# Patient Record
Sex: FEMALE | Race: WHITE | NOT HISPANIC OR LATINO | Employment: OTHER | URBAN - METROPOLITAN AREA
[De-identification: names, ages, dates, MRNs, and addresses within clinical notes are randomized per-mention and may not be internally consistent; named-entity substitution may affect disease eponyms.]

---

## 2023-08-28 ENCOUNTER — APPOINTMENT (OUTPATIENT)
Dept: RADIOLOGY | Facility: MEDICAL CENTER | Age: 78
DRG: 481 | End: 2023-08-28
Attending: EMERGENCY MEDICINE
Payer: MEDICARE

## 2023-08-28 ENCOUNTER — HOSPITAL ENCOUNTER (INPATIENT)
Facility: MEDICAL CENTER | Age: 78
LOS: 1 days | DRG: 481 | End: 2023-08-30
Attending: EMERGENCY MEDICINE | Admitting: STUDENT IN AN ORGANIZED HEALTH CARE EDUCATION/TRAINING PROGRAM
Payer: MEDICARE

## 2023-08-28 DIAGNOSIS — S72.91XA CLOSED FRACTURE OF RIGHT FEMUR, UNSPECIFIED FRACTURE MORPHOLOGY, UNSPECIFIED PORTION OF FEMUR, INITIAL ENCOUNTER (HCC): ICD-10-CM

## 2023-08-28 DIAGNOSIS — S72.001A CLOSED RIGHT HIP FRACTURE, INITIAL ENCOUNTER (HCC): ICD-10-CM

## 2023-08-28 DIAGNOSIS — G47.9 TROUBLE IN SLEEPING: ICD-10-CM
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
## 2023-08-28 PROCEDURE — 70450 CT HEAD/BRAIN W/O DYE: CPT

## 2023-08-28 PROCEDURE — 99285 EMERGENCY DEPT VISIT HI MDM: CPT

## 2023-08-28 PROCEDURE — 72125 CT NECK SPINE W/O DYE: CPT

## 2023-08-28 PROCEDURE — 93005 ELECTROCARDIOGRAM TRACING: CPT | Performed by: EMERGENCY MEDICINE

## 2023-08-29 ENCOUNTER — APPOINTMENT (OUTPATIENT)
Dept: RADIOLOGY | Facility: MEDICAL CENTER | Age: 78
DRG: 481 | End: 2023-08-29
Attending: EMERGENCY MEDICINE
Payer: MEDICARE

## 2023-08-29 ENCOUNTER — APPOINTMENT (OUTPATIENT)
Dept: RADIOLOGY | Facility: MEDICAL CENTER | Age: 78
DRG: 481 | End: 2023-08-29
Attending: STUDENT IN AN ORGANIZED HEALTH CARE EDUCATION/TRAINING PROGRAM
Payer: MEDICARE

## 2023-08-29 ENCOUNTER — ANESTHESIA EVENT (OUTPATIENT)
Dept: SURGERY | Facility: MEDICAL CENTER | Age: 78
DRG: 481 | End: 2023-08-29
Payer: MEDICARE

## 2023-08-29 ENCOUNTER — ANESTHESIA (OUTPATIENT)
Dept: SURGERY | Facility: MEDICAL CENTER | Age: 78
DRG: 481 | End: 2023-08-29
Payer: MEDICARE

## 2023-08-29 PROBLEM — F32.A DEPRESSION: Status: ACTIVE | Noted: 2023-08-29

## 2023-08-29 PROBLEM — S72.001A CLOSED RIGHT HIP FRACTURE, INITIAL ENCOUNTER (HCC): Status: ACTIVE | Noted: 2023-08-29

## 2023-08-29 PROBLEM — D72.829 LEUKOCYTOSIS: Status: ACTIVE | Noted: 2023-08-29

## 2023-08-29 PROBLEM — E87.6 HYPOKALEMIA: Status: ACTIVE | Noted: 2023-08-29

## 2023-08-29 LAB
ALBUMIN SERPL BCP-MCNC: 3.4 G/DL (ref 3.2–4.9)
ALBUMIN/GLOB SERPL: 1.4 G/DL
ALP SERPL-CCNC: 98 U/L (ref 30–99)
ALT SERPL-CCNC: 12 U/L (ref 2–50)
ANION GAP SERPL CALC-SCNC: 12 MMOL/L (ref 7–16)
AST SERPL-CCNC: 16 U/L (ref 12–45)
BASOPHILS # BLD AUTO: 0.4 % (ref 0–1.8)
BASOPHILS # BLD: 0.05 K/UL (ref 0–0.12)
BILIRUB SERPL-MCNC: 0.3 MG/DL (ref 0.1–1.5)
BUN SERPL-MCNC: 17 MG/DL (ref 8–22)
CALCIUM ALBUM COR SERPL-MCNC: 8.2 MG/DL (ref 8.5–10.5)
CALCIUM SERPL-MCNC: 7.7 MG/DL (ref 8.5–10.5)
CHLORIDE SERPL-SCNC: 108 MMOL/L (ref 96–112)
CO2 SERPL-SCNC: 20 MMOL/L (ref 20–33)
CREAT SERPL-MCNC: 0.61 MG/DL (ref 0.5–1.4)
EKG IMPRESSION: NORMAL
EOSINOPHIL # BLD AUTO: 0.02 K/UL (ref 0–0.51)
EOSINOPHIL NFR BLD: 0.2 % (ref 0–6.9)
ERYTHROCYTE [DISTWIDTH] IN BLOOD BY AUTOMATED COUNT: 42.7 FL (ref 35.9–50)
GFR SERPLBLD CREATININE-BSD FMLA CKD-EPI: 90 ML/MIN/1.73 M 2
GLOBULIN SER CALC-MCNC: 2.5 G/DL (ref 1.9–3.5)
GLUCOSE SERPL-MCNC: 172 MG/DL (ref 65–99)
HCT VFR BLD AUTO: 36.7 % (ref 37–47)
HGB BLD-MCNC: 12.1 G/DL (ref 12–16)
IMM GRANULOCYTES # BLD AUTO: 0.11 K/UL (ref 0–0.11)
IMM GRANULOCYTES NFR BLD AUTO: 1 % (ref 0–0.9)
LYMPHOCYTES # BLD AUTO: 0.81 K/UL (ref 1–4.8)
LYMPHOCYTES NFR BLD: 7.1 % (ref 22–41)
MAGNESIUM SERPL-MCNC: 1.7 MG/DL (ref 1.5–2.5)
MCH RBC QN AUTO: 29.5 PG (ref 27–33)
MCHC RBC AUTO-ENTMCNC: 33 G/DL (ref 32.2–35.5)
MCV RBC AUTO: 89.5 FL (ref 81.4–97.8)
MONOCYTES # BLD AUTO: 0.53 K/UL (ref 0–0.85)
MONOCYTES NFR BLD AUTO: 4.6 % (ref 0–13.4)
NEUTROPHILS # BLD AUTO: 9.9 K/UL (ref 1.82–7.42)
NEUTROPHILS NFR BLD: 86.7 % (ref 44–72)
NRBC # BLD AUTO: 0 K/UL
NRBC BLD-RTO: 0 /100 WBC (ref 0–0.2)
PLATELET # BLD AUTO: 239 K/UL (ref 164–446)
PMV BLD AUTO: 10 FL (ref 9–12.9)
POTASSIUM SERPL-SCNC: 3.2 MMOL/L (ref 3.6–5.5)
PROT SERPL-MCNC: 5.9 G/DL (ref 6–8.2)
RBC # BLD AUTO: 4.1 M/UL (ref 4.2–5.4)
SODIUM SERPL-SCNC: 140 MMOL/L (ref 135–145)
WBC # BLD AUTO: 11.4 K/UL (ref 4.8–10.8)

## 2023-08-29 PROCEDURE — 700101 HCHG RX REV CODE 250: Performed by: ANESTHESIOLOGY

## 2023-08-29 PROCEDURE — A9270 NON-COVERED ITEM OR SERVICE: HCPCS | Performed by: STUDENT IN AN ORGANIZED HEALTH CARE EDUCATION/TRAINING PROGRAM

## 2023-08-29 PROCEDURE — 700105 HCHG RX REV CODE 258: Performed by: ANESTHESIOLOGY

## 2023-08-29 PROCEDURE — 27236 TREAT THIGH FRACTURE: CPT | Mod: 80ROC,RT | Performed by: STUDENT IN AN ORGANIZED HEALTH CARE EDUCATION/TRAINING PROGRAM

## 2023-08-29 PROCEDURE — C1713 ANCHOR/SCREW BN/BN,TIS/BN: HCPCS | Performed by: STUDENT IN AN ORGANIZED HEALTH CARE EDUCATION/TRAINING PROGRAM

## 2023-08-29 PROCEDURE — A9270 NON-COVERED ITEM OR SERVICE: HCPCS | Performed by: ANESTHESIOLOGY

## 2023-08-29 PROCEDURE — 160039 HCHG SURGERY MINUTES - EA ADDL 1 MIN LEVEL 3: Performed by: STUDENT IN AN ORGANIZED HEALTH CARE EDUCATION/TRAINING PROGRAM

## 2023-08-29 PROCEDURE — 770001 HCHG ROOM/CARE - MED/SURG/GYN PRIV*

## 2023-08-29 PROCEDURE — 700105 HCHG RX REV CODE 258: Performed by: STUDENT IN AN ORGANIZED HEALTH CARE EDUCATION/TRAINING PROGRAM

## 2023-08-29 PROCEDURE — 700102 HCHG RX REV CODE 250 W/ 637 OVERRIDE(OP): Performed by: NURSE PRACTITIONER

## 2023-08-29 PROCEDURE — 80053 COMPREHEN METABOLIC PANEL: CPT

## 2023-08-29 PROCEDURE — 73552 X-RAY EXAM OF FEMUR 2/>: CPT | Mod: RT

## 2023-08-29 PROCEDURE — A9270 NON-COVERED ITEM OR SERVICE: HCPCS | Performed by: NURSE PRACTITIONER

## 2023-08-29 PROCEDURE — 72170 X-RAY EXAM OF PELVIS: CPT

## 2023-08-29 PROCEDURE — 700102 HCHG RX REV CODE 250 W/ 637 OVERRIDE(OP): Performed by: ANESTHESIOLOGY

## 2023-08-29 PROCEDURE — 110371 HCHG SHELL REV 272: Performed by: STUDENT IN AN ORGANIZED HEALTH CARE EDUCATION/TRAINING PROGRAM

## 2023-08-29 PROCEDURE — 160028 HCHG SURGERY MINUTES - 1ST 30 MINS LEVEL 3: Performed by: STUDENT IN AN ORGANIZED HEALTH CARE EDUCATION/TRAINING PROGRAM

## 2023-08-29 PROCEDURE — 160036 HCHG PACU - EA ADDL 30 MINS PHASE I: Performed by: STUDENT IN AN ORGANIZED HEALTH CARE EDUCATION/TRAINING PROGRAM

## 2023-08-29 PROCEDURE — 700111 HCHG RX REV CODE 636 W/ 250 OVERRIDE (IP): Performed by: ANESTHESIOLOGY

## 2023-08-29 PROCEDURE — 36415 COLL VENOUS BLD VENIPUNCTURE: CPT

## 2023-08-29 PROCEDURE — 700102 HCHG RX REV CODE 250 W/ 637 OVERRIDE(OP): Performed by: STUDENT IN AN ORGANIZED HEALTH CARE EDUCATION/TRAINING PROGRAM

## 2023-08-29 PROCEDURE — 700111 HCHG RX REV CODE 636 W/ 250 OVERRIDE (IP): Mod: JZ | Performed by: ANESTHESIOLOGY

## 2023-08-29 PROCEDURE — 502000 HCHG MISC OR IMPLANTS RC 0278: Performed by: STUDENT IN AN ORGANIZED HEALTH CARE EDUCATION/TRAINING PROGRAM

## 2023-08-29 PROCEDURE — 160035 HCHG PACU - 1ST 60 MINS PHASE I: Performed by: STUDENT IN AN ORGANIZED HEALTH CARE EDUCATION/TRAINING PROGRAM

## 2023-08-29 PROCEDURE — 160009 HCHG ANES TIME/MIN: Performed by: STUDENT IN AN ORGANIZED HEALTH CARE EDUCATION/TRAINING PROGRAM

## 2023-08-29 PROCEDURE — 83735 ASSAY OF MAGNESIUM: CPT

## 2023-08-29 PROCEDURE — 71045 X-RAY EXAM CHEST 1 VIEW: CPT

## 2023-08-29 PROCEDURE — 700111 HCHG RX REV CODE 636 W/ 250 OVERRIDE (IP): Performed by: STUDENT IN AN ORGANIZED HEALTH CARE EDUCATION/TRAINING PROGRAM

## 2023-08-29 PROCEDURE — 700111 HCHG RX REV CODE 636 W/ 250 OVERRIDE (IP): Mod: JZ

## 2023-08-29 PROCEDURE — 160002 HCHG RECOVERY MINUTES (STAT): Performed by: STUDENT IN AN ORGANIZED HEALTH CARE EDUCATION/TRAINING PROGRAM

## 2023-08-29 PROCEDURE — 85025 COMPLETE CBC W/AUTO DIFF WBC: CPT

## 2023-08-29 PROCEDURE — 160048 HCHG OR STATISTICAL LEVEL 1-5: Performed by: STUDENT IN AN ORGANIZED HEALTH CARE EDUCATION/TRAINING PROGRAM

## 2023-08-29 PROCEDURE — 99223 1ST HOSP IP/OBS HIGH 75: CPT | Mod: AI | Performed by: STUDENT IN AN ORGANIZED HEALTH CARE EDUCATION/TRAINING PROGRAM

## 2023-08-29 PROCEDURE — 0QS804Z REPOSITION RIGHT FEMORAL SHAFT WITH INTERNAL FIXATION DEVICE, OPEN APPROACH: ICD-10-PCS | Performed by: STUDENT IN AN ORGANIZED HEALTH CARE EDUCATION/TRAINING PROGRAM

## 2023-08-29 PROCEDURE — 27236 TREAT THIGH FRACTURE: CPT | Mod: RT | Performed by: STUDENT IN AN ORGANIZED HEALTH CARE EDUCATION/TRAINING PROGRAM

## 2023-08-29 PROCEDURE — 99222 1ST HOSP IP/OBS MODERATE 55: CPT | Mod: 57 | Performed by: STUDENT IN AN ORGANIZED HEALTH CARE EDUCATION/TRAINING PROGRAM

## 2023-08-29 DEVICE — WIRE FIXATION KIRSCHNER TROCAR POINT: Type: IMPLANTABLE DEVICE | Site: FEMUR | Status: FUNCTIONAL

## 2023-08-29 DEVICE — IMPLANTABLE DEVICE: Type: IMPLANTABLE DEVICE | Site: FEMUR | Status: FUNCTIONAL

## 2023-08-29 DEVICE — HIP DALL MILES SET 2.0 V BEAD: Type: IMPLANTABLE DEVICE | Site: FEMUR | Status: FUNCTIONAL

## 2023-08-29 RX ORDER — HYDROMORPHONE HYDROCHLORIDE 2 MG/ML
INJECTION, SOLUTION INTRAMUSCULAR; INTRAVENOUS; SUBCUTANEOUS PRN
Status: DISCONTINUED | OUTPATIENT
Start: 2023-08-29 | End: 2023-08-29 | Stop reason: SURG

## 2023-08-29 RX ORDER — GINSENG 100 MG
50 CAPSULE ORAL DAILY
COMMUNITY

## 2023-08-29 RX ORDER — SODIUM CHLORIDE, SODIUM LACTATE, POTASSIUM CHLORIDE, CALCIUM CHLORIDE 600; 310; 30; 20 MG/100ML; MG/100ML; MG/100ML; MG/100ML
INJECTION, SOLUTION INTRAVENOUS
Status: DISCONTINUED | OUTPATIENT
Start: 2023-08-29 | End: 2023-08-29 | Stop reason: SURG

## 2023-08-29 RX ORDER — ONDANSETRON 2 MG/ML
4 INJECTION INTRAMUSCULAR; INTRAVENOUS
Status: COMPLETED | OUTPATIENT
Start: 2023-08-29 | End: 2023-08-29

## 2023-08-29 RX ORDER — OXYCODONE HYDROCHLORIDE 5 MG/1
5 TABLET ORAL
Status: DISCONTINUED | OUTPATIENT
Start: 2023-08-29 | End: 2023-08-30 | Stop reason: HOSPADM

## 2023-08-29 RX ORDER — HYDROMORPHONE HYDROCHLORIDE 1 MG/ML
0.4 INJECTION, SOLUTION INTRAMUSCULAR; INTRAVENOUS; SUBCUTANEOUS
Status: DISCONTINUED | OUTPATIENT
Start: 2023-08-29 | End: 2023-08-29 | Stop reason: HOSPADM

## 2023-08-29 RX ORDER — ACETAMINOPHEN 500 MG
1000 TABLET ORAL EVERY 6 HOURS
Status: DISCONTINUED | OUTPATIENT
Start: 2023-08-29 | End: 2023-08-29

## 2023-08-29 RX ORDER — POTASSIUM CHLORIDE 1500 MG/1
40 TABLET, EXTENDED RELEASE ORAL EVERY 4 HOURS
Status: COMPLETED | OUTPATIENT
Start: 2023-08-29 | End: 2023-08-29

## 2023-08-29 RX ORDER — OXYCODONE HCL 5 MG/5 ML
10 SOLUTION, ORAL ORAL
Status: COMPLETED | OUTPATIENT
Start: 2023-08-29 | End: 2023-08-29

## 2023-08-29 RX ORDER — TOBRAMYCIN 1.2 G/30ML
INJECTION, POWDER, LYOPHILIZED, FOR SOLUTION INTRAVENOUS
Status: DISCONTINUED | OUTPATIENT
Start: 2023-08-29 | End: 2023-08-29 | Stop reason: HOSPADM

## 2023-08-29 RX ORDER — DEXTROAMPHETAMINE SACCHARATE, AMPHETAMINE ASPARTATE MONOHYDRATE, DEXTROAMPHETAMINE SULFATE AND AMPHETAMINE SULFATE 7.5; 7.5; 7.5; 7.5 MG/1; MG/1; MG/1; MG/1
30 CAPSULE, EXTENDED RELEASE ORAL EVERY MORNING
Status: ON HOLD | COMMUNITY
End: 2023-09-11 | Stop reason: SDUPTHER

## 2023-08-29 RX ORDER — LABETALOL HYDROCHLORIDE 5 MG/ML
5 INJECTION, SOLUTION INTRAVENOUS
Status: DISCONTINUED | OUTPATIENT
Start: 2023-08-29 | End: 2023-08-29 | Stop reason: HOSPADM

## 2023-08-29 RX ORDER — ACETAMINOPHEN 500 MG
1000 TABLET ORAL EVERY 6 HOURS PRN
Status: DISCONTINUED | OUTPATIENT
Start: 2023-09-03 | End: 2023-08-29

## 2023-08-29 RX ORDER — MORPHINE SULFATE 4 MG/ML
2 INJECTION INTRAVENOUS
Status: DISCONTINUED | OUTPATIENT
Start: 2023-08-29 | End: 2023-08-30 | Stop reason: HOSPADM

## 2023-08-29 RX ORDER — POLYETHYLENE GLYCOL 3350 17 G/17G
1 POWDER, FOR SOLUTION ORAL
Status: DISCONTINUED | OUTPATIENT
Start: 2023-08-29 | End: 2023-08-30

## 2023-08-29 RX ORDER — DIPHENHYDRAMINE HCL 25 MG
25 TABLET ORAL
Status: ON HOLD | COMMUNITY
End: 2023-08-30

## 2023-08-29 RX ORDER — OXYCODONE HYDROCHLORIDE 5 MG/1
2.5 TABLET ORAL
Status: DISCONTINUED | OUTPATIENT
Start: 2023-08-29 | End: 2023-08-30 | Stop reason: HOSPADM

## 2023-08-29 RX ORDER — VANCOMYCIN HYDROCHLORIDE 1 G/20ML
INJECTION, POWDER, LYOPHILIZED, FOR SOLUTION INTRAVENOUS
Status: COMPLETED | OUTPATIENT
Start: 2023-08-29 | End: 2023-08-29

## 2023-08-29 RX ORDER — ONDANSETRON 2 MG/ML
INJECTION INTRAMUSCULAR; INTRAVENOUS PRN
Status: DISCONTINUED | OUTPATIENT
Start: 2023-08-29 | End: 2023-08-29 | Stop reason: SURG

## 2023-08-29 RX ORDER — HALOPERIDOL 5 MG/ML
1 INJECTION INTRAMUSCULAR
Status: DISCONTINUED | OUTPATIENT
Start: 2023-08-29 | End: 2023-08-29 | Stop reason: HOSPADM

## 2023-08-29 RX ORDER — DEXAMETHASONE SODIUM PHOSPHATE 4 MG/ML
INJECTION, SOLUTION INTRA-ARTICULAR; INTRALESIONAL; INTRAMUSCULAR; INTRAVENOUS; SOFT TISSUE PRN
Status: DISCONTINUED | OUTPATIENT
Start: 2023-08-29 | End: 2023-08-29 | Stop reason: SURG

## 2023-08-29 RX ORDER — HYDROMORPHONE HYDROCHLORIDE 1 MG/ML
0.1 INJECTION, SOLUTION INTRAMUSCULAR; INTRAVENOUS; SUBCUTANEOUS
Status: DISCONTINUED | OUTPATIENT
Start: 2023-08-29 | End: 2023-08-29 | Stop reason: HOSPADM

## 2023-08-29 RX ORDER — MEPERIDINE HYDROCHLORIDE 25 MG/ML
12.5 INJECTION INTRAMUSCULAR; INTRAVENOUS; SUBCUTANEOUS
Status: DISCONTINUED | OUTPATIENT
Start: 2023-08-29 | End: 2023-08-29 | Stop reason: HOSPADM

## 2023-08-29 RX ORDER — ENOXAPARIN SODIUM 100 MG/ML
30 INJECTION SUBCUTANEOUS EVERY 12 HOURS
Status: DISCONTINUED | OUTPATIENT
Start: 2023-08-30 | End: 2023-08-30 | Stop reason: HOSPADM

## 2023-08-29 RX ORDER — BISACODYL 10 MG
10 SUPPOSITORY, RECTAL RECTAL
Status: DISCONTINUED | OUTPATIENT
Start: 2023-08-29 | End: 2023-08-30

## 2023-08-29 RX ORDER — HYDRALAZINE HYDROCHLORIDE 20 MG/ML
5 INJECTION INTRAMUSCULAR; INTRAVENOUS
Status: DISCONTINUED | OUTPATIENT
Start: 2023-08-29 | End: 2023-08-29 | Stop reason: HOSPADM

## 2023-08-29 RX ORDER — CEFAZOLIN SODIUM 1 G/3ML
INJECTION, POWDER, FOR SOLUTION INTRAMUSCULAR; INTRAVENOUS PRN
Status: DISCONTINUED | OUTPATIENT
Start: 2023-08-29 | End: 2023-08-29 | Stop reason: SURG

## 2023-08-29 RX ORDER — ONDANSETRON 2 MG/ML
INJECTION INTRAMUSCULAR; INTRAVENOUS
Status: COMPLETED
Start: 2023-08-29 | End: 2023-08-29

## 2023-08-29 RX ORDER — HYDROMORPHONE HYDROCHLORIDE 1 MG/ML
0.2 INJECTION, SOLUTION INTRAMUSCULAR; INTRAVENOUS; SUBCUTANEOUS
Status: DISCONTINUED | OUTPATIENT
Start: 2023-08-29 | End: 2023-08-29 | Stop reason: HOSPADM

## 2023-08-29 RX ORDER — TRANEXAMIC ACID 100 MG/ML
INJECTION, SOLUTION INTRAVENOUS PRN
Status: DISCONTINUED | OUTPATIENT
Start: 2023-08-29 | End: 2023-08-29 | Stop reason: SURG

## 2023-08-29 RX ORDER — ENOXAPARIN SODIUM 100 MG/ML
40 INJECTION SUBCUTANEOUS DAILY
Status: DISCONTINUED | OUTPATIENT
Start: 2023-08-29 | End: 2023-08-29

## 2023-08-29 RX ORDER — ACETAMINOPHEN 500 MG
1000 TABLET ORAL EVERY 6 HOURS PRN
Status: DISCONTINUED | OUTPATIENT
Start: 2023-09-04 | End: 2023-08-30 | Stop reason: HOSPADM

## 2023-08-29 RX ORDER — SODIUM CHLORIDE, SODIUM LACTATE, POTASSIUM CHLORIDE, CALCIUM CHLORIDE 600; 310; 30; 20 MG/100ML; MG/100ML; MG/100ML; MG/100ML
INJECTION, SOLUTION INTRAVENOUS CONTINUOUS
Status: DISCONTINUED | OUTPATIENT
Start: 2023-08-29 | End: 2023-08-30

## 2023-08-29 RX ORDER — SODIUM CHLORIDE, SODIUM LACTATE, POTASSIUM CHLORIDE, CALCIUM CHLORIDE 600; 310; 30; 20 MG/100ML; MG/100ML; MG/100ML; MG/100ML
INJECTION, SOLUTION INTRAVENOUS CONTINUOUS
Status: DISCONTINUED | OUTPATIENT
Start: 2023-08-29 | End: 2023-08-29 | Stop reason: HOSPADM

## 2023-08-29 RX ORDER — ACETAMINOPHEN 500 MG
1000 TABLET ORAL EVERY 6 HOURS
Status: DISCONTINUED | OUTPATIENT
Start: 2023-08-30 | End: 2023-08-30 | Stop reason: HOSPADM

## 2023-08-29 RX ORDER — OXYCODONE HCL 5 MG/5 ML
5 SOLUTION, ORAL ORAL
Status: COMPLETED | OUTPATIENT
Start: 2023-08-29 | End: 2023-08-29

## 2023-08-29 RX ORDER — IBUPROFEN 800 MG/1
800 TABLET, FILM COATED ORAL 3 TIMES DAILY
Status: DISCONTINUED | OUTPATIENT
Start: 2023-08-29 | End: 2023-08-30 | Stop reason: HOSPADM

## 2023-08-29 RX ORDER — AMOXICILLIN 250 MG
2 CAPSULE ORAL 2 TIMES DAILY
Status: DISCONTINUED | OUTPATIENT
Start: 2023-08-29 | End: 2023-08-30

## 2023-08-29 RX ORDER — IBUPROFEN 800 MG/1
800 TABLET, FILM COATED ORAL 3 TIMES DAILY PRN
Status: DISCONTINUED | OUTPATIENT
Start: 2023-09-03 | End: 2023-08-30 | Stop reason: HOSPADM

## 2023-08-29 RX ORDER — IBUPROFEN 200 MG
400 TABLET ORAL EVERY 6 HOURS PRN
Status: ON HOLD | COMMUNITY
End: 2023-08-30

## 2023-08-29 RX ADMIN — HYDROMORPHONE HYDROCHLORIDE 1 MG: 2 INJECTION INTRAMUSCULAR; INTRAVENOUS; SUBCUTANEOUS at 18:56

## 2023-08-29 RX ADMIN — PROPOFOL 150 MG: 10 INJECTION, EMULSION INTRAVENOUS at 17:08

## 2023-08-29 RX ADMIN — ONDANSETRON 4 MG: 2 INJECTION INTRAMUSCULAR; INTRAVENOUS at 19:50

## 2023-08-29 RX ADMIN — POTASSIUM CHLORIDE 40 MEQ: 1500 TABLET, EXTENDED RELEASE ORAL at 09:58

## 2023-08-29 RX ADMIN — HYDROMORPHONE HYDROCHLORIDE 0.5 MG: 2 INJECTION INTRAMUSCULAR; INTRAVENOUS; SUBCUTANEOUS at 18:37

## 2023-08-29 RX ADMIN — Medication 5 MG: at 22:11

## 2023-08-29 RX ADMIN — DEXAMETHASONE SODIUM PHOSPHATE 4 MG: 4 INJECTION INTRA-ARTICULAR; INTRALESIONAL; INTRAMUSCULAR; INTRAVENOUS; SOFT TISSUE at 17:08

## 2023-08-29 RX ADMIN — TRANEXAMIC ACID 1000 MG: 100 INJECTION, SOLUTION INTRAVENOUS at 17:08

## 2023-08-29 RX ADMIN — HALOPERIDOL LACTATE 1 MG: 5 INJECTION, SOLUTION INTRAMUSCULAR at 20:00

## 2023-08-29 RX ADMIN — IBUPROFEN 800 MG: 800 TABLET, FILM COATED ORAL at 22:10

## 2023-08-29 RX ADMIN — SUGAMMADEX 200 MG: 100 INJECTION, SOLUTION INTRAVENOUS at 18:44

## 2023-08-29 RX ADMIN — CEFAZOLIN 2 G: 1 INJECTION, POWDER, FOR SOLUTION INTRAMUSCULAR; INTRAVENOUS at 17:08

## 2023-08-29 RX ADMIN — POTASSIUM CHLORIDE 40 MEQ: 1500 TABLET, EXTENDED RELEASE ORAL at 05:29

## 2023-08-29 RX ADMIN — FENTANYL CITRATE 25 MCG: 50 INJECTION, SOLUTION INTRAMUSCULAR; INTRAVENOUS at 20:10

## 2023-08-29 RX ADMIN — HYDROMORPHONE HYDROCHLORIDE 0.5 MG: 2 INJECTION INTRAMUSCULAR; INTRAVENOUS; SUBCUTANEOUS at 18:21

## 2023-08-29 RX ADMIN — Medication 5 MG: at 02:58

## 2023-08-29 RX ADMIN — FENTANYL CITRATE 25 MCG: 50 INJECTION, SOLUTION INTRAMUSCULAR; INTRAVENOUS at 20:06

## 2023-08-29 RX ADMIN — OXYCODONE HYDROCHLORIDE 5 MG: 5 SOLUTION ORAL at 20:06

## 2023-08-29 RX ADMIN — ROCURONIUM BROMIDE 30 MG: 10 INJECTION, SOLUTION INTRAVENOUS at 17:08

## 2023-08-29 RX ADMIN — TRANEXAMIC ACID 1000 MG: 100 INJECTION, SOLUTION INTRAVENOUS at 18:55

## 2023-08-29 RX ADMIN — SODIUM CHLORIDE, POTASSIUM CHLORIDE, SODIUM LACTATE AND CALCIUM CHLORIDE: 600; 310; 30; 20 INJECTION, SOLUTION INTRAVENOUS at 22:10

## 2023-08-29 RX ADMIN — ONDANSETRON 4 MG: 2 INJECTION INTRAMUSCULAR; INTRAVENOUS at 18:44

## 2023-08-29 RX ADMIN — SODIUM CHLORIDE, POTASSIUM CHLORIDE, SODIUM LACTATE AND CALCIUM CHLORIDE: 600; 310; 30; 20 INJECTION, SOLUTION INTRAVENOUS at 03:00

## 2023-08-29 RX ADMIN — SODIUM CHLORIDE, POTASSIUM CHLORIDE, SODIUM LACTATE AND CALCIUM CHLORIDE: 600; 310; 30; 20 INJECTION, SOLUTION INTRAVENOUS at 16:50

## 2023-08-29 ASSESSMENT — PAIN DESCRIPTION - PAIN TYPE
TYPE: ACUTE PAIN
TYPE: SURGICAL PAIN
TYPE: ACUTE PAIN
TYPE: SURGICAL PAIN

## 2023-08-29 ASSESSMENT — ENCOUNTER SYMPTOMS
PALPITATIONS: 0
MYALGIAS: 1
RESPIRATORY NEGATIVE: 1
DOUBLE VISION: 0
EYES NEGATIVE: 1
COUGH: 0
CHILLS: 0
FALLS: 1
BLURRED VISION: 0
HEARTBURN: 0
DIZZINESS: 0
WEAKNESS: 1
HEADACHES: 0
CARDIOVASCULAR NEGATIVE: 1
SORE THROAT: 0
FEVER: 0
SHORTNESS OF BREATH: 0
PSYCHIATRIC NEGATIVE: 1
ABDOMINAL PAIN: 0
NAUSEA: 0
GASTROINTESTINAL NEGATIVE: 1

## 2023-08-29 NOTE — PROGRESS NOTES
Hospital Medicine Daily Progress Note    Date of Service  8/29/2023    Chief Complaint  Barbra العراقي is a 78 y.o. female admitted 8/28/2023 with LEFT leg pain     Hospital Course  Ms. Barbra العراقي is a pleasant 78-year-old woman with little known medical history other than depression for which she is prescribed Adderall (?)who presented 8/28/2023 with ground-level fall.      She presents after a ground-level fall at home.  She was walking out of her bathroom and tripped and fell onto her right hip and felt immediate pain.  She has a history of bilateral hip surgeries with hardware in place.  X-ray showed proximal femoral diaphyseal periprosthetic fracture.  Ortho was consulted, Dr. Galvan, plan for OR morning.    Patient noted to have normal vital signs on admission.  Notable lab findings include leukocytosis at 11.4, RBC 4.10, hematocrit 36.7, potassium 3.2, glucose 172, calcium 7.7, corrected calcium 8.2.  Hospital medicine consulted for management of proximal femoral diaphyseal periprosthetic fracture.    Patient anticipated to go to OR with orthopedics today Dr. Galvan.    Interval Problem Update  -Patient seen and examined.  Patient is reporting bilateral left hip pain.  Discussed with patient imaging results which noted a proximal diaphyseal periprosthetic fracture.  Also discussed with patient evaluation with physical therapy postprocedure for postacute needs.  -Plan of care: Patient currently n.p.o. status for anticipated orthopedics surgical intervention; can resume diet once surgery is done; will need PT/OT evaluation post intervention  -Disposition: Patient currently inpatient status as she is anticipated to stay 2-3 midnights for management of femoral diaphyseal fracture  -Lab work: Reviewed; expected  -VSS at this time    I have discussed this patient's plan of care and discharge plan at IDT rounds today with Case Management, Nursing, Nursing leadership, and other members of the IDT  team.    Consultants/Specialty  orthopedics -- Dr. Galvan    Code Status  Full Code    Disposition  The patient is not medically cleared for discharge to home or a post-acute facility.  Anticipate discharge to: home with close outpatient follow-up    I have placed the appropriate orders for post-discharge needs.    Review of Systems  Review of Systems   Constitutional:  Positive for malaise/fatigue. Negative for chills and fever.   HENT: Negative.     Eyes: Negative.    Respiratory: Negative.     Cardiovascular: Negative.    Gastrointestinal: Negative.    Genitourinary: Negative.    Musculoskeletal:  Positive for falls, joint pain and myalgias.   Skin: Negative.    Neurological:  Positive for weakness.   Endo/Heme/Allergies: Negative.    Psychiatric/Behavioral: Negative.          Physical Exam  Temp:  [36.9 °C (98.4 °F)] 36.9 °C (98.4 °F)  Pulse:  [63-89] 64  Resp:  [16-21] 16  BP: (109-168)/(60-69) 152/68  SpO2:  [95 %-98 %] 97 %    Physical Exam  Vitals reviewed.   HENT:      Head: Normocephalic.      Nose: Nose normal.      Mouth/Throat:      Mouth: Mucous membranes are moist.      Pharynx: Oropharynx is clear.   Eyes:      Pupils: Pupils are equal, round, and reactive to light.   Cardiovascular:      Rate and Rhythm: Normal rate and regular rhythm.      Pulses: Normal pulses.      Heart sounds: Normal heart sounds.   Pulmonary:      Effort: Pulmonary effort is normal.      Breath sounds: Normal breath sounds.   Abdominal:      General: Bowel sounds are normal.      Palpations: Abdomen is soft.   Musculoskeletal:         General: Tenderness present.      Cervical back: Normal range of motion and neck supple.   Skin:     General: Skin is dry.      Capillary Refill: Capillary refill takes 2 to 3 seconds.   Neurological:      Mental Status: She is alert. Mental status is at baseline.      Motor: Weakness present.         Fluids  No intake or output data in the 24 hours ending 08/29/23 0954    Laboratory  Recent  Labs     08/29/23  0001   WBC 11.4*   RBC 4.10*   HEMOGLOBIN 12.1   HEMATOCRIT 36.7*   MCV 89.5   MCH 29.5   MCHC 33.0   RDW 42.7   PLATELETCT 239   MPV 10.0     Recent Labs     08/29/23  0001   SODIUM 140   POTASSIUM 3.2*   CHLORIDE 108   CO2 20   GLUCOSE 172*   BUN 17   CREATININE 0.61   CALCIUM 7.7*                   Imaging  DX-FEMUR-2+ RIGHT   Final Result         1.  Proximal femoral diaphyseal periprosthetic fracture.      DX-PELVIS-1 OR 2 VIEWS   Final Result         1.  No acute traumatic bony injury.      DX-CHEST-PORTABLE (1 VIEW)   Final Result         1.  No acute cardiopulmonary disease.   2.  Atherosclerosis   3.  Perihilar interstitial prominence and bronchial wall cuffing, appearance suggests changes of underlying bronchial inflammation, consider bronchitis.      CT-CSPINE WITHOUT PLUS RECONS   Final Result         1.  Multilevel degenerative changes of the cervical spine limit diagnostic sensitivity of this examination, otherwise no acute traumatic bony injury of the cervical spine is apparent.   2.  Atherosclerosis      CT-HEAD W/O   Final Result         1.  No acute intracranial abnormality is identified, there are nonspecific white matter changes, commonly associated with small vessel ischemic disease.  Associated mild cerebral atrophy is noted.   2.  Atherosclerosis.         DX-PORTABLE FLUORO > 1 HOUR    (Results Pending)   DX-FEMUR-2+ RIGHT    (Results Pending)        Assessment/Plan  * Closed right hip fracture, initial encounter ([facility])- (present on admission)  Assessment & Plan  Ortho consulted, Dr. Galvan, plan for OR in morning  N.p.o.  Pain control  PT when appropriate      Leukocytosis  Assessment & Plan  WBC 11.4  Likely reactive  No localizing signs of infection    Depression  Assessment & Plan  Apparently on Adderall for this problem, unusual choice  Holding off on giving any more Adderall here in house    Hypokalemia  Assessment & Plan  3.2 on presentation  Replacement ordered  We  will check mag         VTE prophylaxis:    enoxaparin ppx      I have performed a physical exam and reviewed and updated ROS and Plan today (8/29/2023). In review of yesterday's note (8/28/2023), there are no changes except as documented above.    ===============================================================  Please note that this dictation was created using voice recognition software. I have made every reasonable attempt to correct obvious errors, but there may be errors of grammar and possibly content that I did not discover before finalizing the note.    Electronically signed by:  Dr. TAMEKA Dubose, DNP, APRN, FNP-C  Hospitalist Services  [facility]  [phone]  [email or web address]  08/29/23                  [phone]

## 2023-08-29 NOTE — ED NOTES
Pt resting comfortably in bed. Bed locked and in lowest position. Call light within reach. No further needs at this time.

## 2023-08-29 NOTE — ED PROVIDER NOTES
ED Provider Note    CHIEF COMPLAINT  Chief Complaint   Patient presents with    T-5000 GLF       EXTERNAL RECORDS REVIEWED  Other none available    HPI/ROS  LIMITATION TO HISTORY   Select: Prehospital sedation for pain management  OUTSIDE HISTORIAN(S):  EMS report directly to myself at triage desk for code TBI    Barbra العراقي is a 78 y.o. female who presents to the ER after mechanical fall in her bathroom.  Now with primary complaint of right leg and hip pain.  Does however note that she hit her head in the bathroom when she fell.  Denies loss of conscious.  Does take aspirin.  No neck or back pain.  No chest or abdominal pain.  Has been feeling well of recent.  Does have history of prior orthopedic hip repair.    PAST MEDICAL HISTORY       SURGICAL HISTORY  patient denies any surgical history    FAMILY HISTORY  No family history on file.    SOCIAL HISTORY  Social History     Tobacco Use    Smoking status: Not on file    Smokeless tobacco: Not on file   Substance and Sexual Activity    Alcohol use: Not on file    Drug use: Not on file    Sexual activity: Not on file       CURRENT MEDICATIONS  Home Medications       Reviewed by Muriel Anand (Pharmacy Tech) on 08/29/23 at 0200  Med List Status: Complete     Medication Last Dose Status   amphetamine-dextroamphetamine ER (ADDERALL XR, 30MG,) 30 MG XR capsule 8/28/2023 Active   Calcium Carbonate (CALCIUM 600 PO) 8/28/2023 Active   Cholecalciferol (D3 PO) 8/28/2023 Active   Cyanocobalamin (B-12 PO) 8/28/2023 Active   diphenhydrAMINE (BENADRYL) 25 MG Tab 8/28/2023 Active   ibuprofen (MOTRIN) 200 MG Tab 8/28/2023 Active   Multiple Vitamins-Minerals (ZINC PO) 8/28/2023 Active   Zinc 50 MG Tab 8/28/2023 Active                    ALLERGIES  Allergies   Allergen Reactions    Ampicillin Unspecified     Hazy, bloated    Keflex [Cephalexin] Unspecified     Drowsy, altered       PHYSICAL EXAM  VITAL SIGNS: BP (!) 154/67   Pulse 87   Temp 36.9 °C (98.4 °F) (Temporal)   Resp  "16   Ht 1.651 m (5' 5\")   Wt 59 kg (130 lb)   SpO2 98%   BMI 21.63 kg/m²      Pulse ox interpretation: I interpret this pulse ox as normal.  Constitutional: Alert in no apparent distress.  HENT: No signs of trauma, Bilateral external ears normal, Nose normal.   Eyes: Pupils are equal and reactive  Neck: Normal range of motion, No tenderness, Supple  Cardiovascular: Regular rate and rhythm, no murmurs.   Thorax & Lungs: Normal breath sounds, No respiratory distress, No wheezing, No chest tenderness.   Abdomen: Bowel sounds normal, Soft, No tenderness  Skin: Warm, Dry, No erythema, No rash.   Extremities: Intact distal pulses  Right lower extremity: On inspection the right lower extremity is slightly shortened with external rotation.  Tender hip and mid thigh.  Distal neurovascular motor intact  Musculoskeletal: Good range of motion in all major joints. No tenderness to palpation or major deformities noted.   Neurologic: Alert , Normal motor function, Normal sensory function, No focal deficits noted.   Psychiatric: Affect normal, Judgment normal, Mood normal.         DIAGNOSTIC STUDIES / PROCEDURES    LABS  Results for orders placed or performed during the hospital encounter of 08/28/23   CBC WITH DIFFERENTIAL   Result Value Ref Range    WBC 11.4 (H) 4.8 - 10.8 K/uL    RBC 4.10 (L) 4.20 - 5.40 M/uL    Hemoglobin 12.1 12.0 - 16.0 g/dL    Hematocrit 36.7 (L) 37.0 - 47.0 %    MCV 89.5 81.4 - 97.8 fL    MCH 29.5 27.0 - 33.0 pg    MCHC 33.0 32.2 - 35.5 g/dL    RDW 42.7 35.9 - 50.0 fL    Platelet Count 239 164 - 446 K/uL    MPV 10.0 9.0 - 12.9 fL    Neutrophils-Polys 86.70 (H) 44.00 - 72.00 %    Lymphocytes 7.10 (L) 22.00 - 41.00 %    Monocytes 4.60 0.00 - 13.40 %    Eosinophils 0.20 0.00 - 6.90 %    Basophils 0.40 0.00 - 1.80 %    Immature Granulocytes 1.00 (H) 0.00 - 0.90 %    Nucleated RBC 0.00 0.00 - 0.20 /100 WBC    Neutrophils (Absolute) 9.90 (H) 1.82 - 7.42 K/uL    Lymphs (Absolute) 0.81 (L) 1.00 - 4.80 K/uL    " Monos (Absolute) 0.53 0.00 - 0.85 K/uL    Eos (Absolute) 0.02 0.00 - 0.51 K/uL    Baso (Absolute) 0.05 0.00 - 0.12 K/uL    Immature Granulocytes (abs) 0.11 0.00 - 0.11 K/uL    NRBC (Absolute) 0.00 K/uL   COMP METABOLIC PANEL   Result Value Ref Range    Sodium 140 135 - 145 mmol/L    Potassium 3.2 (L) 3.6 - 5.5 mmol/L    Chloride 108 96 - 112 mmol/L    Co2 20 20 - 33 mmol/L    Anion Gap 12.0 7.0 - 16.0    Glucose 172 (H) 65 - 99 mg/dL    Bun 17 8 - 22 mg/dL    Creatinine 0.61 0.50 - 1.40 mg/dL    Calcium 7.7 (L) 8.5 - 10.5 mg/dL    Correct Calcium 8.2 (L) 8.5 - 10.5 mg/dL    AST(SGOT) 16 12 - 45 U/L    ALT(SGPT) 12 2 - 50 U/L    Alkaline Phosphatase 98 30 - 99 U/L    Total Bilirubin 0.3 0.1 - 1.5 mg/dL    Albumin 3.4 3.2 - 4.9 g/dL    Total Protein 5.9 (L) 6.0 - 8.2 g/dL    Globulin 2.5 1.9 - 3.5 g/dL    A-G Ratio 1.4 g/dL   ESTIMATED GFR   Result Value Ref Range    GFR (CKD-EPI) 90 >60 mL/min/1.73 m 2   MAGNESIUM   Result Value Ref Range    Magnesium 1.7 1.5 - 2.5 mg/dL   EKG (NOW)   Result Value Ref Range    Report       [facility] Emergency Dept.    Test Date:  2023  Pt Name:    LUCRETIA MCCABE                  Department: ER  MRN:        [id number]                      Room:       RD 07  Gender:     Female                       Technician: [id number]  :        [id number]                   Requested By:BILL JAEGER  Order #:    [id number]                    Reading MD: Bill Jaeger    Measurements  Intervals                                Axis  Rate:       68                           P:          45  KY:         194                          QRS:        32  QRSD:       111                          T:          49  QT:         505  QTc:        538    Interpretive Statements  Sinus rhythm  Prolonged QT interval  No previous ECG available for comparison  Electronically Signed On 2023 04:13:58 PDT by Bill Jaeger           RADIOLOGY  I have independently interpreted the diagnostic imaging  associated with this visit and am waiting the final reading from the radiologist.   My preliminary interpretation is as follows: Periprosthetic fracture proximal femur on right  Radiologist interpretation:   DX-FEMUR-2+ RIGHT   Final Result         1.  Proximal femoral diaphyseal periprosthetic fracture.      DX-PELVIS-1 OR 2 VIEWS   Final Result         1.  No acute traumatic bony injury.      DX-CHEST-PORTABLE (1 VIEW)   Final Result         1.  No acute cardiopulmonary disease.   2.  Atherosclerosis   3.  Perihilar interstitial prominence and bronchial wall cuffing, appearance suggests changes of underlying bronchial inflammation, consider bronchitis.      CT-CSPINE WITHOUT PLUS RECONS   Final Result         1.  Multilevel degenerative changes of the cervical spine limit diagnostic sensitivity of this examination, otherwise no acute traumatic bony injury of the cervical spine is apparent.   2.  Atherosclerosis      CT-HEAD W/O   Final Result         1.  No acute intracranial abnormality is identified, there are nonspecific white matter changes, commonly associated with small vessel ischemic disease.  Associated mild cerebral atrophy is noted.   2.  Atherosclerosis.               COURSE & MEDICAL DECISION MAKING    ED Observation Status? No; Patient does not meet criteria for ED Observation.     INITIAL ASSESSMENT, COURSE AND PLAN  Care Narrative: 78-year-old female presenting the emerge primary as code TBI after mechanical fall.  History as above.  Will complete trauma work-up as well as preoperative screening given high clinical suspicion for fracture    Fracture identified on right femur as identified above    DISPOSITION AND DISCUSSIONS  I have discussed management of the patient with the following physicians and EDDA's: Orthopedic surgeon Dr. Galvan and hospitalist    Discussion of management with other \[facility]\"" or appropriate source(s): Pharmacy for medication verification      Patient presenting after mechanical  fall.  History above.  Trauma work-up as above identifying a right femur fracture.  Again orthopedics has been consulted.  Medicine to primary admit.  Likely OR tomorrow.  Patient has been kept NPO.  Has been more comfortable here in the ER with pain medication.  Additional preoperative screening has been completed as above.    FINAL DIAGNOSIS  1. Fall, initial encounter    2. Closed fracture of right femur, unspecified fracture morphology, unspecified portion of femur, initial encounter ([facility])           Electronically signed by: Bill Lee M.D., 8/28/2023 11:41 PM

## 2023-08-29 NOTE — H&P
Hospital Medicine History & Physical Note    Date of Service  8/29/2023    Primary Care Physician  No primary care provider on file.    Consultants  orthopedics    Specialist Names: Dr. Galvan    Code Status  Full Code    Chief Complaint  Chief Complaint   Patient presents with    T-5000 GLF       History of Presenting Illness  Barbra العراقي is a 78 y.o. female who presented 8/28/2023 with ground-level fall.  Is a pleasant 78-year-old woman with little known medical history other than depression for which she is prescribed Adderall (?)  Who presents after a ground-level fall at home.  She was walking out of her bathroom and tripped and fell onto her right hip and felt immediate pain.  She has a history of bilateral hip surgeries with hardware in place.  X-ray showed proximal femoral diaphyseal periprosthetic fracture.  Ortho was consulted, Dr. Galvan, plan for OR morning.    I discussed the plan of care with patient.    Review of Systems  Review of Systems   Constitutional:  Negative for chills and fever.   HENT:  Negative for congestion and sore throat.    Eyes:  Negative for blurred vision and double vision.   Respiratory:  Negative for cough and shortness of breath.    Cardiovascular:  Negative for chest pain, palpitations and leg swelling.   Gastrointestinal:  Negative for abdominal pain, heartburn and nausea.   Genitourinary:  Negative for dysuria and urgency.   Musculoskeletal:  Positive for falls and joint pain.   Skin:  Negative for itching and rash.   Neurological:  Negative for dizziness and headaches.       Past Medical History   has no past medical history on file.    Surgical History   has no past surgical history on file.     Family History  Patient unaware of family history  Family history reviewed with patient. There is no family history that is pertinent to the chief complaint.     Social History       Allergies  Allergies   Allergen Reactions    Ampicillin Unspecified     Hazy, bloated    Keflex  [Cephalexin] Unspecified     Drowsy, altered       Medications  None       Physical Exam  Temp:  [36.9 °C (98.4 °F)] 36.9 °C (98.4 °F)  Pulse:  [65-89] 87  Resp:  [16-21] 16  BP: (109-157)/(63-67) 154/67  SpO2:  [96 %-98 %] 98 %  Blood Pressure : 109/63   Temperature: 36.9 °C (98.4 °F)   Pulse: 89   Respiration: 16   Pulse Oximetry: 96 %       Physical Exam  Constitutional:       General: She is not in acute distress.     Appearance: She is not ill-appearing or toxic-appearing.   HENT:      Head: Normocephalic and atraumatic.      Nose: Nose normal.      Mouth/Throat:      Mouth: Mucous membranes are dry.      Pharynx: Oropharynx is clear.   Eyes:      Extraocular Movements: Extraocular movements intact.      Conjunctiva/sclera: Conjunctivae normal.   Cardiovascular:      Rate and Rhythm: Normal rate and regular rhythm.      Pulses: Normal pulses.      Heart sounds: Normal heart sounds.   Pulmonary:      Effort: Pulmonary effort is normal.      Breath sounds: Normal breath sounds.   Abdominal:      General: Abdomen is flat.      Palpations: Abdomen is soft.   Musculoskeletal:         General: Deformity present. No swelling.      Cervical back: Normal range of motion and neck supple.      Comments: Right hip abducted and externally rotated   Skin:     General: Skin is warm and dry.      Capillary Refill: Capillary refill takes less than 2 seconds.   Neurological:      General: No focal deficit present.      Mental Status: She is oriented to person, place, and time.         Laboratory:  Recent Labs     08/29/23  0001   WBC 11.4*   RBC 4.10*   HEMOGLOBIN 12.1   HEMATOCRIT 36.7*   MCV 89.5   MCH 29.5   MCHC 33.0   RDW 42.7   PLATELETCT 239   MPV 10.0     Recent Labs     08/29/23  0001   SODIUM 140   POTASSIUM 3.2*   CHLORIDE 108   CO2 20   GLUCOSE 172*   BUN 17   CREATININE 0.61   CALCIUM 7.7*     Recent Labs     08/29/23  0001   ALTSGPT 12   ASTSGOT 16   ALKPHOSPHAT 98   TBILIRUBIN 0.3   GLUCOSE 172*         No results  "for input(s): \"NTPROBNP\" in the last 72 hours.      No results for input(s): \"TROPONINT\" in the last 72 hours.    Imaging:  DX-FEMUR-2+ RIGHT   Final Result         1.  Proximal femoral diaphyseal periprosthetic fracture.      DX-PELVIS-1 OR 2 VIEWS   Final Result         1.  No acute traumatic bony injury.      DX-CHEST-PORTABLE (1 VIEW)   Final Result         1.  No acute cardiopulmonary disease.   2.  Atherosclerosis   3.  Perihilar interstitial prominence and bronchial wall cuffing, appearance suggests changes of underlying bronchial inflammation, consider bronchitis.      CT-CSPINE WITHOUT PLUS RECONS   Final Result         1.  Multilevel degenerative changes of the cervical spine limit diagnostic sensitivity of this examination, otherwise no acute traumatic bony injury of the cervical spine is apparent.   2.  Atherosclerosis      CT-HEAD W/O   Final Result         1.  No acute intracranial abnormality is identified, there are nonspecific white matter changes, commonly associated with small vessel ischemic disease.  Associated mild cerebral atrophy is noted.   2.  Atherosclerosis.             EKG:  I have personally reviewed the images and compared with prior images. and My impression is: Sinus rhythm with a rate of 68, QTc 538, no ST changes    Assessment/Plan:  Justification for Admission Status  I anticipate this patient will require at least two midnights for appropriate medical management, necessitating inpatient admission because of fracture requiring surgical repair    Patient will need a Med/Surg bed on EMERGENCY service .  The need is secondary to above.    * Closed right hip fracture, initial encounter (H[facility])- (present on admission)  Assessment & Plan  Ortho consulted, Dr. Galvan, plan for OR in morning  N.p.o.  Pain control  PT when appropriate      Leukocytosis  Assessment & Plan  WBC 11.4  Likely reactive  No localizing signs of infection    Depression  Assessment & Plan  Apparently on Adderall for " this problem, unusual choice  Holding off on giving any more Adderall here in house    Hypokalemia  Assessment & Plan  3.2 on presentation  Replacement ordered  We will check mag        VTE prophylaxis: SCDs/TEDs and enoxaparin ppx

## 2023-08-29 NOTE — ASSESSMENT & PLAN NOTE
Apparently on Adderall for this problem, unusual choice  Holding off on giving any more Adderall here in house

## 2023-08-29 NOTE — ED NOTES
Bedside report received from JOB Bailey.  Bed locked and in lowest position with call light in reach. Fall risk interventions in place.

## 2023-08-29 NOTE — ED NOTES
Med Rec complete per patient  No oral antibiotics in the last 30 days per patient  Allergies reviewed  [facility]   Patient also received Adderall 15 mg BID on 7/27/23, patient did not report taking this.

## 2023-08-29 NOTE — ED TRIAGE NOTES
Chief Complaint   Patient presents with    T-5000 GLF     Pt BIB EMS from home for above. Pt tripped and fell in the bathroom, reports hitting her head. Denies LOC, doesn't take blood thinners. Pt has swelling and shortening to her R leg. History of hip replacement 15 years ago on R hip. Pt received 100 fentanyl and 8 zofran prior to arrival.

## 2023-08-29 NOTE — OR NURSING
Received patient from the unit on bed awake , alert and oriented x 4 .   V/s taken and recorded.  Dr. Galvan at bedside and marked the right hip. Pending consent and note  .

## 2023-08-29 NOTE — ED NOTES
Pt cleaned and linens changed. Pt updated on POC and hospital bed ordered. No further needs at this time.

## 2023-08-29 NOTE — CONSULTS
8/29/2023    Time Called: 0500  Time Arrived: 1300      HPI: Barbra العراقي is a 78 y.o. female who presents with right hip pain after ground-level fall.  She denies any other injury sustained in the fall.  She was seen in the ED where x-rays revealed a right proximal femur periprosthetic fracture.  She reports her hip replacement was done in [place] back in 2006.  She has not had any issues with that until the fall.  She has been unable to ambulate since her injury.  Of note she has self-reported urinary tract infection and dysuria.  She has what appears to be history of left periprosthetic femur fracture as well although she is unaware exactly what they did.  She ambulates with a cane at baseline.    History reviewed. No pertinent past medical history.    History reviewed. No pertinent surgical history.    Medications  No current facility-administered medications on file prior to encounter.     Current Outpatient Medications on File Prior to Encounter   Medication Sig Dispense Refill    amphetamine-dextroamphetamine ER (ADDERALL XR, 30MG,) 30 MG XR capsule Take 30 mg by mouth every morning.      diphenhydrAMINE (BENADRYL) 25 MG Tab Take 25 mg by mouth 1 time a day as needed for Sleep.      Cyanocobalamin (B-12 PO) Take 1 Tablet by mouth every day.      Calcium Carbonate (CALCIUM 600 PO) Take 1 Tablet by mouth every day.      Multiple Vitamins-Minerals (ZINC PO) Take 1 Tablet by mouth every day.      Cholecalciferol (D3 PO) Take 1 Tablet by mouth every day.      ibuprofen (MOTRIN) 200 MG Tab Take 400 mg by mouth every 6 hours as needed.      Zinc 50 MG Tab Take 50 mg by mouth every day.         Allergies  Ampicillin and Keflex [cephalexin]    ROS  . All other systems were reviewed and found to be negative    History reviewed. No pertinent family history.    Social History     Socioeconomic History    Marital status:        Physical Exam  Vitals  /63   Pulse 74   Temp 36.3 °C (97.4 °F) (Temporal)    "Resp 20   Ht 1.651 m (5' 5\")   Wt 59 kg (130 lb)   SpO2 93%   General: Well Developed, Well Nourished, Age appropriate appearance  HEENT: Normocephalic, atraumatic  Psych: Normal mood and affect  Neck: Supple, nontender, no masses  Lungs: Breathing unlabored, No audible wheezing  Heart: Regular heart rate and rhythm  Abdomen: Soft, NT, ND  Neuro: Sensation grossly intact to BUE and BLE, moving all four extremities  Skin: Intact, no open wounds  Vascular: , Capillary refill <2 seconds  MSK: Right lower extremity: No obvious shortening or rotational deformity noted.  Sensation intact distally to deep peroneal superficial peroneal tibial nerves.  Ankle dorsiflexion plantarflexion intact.      Radiographs:  DX-FEMUR-2+ RIGHT   Final Result         1.  Proximal femoral diaphyseal periprosthetic fracture.      DX-PELVIS-1 OR 2 VIEWS   Final Result         1.  No acute traumatic bony injury.      DX-CHEST-PORTABLE (1 VIEW)   Final Result         1.  No acute cardiopulmonary disease.   2.  Atherosclerosis   3.  Perihilar interstitial prominence and bronchial wall cuffing, appearance suggests changes of underlying bronchial inflammation, consider bronchitis.      CT-CSPINE WITHOUT PLUS RECONS   Final Result         1.  Multilevel degenerative changes of the cervical spine limit diagnostic sensitivity of this examination, otherwise no acute traumatic bony injury of the cervical spine is apparent.   2.  Atherosclerosis      CT-HEAD W/O   Final Result         1.  No acute intracranial abnormality is identified, there are nonspecific white matter changes, commonly associated with small vessel ischemic disease.  Associated mild cerebral atrophy is noted.   2.  Atherosclerosis.         DX-PORTABLE FLUORO > 1 HOUR    (Results Pending)   DX-FEMUR-2+ RIGHT    (Results Pending)       Laboratory Values  Recent Labs     08/29/23  0001   WBC 11.4*   RBC 4.10*   HEMOGLOBIN 12.1   HEMATOCRIT 36.7*   MCV 89.5   MCH 29.5   MCHC 33.0   RDW " 42.7   PLATELETCT 239   MPV 10.0     Recent Labs     08/29/23  0001   SODIUM 140   POTASSIUM 3.2*   CHLORIDE 108   CO2 20   GLUCOSE 172*   BUN 17             Impression: 78-year-old female ground-level fall with right periprosthetic proximal femur fracture around what appears to be a well-seated stable press-fit femoral stem.  We discussed options for management including surgical fixation versus revision arthroplasty.  We discussed based on the preoperative x-rays likely plan on open reduction internal fixation although will be ready for revision arthroplasty if needed if the stem is in fact loose intraoperatively.      Plan:We discussed the diagnosis and findings with the patient at length.  We reviewed possible non operative and operative interventions and the risks and benefits of each of these.  she had a chance to ask questions and all of these were answered to her satisfaction. The patient chose to proceed with   surgical intervention. Risks and benefits of surgery were discussed which include but are not limited to bleeding, infection, neurovascular damage, malunion, nonunion, instability, limb length discrepancy, DVT, PE, MI, Stroke and death. They understand these risks and wish to proceed.      Giorgio Galvan MD  Orthopedic Trauma Surgery

## 2023-08-29 NOTE — ED NOTES
Pt resting comfortably in bed. Bed locked and in lowest position. No further needs at this time.

## 2023-08-29 NOTE — ED NOTES
Bedside report given to RN Shirlene    Pt on RA, connected to cardiac monitor, fall precautions in place (sign on door, call light within reach, bed locked/lowest position), no isolation

## 2023-08-29 NOTE — ED NOTES
Pt resting on gurney, connected to continuous monitoring, VS stable, NAD, purewick in place, call light within reach

## 2023-08-29 NOTE — HOSPITAL COURSE
Ms. Barbra العراقي is a pleasant 78-year-old woman with little known medical history other than depression for which she is prescribed Adderall (?)who presented 8/28/2023 with ground-level fall.      She presents after a ground-level fall at home.  She was walking out of her bathroom and tripped and fell onto her right hip and felt immediate pain.  She has a history of bilateral hip surgeries with hardware in place.  X-ray showed proximal femoral diaphyseal periprosthetic fracture.  Ortho was consulted, Dr. Galvan, plan for OR morning.    Patient noted to have normal vital signs on admission.  Notable lab findings include leukocytosis at 11.4, RBC 4.10, hematocrit 36.7, potassium 3.2, glucose 172, calcium 7.7, corrected calcium 8.2.  Hospital medicine consulted for management of proximal femoral diaphyseal periprosthetic fracture.    Patient anticipated to go to OR with orthopedics today Dr. Galvan.

## 2023-08-29 NOTE — PROGRESS NOTES
4 Eyes Skin Assessment Completed by JOB Ramirez & JOB Lopez     Head WDL  Ears WDL  Nose WDL  Mouth WDL  Neck WDL  Breast/Chest WDL  Shoulder Blades WDL  Spine WDL  (R) Arm/Elbow/Hand: Abrasion & bruising  (L) Arm/Elbow/Hand : Abrasion   Abdomen WDL  Groin: WDL  Scrotum/Coccyx/Buttocks: Redness, blanching   (R) Leg WDL  (L) Leg WDL  (R) Heel/Foot/Toe: calloused, flaky  (L) Heel/Foot/Toe: calloused, purple, flaky              Devices In Places: SCDs, purewick, pulse ox     Interventions In Place: Extra pillows in place, float heels on pillows      Possible Skin Injury: Yes, left big toe     Pictures Uploaded Into Epic: Yes   Wound Consult Placed: Yes

## 2023-08-29 NOTE — ED NOTES
Pt resting on gurney, connected to continuous monitoring, call light within reach, purewick in place, NAD

## 2023-08-29 NOTE — ANESTHESIA PREPROCEDURE EVALUATION
Case: [id number] Date/Time: 08/29/23 1829    Procedure: ORIF, FRACTURE, FEMUR (Right: Leg Upper)    Anesthesia type: General    Pre-op diagnosis: right femur fracture    Location: [facility] OR 16 / SURGERY [facility]    Surgeons: Giorgio Galvan M.D.          Relevant Problems   No relevant active problems       Physical Exam    Airway   Mallampati: II  TM distance: >3 FB  Neck ROM: full       Cardiovascular - normal exam  Rhythm: regular  Rate: normal  (-) murmur     Dental - normal exam           Pulmonary - normal exam  Breath sounds clear to auscultation     Abdominal    Neurological - normal exam                 Anesthesia Plan    ASA 2       Plan - general       Airway plan will be ETT          Induction: intravenous    Postoperative Plan: Postoperative administration of opioids is intended.    Pertinent diagnostic labs and testing reviewed    Informed Consent:    Anesthetic plan and risks discussed with patient.    Use of blood products discussed with: patient whom consented to blood products.

## 2023-08-29 NOTE — ED NOTES
Pt repositioned in bed, given medications per MAR, connected to continuous monitoring, VS stable, call light within reach

## 2023-08-29 NOTE — ED NOTES
Pt resting comfortably in bed. Bed locked and in lowest position. Call light within reach. Respirations even and unlabored. No further needs at this time.

## 2023-08-30 ENCOUNTER — PATIENT OUTREACH (OUTPATIENT)
Dept: SCHEDULING | Facility: IMAGING CENTER | Age: 78
End: 2023-08-30
Payer: MEDICARE

## 2023-08-30 ENCOUNTER — HOSPITAL ENCOUNTER (INPATIENT)
Facility: REHABILITATION | Age: 78
LOS: 12 days | DRG: 560 | End: 2023-09-11
Attending: PHYSICAL MEDICINE & REHABILITATION | Admitting: PHYSICAL MEDICINE & REHABILITATION
Payer: MEDICARE

## 2023-08-30 ENCOUNTER — APPOINTMENT (OUTPATIENT)
Dept: RADIOLOGY | Facility: MEDICAL CENTER | Age: 78
DRG: 481 | End: 2023-08-30
Attending: STUDENT IN AN ORGANIZED HEALTH CARE EDUCATION/TRAINING PROGRAM
Payer: MEDICARE

## 2023-08-30 VITALS
BODY MASS INDEX: 21.66 KG/M2 | RESPIRATION RATE: 16 BRPM | TEMPERATURE: 98.5 F | HEIGHT: 65 IN | WEIGHT: 130 LBS | OXYGEN SATURATION: 96 % | SYSTOLIC BLOOD PRESSURE: 103 MMHG | HEART RATE: 91 BPM | DIASTOLIC BLOOD PRESSURE: 51 MMHG

## 2023-08-30 DIAGNOSIS — F32.A DEPRESSION, UNSPECIFIED DEPRESSION TYPE: ICD-10-CM

## 2023-08-30 DIAGNOSIS — S72.001A CLOSED RIGHT HIP FRACTURE, INITIAL ENCOUNTER (HCC): ICD-10-CM

## 2023-08-30 LAB
AMORPH CRY #/AREA URNS HPF: PRESENT /HPF
ANION GAP SERPL CALC-SCNC: 9 MMOL/L (ref 7–16)
APPEARANCE UR: ABNORMAL
BACTERIA #/AREA URNS HPF: ABNORMAL /HPF
BASOPHILS # BLD AUTO: 0.1 % (ref 0–1.8)
BASOPHILS # BLD: 0.01 K/UL (ref 0–0.12)
BILIRUB UR QL STRIP.AUTO: NEGATIVE
BUN SERPL-MCNC: 18 MG/DL (ref 8–22)
CALCIUM SERPL-MCNC: 7.8 MG/DL (ref 8.5–10.5)
CHLORIDE SERPL-SCNC: 106 MMOL/L (ref 96–112)
CO2 SERPL-SCNC: 22 MMOL/L (ref 20–33)
COLOR UR: YELLOW
CREAT SERPL-MCNC: 0.95 MG/DL (ref 0.5–1.4)
EOSINOPHIL # BLD AUTO: 0 K/UL (ref 0–0.51)
EOSINOPHIL NFR BLD: 0 % (ref 0–6.9)
EPI CELLS #/AREA URNS HPF: ABNORMAL /HPF
ERYTHROCYTE [DISTWIDTH] IN BLOOD BY AUTOMATED COUNT: 45.5 FL (ref 35.9–50)
GFR SERPLBLD CREATININE-BSD FMLA CKD-EPI: 60 ML/MIN/1.73 M 2
GLUCOSE SERPL-MCNC: 189 MG/DL (ref 65–99)
GLUCOSE UR STRIP.AUTO-MCNC: NEGATIVE MG/DL
HCT VFR BLD AUTO: 29 % (ref 37–47)
HGB BLD-MCNC: 9.3 G/DL (ref 12–16)
HYALINE CASTS #/AREA URNS LPF: ABNORMAL /LPF
IMM GRANULOCYTES # BLD AUTO: 0.06 K/UL (ref 0–0.11)
IMM GRANULOCYTES NFR BLD AUTO: 0.6 % (ref 0–0.9)
KETONES UR STRIP.AUTO-MCNC: ABNORMAL MG/DL
LEUKOCYTE ESTERASE UR QL STRIP.AUTO: ABNORMAL
LYMPHOCYTES # BLD AUTO: 1.21 K/UL (ref 1–4.8)
LYMPHOCYTES NFR BLD: 12.9 % (ref 22–41)
MCH RBC QN AUTO: 29.9 PG (ref 27–33)
MCHC RBC AUTO-ENTMCNC: 32.1 G/DL (ref 32.2–35.5)
MCV RBC AUTO: 93.2 FL (ref 81.4–97.8)
MICRO URNS: ABNORMAL
MONOCYTES # BLD AUTO: 0.88 K/UL (ref 0–0.85)
MONOCYTES NFR BLD AUTO: 9.4 % (ref 0–13.4)
NEUTROPHILS # BLD AUTO: 7.21 K/UL (ref 1.82–7.42)
NEUTROPHILS NFR BLD: 77 % (ref 44–72)
NITRITE UR QL STRIP.AUTO: NEGATIVE
NRBC # BLD AUTO: 0 K/UL
NRBC BLD-RTO: 0 /100 WBC (ref 0–0.2)
PH UR STRIP.AUTO: 7 [PH] (ref 5–8)
PLATELET # BLD AUTO: 228 K/UL (ref 164–446)
PMV BLD AUTO: 10.3 FL (ref 9–12.9)
POTASSIUM SERPL-SCNC: 5.4 MMOL/L (ref 3.6–5.5)
PROT UR QL STRIP: 300 MG/DL
RBC # BLD AUTO: 3.11 M/UL (ref 4.2–5.4)
RBC # URNS HPF: ABNORMAL /HPF
RBC UR QL AUTO: ABNORMAL
SODIUM SERPL-SCNC: 137 MMOL/L (ref 135–145)
SP GR UR STRIP.AUTO: 1.02
UROBILINOGEN UR STRIP.AUTO-MCNC: 0.2 MG/DL
WBC # BLD AUTO: 9.4 K/UL (ref 4.8–10.8)
WBC #/AREA URNS HPF: ABNORMAL /HPF

## 2023-08-30 PROCEDURE — A9270 NON-COVERED ITEM OR SERVICE: HCPCS | Performed by: PHYSICAL MEDICINE & REHABILITATION

## 2023-08-30 PROCEDURE — 770010 HCHG ROOM/CARE - REHAB SEMI PRIVAT*

## 2023-08-30 PROCEDURE — 51798 US URINE CAPACITY MEASURE: CPT

## 2023-08-30 PROCEDURE — 700102 HCHG RX REV CODE 250 W/ 637 OVERRIDE(OP): Performed by: STUDENT IN AN ORGANIZED HEALTH CARE EDUCATION/TRAINING PROGRAM

## 2023-08-30 PROCEDURE — 700102 HCHG RX REV CODE 250 W/ 637 OVERRIDE(OP): Performed by: NURSE PRACTITIONER

## 2023-08-30 PROCEDURE — 80048 BASIC METABOLIC PNL TOTAL CA: CPT

## 2023-08-30 PROCEDURE — 87186 SC STD MICRODIL/AGAR DIL: CPT

## 2023-08-30 PROCEDURE — 97602 WOUND(S) CARE NON-SELECTIVE: CPT

## 2023-08-30 PROCEDURE — A9270 NON-COVERED ITEM OR SERVICE: HCPCS | Performed by: STUDENT IN AN ORGANIZED HEALTH CARE EDUCATION/TRAINING PROGRAM

## 2023-08-30 PROCEDURE — 700102 HCHG RX REV CODE 250 W/ 637 OVERRIDE(OP): Performed by: PHYSICAL MEDICINE & REHABILITATION

## 2023-08-30 PROCEDURE — 85025 COMPLETE CBC W/AUTO DIFF WBC: CPT

## 2023-08-30 PROCEDURE — 87077 CULTURE AEROBIC IDENTIFY: CPT

## 2023-08-30 PROCEDURE — 99222 1ST HOSP IP/OBS MODERATE 55: CPT | Performed by: PHYSICAL MEDICINE & REHABILITATION

## 2023-08-30 PROCEDURE — 700111 HCHG RX REV CODE 636 W/ 250 OVERRIDE (IP): Performed by: STUDENT IN AN ORGANIZED HEALTH CARE EDUCATION/TRAINING PROGRAM

## 2023-08-30 PROCEDURE — 94760 N-INVAS EAR/PLS OXIMETRY 1: CPT

## 2023-08-30 PROCEDURE — 36415 COLL VENOUS BLD VENIPUNCTURE: CPT

## 2023-08-30 PROCEDURE — 87086 URINE CULTURE/COLONY COUNT: CPT

## 2023-08-30 PROCEDURE — 99239 HOSP IP/OBS DSCHRG MGMT >30: CPT | Performed by: STUDENT IN AN ORGANIZED HEALTH CARE EDUCATION/TRAINING PROGRAM

## 2023-08-30 PROCEDURE — 81001 URINALYSIS AUTO W/SCOPE: CPT

## 2023-08-30 PROCEDURE — 97166 OT EVAL MOD COMPLEX 45 MIN: CPT

## 2023-08-30 PROCEDURE — 97162 PT EVAL MOD COMPLEX 30 MIN: CPT

## 2023-08-30 PROCEDURE — 700111 HCHG RX REV CODE 636 W/ 250 OVERRIDE (IP): Performed by: PHYSICAL MEDICINE & REHABILITATION

## 2023-08-30 PROCEDURE — A9270 NON-COVERED ITEM OR SERVICE: HCPCS | Performed by: NURSE PRACTITIONER

## 2023-08-30 PROCEDURE — 73552 X-RAY EXAM OF FEMUR 2/>: CPT | Mod: RT

## 2023-08-30 RX ORDER — OXYCODONE HYDROCHLORIDE 5 MG/1
5 TABLET ORAL EVERY 4 HOURS PRN
Status: DISCONTINUED | OUTPATIENT
Start: 2023-08-30 | End: 2023-08-31

## 2023-08-30 RX ORDER — IBUPROFEN 800 MG/1
800 TABLET, FILM COATED ORAL 3 TIMES DAILY
Qty: 30 TABLET | Status: ON HOLD
Start: 2023-08-30 | End: 2023-09-10

## 2023-08-30 RX ORDER — ECHINACEA PURPUREA EXTRACT 125 MG
2 TABLET ORAL PRN
Status: DISCONTINUED | OUTPATIENT
Start: 2023-08-30 | End: 2023-09-11 | Stop reason: HOSPADM

## 2023-08-30 RX ORDER — AMMONIUM LACTATE 12 G/100G
LOTION TOPICAL 2 TIMES DAILY
Status: DISCONTINUED | OUTPATIENT
Start: 2023-08-30 | End: 2023-08-30 | Stop reason: HOSPADM

## 2023-08-30 RX ORDER — AMOXICILLIN 250 MG
2 CAPSULE ORAL 2 TIMES DAILY
Status: CANCELLED | OUTPATIENT
Start: 2023-08-30

## 2023-08-30 RX ORDER — POLYETHYLENE GLYCOL 3350 17 G/17G
1 POWDER, FOR SOLUTION ORAL DAILY
Status: DISCONTINUED | OUTPATIENT
Start: 2023-08-30 | End: 2023-08-30 | Stop reason: HOSPADM

## 2023-08-30 RX ORDER — ONDANSETRON 2 MG/ML
4 INJECTION INTRAMUSCULAR; INTRAVENOUS 4 TIMES DAILY PRN
Status: DISCONTINUED | OUTPATIENT
Start: 2023-08-30 | End: 2023-09-11 | Stop reason: HOSPADM

## 2023-08-30 RX ORDER — OXYCODONE HYDROCHLORIDE 10 MG/1
10 TABLET ORAL EVERY 4 HOURS PRN
Status: CANCELLED | OUTPATIENT
Start: 2023-08-30

## 2023-08-30 RX ORDER — OXYCODONE HYDROCHLORIDE 10 MG/1
10 TABLET ORAL EVERY 4 HOURS PRN
Status: DISCONTINUED | OUTPATIENT
Start: 2023-08-30 | End: 2023-08-31

## 2023-08-30 RX ORDER — AMOXICILLIN 250 MG
2 CAPSULE ORAL 2 TIMES DAILY
Status: DISCONTINUED | OUTPATIENT
Start: 2023-08-30 | End: 2023-09-11 | Stop reason: HOSPADM

## 2023-08-30 RX ORDER — OMEPRAZOLE 20 MG/1
20 CAPSULE, DELAYED RELEASE ORAL DAILY
Status: DISCONTINUED | OUTPATIENT
Start: 2023-08-30 | End: 2023-09-11 | Stop reason: HOSPADM

## 2023-08-30 RX ORDER — AMOXICILLIN 250 MG
2 CAPSULE ORAL 2 TIMES DAILY
Status: DISCONTINUED | OUTPATIENT
Start: 2023-08-30 | End: 2023-08-30 | Stop reason: HOSPADM

## 2023-08-30 RX ORDER — ALUMINA, MAGNESIA, AND SIMETHICONE 2400; 2400; 240 MG/30ML; MG/30ML; MG/30ML
20 SUSPENSION ORAL
Status: DISCONTINUED | OUTPATIENT
Start: 2023-08-30 | End: 2023-09-11 | Stop reason: HOSPADM

## 2023-08-30 RX ORDER — ACETAMINOPHEN 500 MG
500 TABLET ORAL EVERY 6 HOURS PRN
Status: DISCONTINUED | OUTPATIENT
Start: 2023-08-30 | End: 2023-09-11 | Stop reason: HOSPADM

## 2023-08-30 RX ORDER — TRAZODONE HYDROCHLORIDE 50 MG/1
50 TABLET ORAL
Status: DISCONTINUED | OUTPATIENT
Start: 2023-08-30 | End: 2023-09-11 | Stop reason: HOSPADM

## 2023-08-30 RX ORDER — CARBOXYMETHYLCELLULOSE SODIUM 5 MG/ML
1 SOLUTION/ DROPS OPHTHALMIC PRN
Status: DISCONTINUED | OUTPATIENT
Start: 2023-08-30 | End: 2023-09-11 | Stop reason: HOSPADM

## 2023-08-30 RX ORDER — ONDANSETRON 4 MG/1
4 TABLET, ORALLY DISINTEGRATING ORAL 4 TIMES DAILY PRN
Status: DISCONTINUED | OUTPATIENT
Start: 2023-08-30 | End: 2023-09-11 | Stop reason: HOSPADM

## 2023-08-30 RX ORDER — UREA 10 %
5 LOTION (ML) TOPICAL NIGHTLY
Status: DISCONTINUED | OUTPATIENT
Start: 2023-08-30 | End: 2023-08-30

## 2023-08-30 RX ORDER — ENOXAPARIN SODIUM 100 MG/ML
30 INJECTION SUBCUTANEOUS EVERY 12 HOURS
Status: CANCELLED | OUTPATIENT
Start: 2023-08-30

## 2023-08-30 RX ORDER — POLYETHYLENE GLYCOL 3350 17 G/17G
17 POWDER, FOR SOLUTION ORAL DAILY
Refills: 3 | Status: ON HOLD
Start: 2023-08-30 | End: 2023-09-10

## 2023-08-30 RX ORDER — HYDRALAZINE HYDROCHLORIDE 25 MG/1
25 TABLET, FILM COATED ORAL EVERY 8 HOURS PRN
Status: DISCONTINUED | OUTPATIENT
Start: 2023-08-30 | End: 2023-09-11 | Stop reason: HOSPADM

## 2023-08-30 RX ORDER — OXYCODONE HYDROCHLORIDE 5 MG/1
5 TABLET ORAL EVERY 4 HOURS PRN
Status: CANCELLED | OUTPATIENT
Start: 2023-08-30

## 2023-08-30 RX ORDER — AMMONIUM LACTATE 12 G/100G
LOTION TOPICAL 2 TIMES DAILY
Status: CANCELLED | OUTPATIENT
Start: 2023-08-30

## 2023-08-30 RX ORDER — ENOXAPARIN SODIUM 100 MG/ML
30 INJECTION SUBCUTANEOUS EVERY 12 HOURS
Status: DISCONTINUED | OUTPATIENT
Start: 2023-08-30 | End: 2023-08-31

## 2023-08-30 RX ORDER — POLYETHYLENE GLYCOL 3350 17 G/17G
1 POWDER, FOR SOLUTION ORAL DAILY
Status: CANCELLED | OUTPATIENT
Start: 2023-08-31

## 2023-08-30 RX ORDER — AMOXICILLIN 250 MG
2 CAPSULE ORAL 2 TIMES DAILY
Qty: 30 TABLET | Refills: 0 | Status: ON HOLD | OUTPATIENT
Start: 2023-08-30 | End: 2023-09-10

## 2023-08-30 RX ORDER — OXYCODONE HYDROCHLORIDE 5 MG/1
2.5-5 TABLET ORAL
Qty: 30 TABLET | Refills: 0 | Status: ON HOLD
Start: 2023-08-30 | End: 2023-09-10

## 2023-08-30 RX ORDER — BISACODYL 10 MG
10 SUPPOSITORY, RECTAL RECTAL
Status: DISCONTINUED | OUTPATIENT
Start: 2023-08-30 | End: 2023-08-30 | Stop reason: HOSPADM

## 2023-08-30 RX ORDER — LANOLIN ALCOHOL/MO/W.PET/CERES
4.5 CREAM (GRAM) TOPICAL
Status: DISCONTINUED | OUTPATIENT
Start: 2023-08-30 | End: 2023-09-11 | Stop reason: HOSPADM

## 2023-08-30 RX ORDER — AMMONIUM LACTATE 12 G/100G
LOTION TOPICAL 2 TIMES DAILY
Status: DISCONTINUED | OUTPATIENT
Start: 2023-08-30 | End: 2023-09-11 | Stop reason: HOSPADM

## 2023-08-30 RX ORDER — ACETAMINOPHEN 500 MG
1000 TABLET ORAL EVERY 6 HOURS
Qty: 30 TABLET | Refills: 0 | Status: SHIPPED | OUTPATIENT
Start: 2023-08-30

## 2023-08-30 RX ORDER — POLYETHYLENE GLYCOL 3350 17 G/17G
1 POWDER, FOR SOLUTION ORAL DAILY
Status: DISCONTINUED | OUTPATIENT
Start: 2023-08-31 | End: 2023-09-11 | Stop reason: HOSPADM

## 2023-08-30 RX ORDER — ENOXAPARIN SODIUM 100 MG/ML
40 INJECTION SUBCUTANEOUS DAILY
Status: ON HOLD | DISCHARGE
Start: 2023-08-30 | End: 2023-09-10

## 2023-08-30 RX ORDER — BISACODYL 10 MG
10 SUPPOSITORY, RECTAL RECTAL
Status: DISCONTINUED | OUTPATIENT
Start: 2023-08-30 | End: 2023-09-11 | Stop reason: HOSPADM

## 2023-08-30 RX ORDER — BISACODYL 10 MG
10 SUPPOSITORY, RECTAL RECTAL
Status: CANCELLED | OUTPATIENT
Start: 2023-08-30

## 2023-08-30 RX ADMIN — OXYCODONE HYDROCHLORIDE 5 MG: 5 TABLET ORAL at 09:06

## 2023-08-30 RX ADMIN — SENNOSIDES AND DOCUSATE SODIUM 2 TABLET: 50; 8.6 TABLET ORAL at 06:10

## 2023-08-30 RX ADMIN — ACETAMINOPHEN 1000 MG: 500 TABLET, FILM COATED ORAL at 13:44

## 2023-08-30 RX ADMIN — ENOXAPARIN SODIUM 30 MG: 100 INJECTION SUBCUTANEOUS at 09:06

## 2023-08-30 RX ADMIN — IBUPROFEN 800 MG: 800 TABLET, FILM COATED ORAL at 06:11

## 2023-08-30 RX ADMIN — Medication 4.5 MG: at 20:48

## 2023-08-30 RX ADMIN — ACETAMINOPHEN 1000 MG: 500 TABLET, FILM COATED ORAL at 06:10

## 2023-08-30 RX ADMIN — OMEPRAZOLE 20 MG: 20 CAPSULE, DELAYED RELEASE ORAL at 18:38

## 2023-08-30 RX ADMIN — Medication: at 13:45

## 2023-08-30 RX ADMIN — Medication: at 20:54

## 2023-08-30 RX ADMIN — IBUPROFEN 800 MG: 800 TABLET, FILM COATED ORAL at 13:45

## 2023-08-30 RX ADMIN — ENOXAPARIN SODIUM 30 MG: 100 INJECTION SUBCUTANEOUS at 20:47

## 2023-08-30 ASSESSMENT — LIFESTYLE VARIABLES
HOW MANY TIMES IN THE PAST YEAR HAVE YOU HAD 5 OR MORE DRINKS IN A DAY: 0
EVER FELT BAD OR GUILTY ABOUT YOUR DRINKING: NO
ALCOHOL_USE: NO
CONSUMPTION TOTAL: NEGATIVE
TOTAL SCORE: 0
HAVE PEOPLE ANNOYED YOU BY CRITICIZING YOUR DRINKING: NO
AVERAGE NUMBER OF DAYS PER WEEK YOU HAVE A DRINK CONTAINING ALCOHOL: 0
EVER HAD A DRINK FIRST THING IN THE MORNING TO STEADY YOUR NERVES TO GET RID OF A HANGOVER: NO
TOTAL SCORE: 0
HAVE YOU EVER FELT YOU SHOULD CUT DOWN ON YOUR DRINKING: NO
ON A TYPICAL DAY WHEN YOU DRINK ALCOHOL HOW MANY DRINKS DO YOU HAVE: 0
TOTAL SCORE: 0

## 2023-08-30 ASSESSMENT — COGNITIVE AND FUNCTIONAL STATUS - GENERAL
TOILETING: A LOT
EATING MEALS: A LITTLE
HELP NEEDED FOR BATHING: A LOT
STANDING UP FROM CHAIR USING ARMS: A LITTLE
PERSONAL GROOMING: A LITTLE
DRESSING REGULAR LOWER BODY CLOTHING: A LOT
SUGGESTED CMS G CODE MODIFIER MOBILITY: CL
SUGGESTED CMS G CODE MODIFIER DAILY ACTIVITY: CK
DRESSING REGULAR UPPER BODY CLOTHING: A LITTLE
MOBILITY SCORE: 11
DAILY ACTIVITIY SCORE: 15
TURNING FROM BACK TO SIDE WHILE IN FLAT BAD: UNABLE
MOVING FROM LYING ON BACK TO SITTING ON SIDE OF FLAT BED: UNABLE
MOVING TO AND FROM BED TO CHAIR: UNABLE
CLIMB 3 TO 5 STEPS WITH RAILING: A LOT
WALKING IN HOSPITAL ROOM: A LITTLE

## 2023-08-30 ASSESSMENT — GAIT ASSESSMENTS
DISTANCE (FEET): 16
GAIT LEVEL OF ASSIST: CONTACT GUARD ASSIST
ASSISTIVE DEVICE: FRONT WHEEL WALKER

## 2023-08-30 ASSESSMENT — PAIN DESCRIPTION - PAIN TYPE: TYPE: ACUTE PAIN;SURGICAL PAIN

## 2023-08-30 ASSESSMENT — PATIENT HEALTH QUESTIONNAIRE - PHQ9
1. LITTLE INTEREST OR PLEASURE IN DOING THINGS: NOT AT ALL
SUM OF ALL RESPONSES TO PHQ9 QUESTIONS 1 AND 2: 0
2. FEELING DOWN, DEPRESSED, IRRITABLE, OR HOPELESS: NOT AT ALL

## 2023-08-30 ASSESSMENT — ACTIVITIES OF DAILY LIVING (ADL): TOILETING: INDEPENDENT

## 2023-08-30 ASSESSMENT — FIBROSIS 4 INDEX: FIB4 SCORE: 1.62

## 2023-08-30 NOTE — OP REPORT
DATE OF OPERATION: 8/29/2023     PREOPERATIVE DIAGNOSIS: [place] B1 periprosthetic proximal femur fracture around the well-seated press-fit femoral stem    POSTOPERATIVE DIAGNOSIS: Same    PROCEDURE PERFORMED: Right proximal femur periprosthetic fracture open reduction internal fixation    SURGEON: Giorgio Galvan M.D.     ASSISTANT: Sammy Newman MD, fellow    ANESTHESIA: General    SPECIMEN: None    ESTIMATED BLOOD LOSS: 50  mL    IMPLANTS: [place] 4.5 mm broad plate, cerclage cables x3      INDICATIONS: The patient is a 78 y.o. female who presented with right proximal femur periprosthetic fracture.  X-rays revealed what appeared to be a well-seated stem with metaphyseal fit.  There is no obvious subsidence noted..  I discussed the risks and benefits of the procedure which include but are not limited to risks of infection, wound healing complication, neurovascular injury, pain, malunion, non-union, malrotation, and the medical risks of anesthesia including MI, stroke, and death.  Alternatives to surgery were also discussed, including non-operative management, which I did not recommend.  The patient was in agreement with the plan to proceed, and the informed consent was signed and documented.  I met with the patient pre-operatively and marked the operative extremity with their agreement.  We proceeded to the operating room.     DESCRIPTION OF PROCEDURE:  Patient was seen in the preoperative holding area on the day of surgery. The operative site was marked with my initials.  she was taken to the operating room and placed lateral on the operative table.  Anesthesia was induced.  The operative extremity was prepped and draped in the normal sterile fashion.  Operative pause was conducted and the correct patient, site, side, procedure, and surgeon's initials on extremity were identified.  The posterior scar was reincised and extended distally past the level of the fracture.  Fascia split in line with skin  incision.  A subvastus approach was then performed using Lopez elevator to release the vastus.  Fracture site was identified cleaned of hematoma.  The stem was at this time checked and noted to be stable without any obvious rotation instability.  Decision was made at this point time to proceed with open reduction internal fixation rather than revision arthroplasty.  Using some axial traction and rotation the fracture was reduced and held provisionally with spent down lobster clamps.  This was checked on AP and lateral views noted be anatomic.  We then placed 2 cerclage cables provisionally holding the fracture.  Clamps were then removed.  We then placed a long 4.5 mm broad lateral plate that was contoured to fit the vastus flare.  Once appropriate contour was obtained placed the plate in position this was checked on AP and lateral views noted to be in good position.  We then drilled and placed several nonlocking screws allow for compression of the plate down to bone.  Next we placed a single cerclage wire around the distal aspect of the stem to allow for additional fixation of the plate to the bone.  We then drilled several periprosthetic locking screws.  We were able to place a single proximal bicortical screw around the implant to again allow for good fixation.  Finally #we felt we had appropriate screw spread and fixation the final images were obtained indicating appropriate reduction implant position.  The wound was then thoroughly irrigated sterile saline.  Vancomycin tobramycin powder was placed in the wound.  Wound was then closed in layered fashion with #1 strata fix for fascia.  This followed by 0 Vicryl 2-0 Vicryl staples for skin.  Incisional wound VAC was then placed.  The patient was awoken taken to PACU stable condition.    POSTOPERATIVE PLAN: Weightbearing as tolerated right lower extremity weight bearing.  Mobilize with physical and occupational therapies.  DVT prophylaxis with SCDs and Lovenox until  mobilizing independently and then can be switched to aspirin for 4 weeks.  The patient will follow up in clinic in 2 weeks to check wounds and remove sutures/staples.      ____________________________________   Giorgio Galvan M.D.   DD: 8/29/2023  7:13 PM

## 2023-08-30 NOTE — THERAPY
Physical Therapy   Initial Evaluation     Patient Name: Barbra العراقي  Age:  78 y.o., Sex:  female  Medical Record #: [id number]  Today's Date: 8/30/2023     Precautions  Precautions: Fall Risk;Weight Bearing As Tolerated Right Lower Extremity    Assessment  Patient is 78 y.o. female who presented on 08/28 with ground level fall. She was found to have R proximal femoral diaphyseal periprosthetic fracture. She underwent ORIF on 8/29.  Past medical history includes depression.       During PT eval, patient appeared to be apprehensive at the beginning of the mobility, however with increased time & encouragement, patient felt more comfortable with mobility. Patient required Min A for bed mobility, CGA for sit-stand/transfers & ambulation. She was educated about appropriate LE placement, sequencing of LE & FWW for safe mobility and alleviate pain.     Patient will continue to benefit from acute PT services to address farther distance ambulation and recommend post-acute placement to facilitate return to prior level of function (independent with mobility) & safe discharge home.     Plan    Physical Therapy Initial Treatment Plan   Treatment Plan : Bed Mobility, Gait Training, Therapeutic Activities, Therapeutic Exercise  Treatment Frequency: 4 Times per Week  Duration: Until Therapy Goals Met    DC Equipment Recommendations: Unable to determine at this time  Discharge Recommendations: Recommend post-acute placement for additional physical therapy services prior to discharge home      Objective       08/30/23 1021   Total Time Spent   PT Total Time Yes   PT Evaluation Time Spent (Mins) 18   PT Gait Training Time Spent (Mins) 10   PT Total Time Spent (Calculated) 28   Precautions   Precautions Fall Risk;Weight Bearing As Tolerated Right Lower Extremity   Vitals   Pulse 97   Patient BP Position Sitting  (Post ambulation)   Pulse Oximetry 97 %   O2 Delivery Device None - Room Air   Pain 0 - 10 Group   Therapist Pain Assessment  0  (Patient denied pain during the session)   Prior Living Situation   Prior Services None   Housing / Facility 1 Story Apartment / Condo  (2nd floor apartment)   Steps Into Home 0   Steps In Home 0   Elevator Yes   Equipment Owned Single Point Cane;Front-Wheel Walker   Lives with - Patient's Self Care Capacity Alone and Able to Care For Self   Comments Patient does not have any support at home, has a daughter in [place], son in [place].   Prior Level of Functional Mobility   Bed Mobility Independent   Transfer Status Independent   Ambulation Independent   Ambulation Distance Community   Assistive Devices Used Single Point Cane   History of Falls   History of Falls Yes   Date of Last Fall   (1 fall prior to arrival, 2 additional falls in the last few months)   Cognition    Level of Consciousness Alert   Ability To Follow Commands 3 Step   Passive ROM Lower Body   Passive ROM Lower Body X   Comments R hip-NT due to recent Sx   Active ROM Lower Body    Active ROM Lower Body  X   Comments R hip, Knee-slightly limited due to pain   Strength Lower Body   Lower Body Strength  X   Comments R hip-3-/5, Knee 3/5, Ankle 4/5; LLE-WFL   Sensation Lower Body   Comments Reports slight numbness in R foot   Balance Assessment   Sitting Balance (Static) Good   Sitting Balance (Dynamic) Fair +   Standing Balance (Static) Fair -   Standing Balance (Dynamic) Fair -   Bed Mobility    Supine to Sit Minimal Assist  (HOB raised, use of bed rail)   Comments Required cues for appropriate technique & sequencing of LE   Gait Analysis   Gait Level Of Assist Contact Guard Assist   Assistive Device Front Wheel Walker   Distance (Feet) 16   Deviation Bradykinetic;Decreased Base Of Support;Step To;Antalgic;Other (Comment)  (Reduced step & stride length)   Weight Bearing Status Patient able to maintain WBAT on RLE   Comments Required cues for sequencing of LE & FWW, weight shifts, appropriate use of FWW, directions   Functional Mobility   Sit to Stand Contact  Guard Assist   Bed, Chair, Wheelchair Transfer Contact Guard Assist   Transfer Method Stand Step   Comments With FWW; Cues for sequencing & directions   How much difficulty does the patient currently have...   Turning over in bed (including adjusting bedclothes, sheets and blankets)? 1   Sitting down on and standing up from a chair with arms (e.g., wheelchair, bedside commode, etc.) 1   Moving from lying on back to sitting on the side of the bed? 1   How much help from another person does the patient currently need...   Moving to and from a bed to a chair (including a wheelchair)? 3   Need to walk in a hospital room? 3   Climbing 3-5 steps with a railing? 2   6 clicks Mobility Score 11   Patient / Family Goals    Patient / Family Goal #1 To be able to walk independently   Short Term Goals    Short Term Goal # 1 Patient will perform supine-sit with HOB flat, with supervision in 6 visits   Short Term Goal # 2 Patient will perform sit-stand with FWW with supervision in 6 visits   Short Term Goal # 3 Patient will ambulate in the room and/or hallway >100 feet with FWW in 6 visits   Education Group   Education Provided Role of Physical Therapist;Gait Training   Role of Physical Therapist Patient Response Patient;Acceptance;Demonstration;Verbal Demonstration;Action Demonstration   Gait Training Patient Response Patient;Acceptance;Demonstration;Verbal Demonstration;Action Demonstration;Reinforcement Needed   Physical Therapy Initial Treatment Plan    Treatment Plan  Bed Mobility;Gait Training;Therapeutic Activities;Therapeutic Exercise   Treatment Frequency 4 Times per Week   Duration Until Therapy Goals Met   Problem List    Problems Impaired Bed Mobility;Impaired Transfers;Impaired Ambulation;Pain;Decreased Activity Tolerance   Anticipated Discharge Equipment and Recommendations   DC Equipment Recommendations Unable to determine at this time   Discharge Recommendations Recommend post-acute placement for additional  physical therapy services prior to discharge home   Interdisciplinary Plan of Care Collaboration   IDT Collaboration with  Nursing;Occupational Therapist   Patient Position at End of Therapy Seated;Chair Alarm On;Call Light within Reach;Tray Table within Reach   Session Information   Date / Session Number  8/30 1(1/4, 9/5)

## 2023-08-30 NOTE — PREADMISSION SCREENING NOTE
Pre-Admission Screening Form    Patient Information:   Name: Barbra العراقي     MRN: [id number]       : 1945      Age: 78 y.o.   Gender: female      Race: White [7]       Marital Status:  [4]  Family Contact: Malcom,April        Relationship: Daughter [2]  Home Phone:            Cell Phone: [phone]  Advanced Directives: None  Code Status:  FULL  Current Attending Provider: Perlita Fang M.D.  Referring Physician: Dr. Galvan      Physiatrist Consult: Dr. Carrillo       Referral Date: 23  Primary Payor Source:  MEDICARE  Secondary Payor Source:  MEDICAID FFS    Medical Information:   Date of Admission to [facility] Care [facility]  Room Number: T303/02  Rehabilitation Diagnosis: 0008.11 - Orthopaedic Disorders: Status Post Unilateral Hip Fracture    There is no immunization history on file for this patient.  Allergies   Allergen Reactions    Ampicillin Unspecified     Hazy, bloated    Keflex [Cephalexin] Unspecified     Drowsy, altered     History reviewed. No pertinent past medical history.  Past Surgical History:   Procedure Laterality Date    ORIF, FRACTURE, FEMUR Right 2023    Procedure: ORIF, FRACTURE, FEMUR;  Surgeon: Giorgio Galvan M.D.;  Location: SURGERY [facility];  Service: Orthopedics       History Leading to Admission, Conditions that Caused the Need for Rehab (CMS):     Hospital Medicine History & Physical Note     Date of Service  2023     Primary Care Physician  No primary care provider on file.     Consultants  orthopedics     Specialist Names: Dr. Galvan     Code Status  Full Code     Chief Complaint      Chief Complaint   Patient presents with    T-5000 GLF         History of Presenting Illness  Barbra العراقي is a 78 y.o. female who presented 2023 with ground-level fall.  Is a pleasant 78-year-old woman with little known medical history other than depression for which she is prescribed Adderall (?)  Who presents after a ground-level fall at home.  She was  walking out of her bathroom and tripped and fell onto her right hip and felt immediate pain.  She has a history of bilateral hip surgeries with hardware in place.  X-ray showed proximal femoral diaphyseal periprosthetic fracture.  Ortho was consulted, Dr. Galvan, plan for OR morning.     I discussed the plan of care with patient.     Review of Systems  Review of Systems   Constitutional:  Negative for chills and fever.   HENT:  Negative for congestion and sore throat.    Eyes:  Negative for blurred vision and double vision.   Respiratory:  Negative for cough and shortness of breath.    Cardiovascular:  Negative for chest pain, palpitations and leg swelling.   Gastrointestinal:  Negative for abdominal pain, heartburn and nausea.   Genitourinary:  Negative for dysuria and urgency.   Musculoskeletal:  Positive for falls and joint pain.   Skin:  Negative for itching and rash.   Neurological:  Negative for dizziness and headaches.         Past Medical History   has no past medical history on file.     Surgical History   has no past surgical history on file.      Family History  Patient unaware of family history  Family history reviewed with patient. There is no family history that is pertinent to the chief complaint.      Social History     Allergies        Allergies   Allergen Reactions    Ampicillin Unspecified       Hazy, bloated    Keflex [Cephalexin] Unspecified       Drowsy, altered         Medications  None         Physical Exam  Temp:  [36.9 °C (98.4 °F)] 36.9 °C (98.4 °F)  Pulse:  [65-89] 87  Resp:  [16-21] 16  BP: (109-157)/(63-67) 154/67  SpO2:  [96 %-98 %] 98 %  Blood Pressure : 109/63   Temperature: 36.9 °C (98.4 °F)   Pulse: 89   Respiration: 16   Pulse Oximetry: 96 %         Physical Exam  Constitutional:       General: She is not in acute distress.     Appearance: She is not ill-appearing or toxic-appearing.   HENT:      Head: Normocephalic and atraumatic.      Nose: Nose normal.      Mouth/Throat:       "Mouth: Mucous membranes are dry.      Pharynx: Oropharynx is clear.   Eyes:      Extraocular Movements: Extraocular movements intact.      Conjunctiva/sclera: Conjunctivae normal.   Cardiovascular:      Rate and Rhythm: Normal rate and regular rhythm.      Pulses: Normal pulses.      Heart sounds: Normal heart sounds.   Pulmonary:      Effort: Pulmonary effort is normal.      Breath sounds: Normal breath sounds.   Abdominal:      General: Abdomen is flat.      Palpations: Abdomen is soft.   Musculoskeletal:         General: Deformity present. No swelling.      Cervical back: Normal range of motion and neck supple.      Comments: Right hip abducted and externally rotated   Skin:     General: Skin is warm and dry.      Capillary Refill: Capillary refill takes less than 2 seconds.   Neurological:      General: No focal deficit present.      Mental Status: She is oriented to person, place, and time.            Laboratory:      Recent Labs     08/29/23  0001   WBC 11.4*   RBC 4.10*   HEMOGLOBIN 12.1   HEMATOCRIT 36.7*   MCV 89.5   MCH 29.5   MCHC 33.0   RDW 42.7   PLATELETCT 239   MPV 10.0          Recent Labs     08/29/23  0001   SODIUM 140   POTASSIUM 3.2*   CHLORIDE 108   CO2 20   GLUCOSE 172*   BUN 17   CREATININE 0.61   CALCIUM 7.7*          Recent Labs     08/29/23  0001   ALTSGPT 12   ASTSGOT 16   ALKPHOSPHAT 98   TBILIRUBIN 0.3   GLUCOSE 172*          No results for input(s): \"NTPROBNP\" in the last 72 hours.      No results for input(s): \"TROPONINT\" in the last 72 hours.     Imaging:  DX-FEMUR-2+ RIGHT   Final Result           1.  Proximal femoral diaphyseal periprosthetic fracture.       DX-PELVIS-1 OR 2 VIEWS   Final Result           1.  No acute traumatic bony injury.       DX-CHEST-PORTABLE (1 VIEW)   Final Result           1.  No acute cardiopulmonary disease.   2.  Atherosclerosis   3.  Perihilar interstitial prominence and bronchial wall cuffing, appearance suggests changes of underlying bronchial " inflammation, consider bronchitis.       CT-CSPINE WITHOUT PLUS RECONS   Final Result           1.  Multilevel degenerative changes of the cervical spine limit diagnostic sensitivity of this examination, otherwise no acute traumatic bony injury of the cervical spine is apparent.   2.  Atherosclerosis       CT-HEAD W/O   Final Result           1.  No acute intracranial abnormality is identified, there are nonspecific white matter changes, commonly associated with small vessel ischemic disease.  Associated mild cerebral atrophy is noted.   2.  Atherosclerosis.                 EKG:  I have personally reviewed the images and compared with prior images. and My impression is: Sinus rhythm with a rate of 68, QTc 538, no ST changes     Assessment/Plan:  Justification for Admission Status  I anticipate this patient will require at least two midnights for appropriate medical management, necessitating inpatient admission because of fracture requiring surgical repair     Patient will need a Med/Surg bed on EMERGENCY service .  The need is secondary to above.     * Closed right hip fracture, initial encounter (HCC)- (present on admission)  Assessment & Plan  Ortho consulted, Dr. Galvan, plan for OR in morning  N.p.o.  Pain control  PT when appropriate        Leukocytosis  Assessment & Plan  WBC 11.4  Likely reactive  No localizing signs of infection     Depression  Assessment & Plan  Apparently on Adderall for this problem, unusual choice  Holding off on giving any more Adderall here in house     Hypokalemia  Assessment & Plan  3.2 on presentation  Replacement ordered  We will check mag           VTE prophylaxis: SCDs/TEDs and enoxaparin ppx          Physical Medicine and Rehabilitation Consultation                                                                                  Date of initial consultation: 8/30/2023  Requesting provider: ordered by Giorgio Galvan M.D. at 08/30/23 8752   Consulting provider: Iesha  GENA Carrillo  Reason for consultation: assess for acute inpatient rehab appropriateness  LOS: 1 Day(s)     Chief complaint: R hip pain after a GLF      HPI: The patient is a 78 y.o.  female with a past medical history of depression;  who presented on 8/28/2023 11:28 PM with R hip pain after GLF. Per documentation, patient ad been walking out of her bathroom and tripped and fell on her right hip. Upon eval in the ED, images showed a proximal femoral diaphyseal periprosthetic fracture. Ortho was consulted, and patient was taken to the OR on 8/29 for ORIF of the R proximal femur periprosthetic fracture performed y Dr. Galvan. Post op, patient is WBAT RLE. Her hospital course has weston notable for post op ABLA and hyperglycemia. Patient has been able to participate with therapies, she is MIN A  for mobility.      Patient seen and examined at bedside, reports she feels ok. Reports she finally has an appetite. Has not been able to eat until now, reports the pain previously made her lose her appetite. Pain now controled.  Last BM prior to admit. Does not report HA, lightheadedness, SOB, CP, abdominal pain, or changes in numbness/tingling/weakness.      Social Hx:  Patient lives alone in an apt, 2nd story. Had a daughter in [place]  and a son in [place], daughter can help intermittently, has elevator access   0 ALBERT  At prior level of function patient was Independent with mobility and ADLs.      Tobacco: Denies   Alcohol: Denies   Drugs: Denies      THERAPY:  Restrictions: Fall Risk, WBAT RLE   PT: Functional mobility   8/30 CGA with FWW x 16 ft, CGA sit to stand, CGA transfers      OT: ADLs  8/30 Mod A bed mobility, Max A lower body dressing      SLP:   None      IMAGING:  DX-FEMUR-2+ RIGHT   Final Result           1.  Proximal femoral diaphyseal periprosthetic fracture.       DX-PELVIS-1 OR 2 VIEWS   Final Result           1.  No acute traumatic bony injury.       DX-CHEST-PORTABLE (1 VIEW)   Final Result           1.  No acute  "cardiopulmonary disease.   2.  Atherosclerosis   3.  Perihilar interstitial prominence and bronchial wall cuffing, appearance suggests changes of underlying bronchial inflammation, consider bronchitis.       CT-CSPINE WITHOUT PLUS RECONS   Final Result           1.  Multilevel degenerative changes of the cervical spine limit diagnostic sensitivity of this examination, otherwise no acute traumatic bony injury of the cervical spine is apparent.   2.  Atherosclerosis       CT-HEAD W/O   Final Result           1.  No acute intracranial abnormality is identified, there are nonspecific white matter changes, commonly associated with small vessel ischemic disease.  Associated mild cerebral atrophy is noted.   2.  Atherosclerosis.             PROCEDURES:  8/29 ORIF of the right proximal femur periprosthetic fracture by Dr. Galvan      Allergies:        Allergies   Allergen Reactions    Ampicillin Unspecified       Hazy, bloated    Keflex [Cephalexin] Unspecified       Drowsy, altered         Physical Exam:  Vitals: /48   Pulse 93   Temp 36.4 °C (97.6 °F) (Temporal)   Resp 15   Ht 1.651 m (5' 5\")   Wt 59 kg (130 lb)   SpO2 93%   Gen: NAD, laying comfortably in bed   Head:  NC/AT  Eyes/ Nose/ Mouth: PERRLA, moist mucous membranes  Cardio: RRR, good distal perfusion, warm extremities  Pulm: normal respiratory effort, no cyanosis, on RA   Abd: Soft NTND, negative borborygmi   Ext: No peripheral edema. No calf tenderness. No clubbing.     Mental status:  A&Ox4 (person, place, date, situation) answers questions appropriately follows commands  Speech: fluent, no aphasia or dysarthria     CRANIAL NERVES:  2,3: visual acuity grossly intact, PERRL  3,4,6: EOMI bilaterally, no nystagmus or diplopia  5: sensation intact to light touch bilaterally and symmetric  7: no facial asymmetry  8: hearing grossly intact        Motor:                            Upper Extremity  Myotome R L   Shoulder flexion C5 5/5 5/5   Elbow flexion " C5 5/5 5/5   Wrist extension C6 5/5 5/5   Elbow extension C7 5/5 5/5   Finger flexion C8 5/5 5/5   Finger abduction T1 5/5 5/5      Lower Extremity Myotome R L   Hip flexion L2 2, limited by pain /5 5/5   Knee extension L3 3/5 5/5   Ankle dorsiflexion L4 5/5 5/5   Toe extension L5 5/5 5/5   Ankle plantarflexion S1 5/5 5/5         Sensory:   intact to light touch through out     DTRs: 2+ in bilateral  biceps  No clonus at bilateral ankles  Negative Pritchard b/l      Tone: no spasticity noted, no cogwheeling noted     Coordination:   intact finger to nose bilaterally  intact fine motor with fingers bilaterally        Labs: Reviewed and significant for        Recent Labs     08/29/23  0001 08/30/23  0838   RBC 4.10* 3.11*   HEMOGLOBIN 12.1 9.3*   HEMATOCRIT 36.7* 29.0*   PLATELETCT 239 228           Recent Labs     08/29/23  0001 08/30/23  0838   SODIUM 140 137   POTASSIUM 3.2* 5.4   CHLORIDE 108 106   CO2 20 22   GLUCOSE 172* 189*   BUN 17 18   CREATININE 0.61 0.95   CALCIUM 7.7* 7.8*      Recent Results         Recent Results (from the past 24 hour(s))   Basic Metabolic Panel     Collection Time: 08/30/23  8:38 AM   Result Value Ref Range     Sodium 137 135 - 145 mmol/L     Potassium 5.4 3.6 - 5.5 mmol/L     Chloride 106 96 - 112 mmol/L     Co2 22 20 - 33 mmol/L     Glucose 189 (H) 65 - 99 mg/dL     Bun 18 8 - 22 mg/dL     Creatinine 0.95 0.50 - 1.40 mg/dL     Calcium 7.8 (L) 8.5 - 10.5 mg/dL     Anion Gap 9.0 7.0 - 16.0   CBC WITH DIFFERENTIAL     Collection Time: 08/30/23  8:38 AM   Result Value Ref Range     WBC 9.4 4.8 - 10.8 K/uL     RBC 3.11 (L) 4.20 - 5.40 M/uL     Hemoglobin 9.3 (L) 12.0 - 16.0 g/dL     Hematocrit 29.0 (L) 37.0 - 47.0 %     MCV 93.2 81.4 - 97.8 fL     MCH 29.9 27.0 - 33.0 pg     MCHC 32.1 (L) 32.2 - 35.5 g/dL     RDW 45.5 35.9 - 50.0 fL     Platelet Count 228 164 - 446 K/uL     MPV 10.3 9.0 - 12.9 fL     Neutrophils-Polys 77.00 (H) 44.00 - 72.00 %     Lymphocytes 12.90 (L) 22.00 - 41.00 %      Monocytes 9.40 0.00 - 13.40 %     Eosinophils 0.00 0.00 - 6.90 %     Basophils 0.10 0.00 - 1.80 %     Immature Granulocytes 0.60 0.00 - 0.90 %     Nucleated RBC 0.00 0.00 - 0.20 /100 WBC     Neutrophils (Absolute) 7.21 1.82 - 7.42 K/uL     Lymphs (Absolute) 1.21 1.00 - 4.80 K/uL     Monos (Absolute) 0.88 (H) 0.00 - 0.85 K/uL     Eos (Absolute) 0.00 0.00 - 0.51 K/uL     Baso (Absolute) 0.01 0.00 - 0.12 K/uL     Immature Granulocytes (abs) 0.06 0.00 - 0.11 K/uL     NRBC (Absolute) 0.00 K/uL   ESTIMATED GFR     Collection Time: 08/30/23  8:38 AM   Result Value Ref Range     GFR (CKD-EPI) 60 >60 mL/min/1.73 m 2               ASSESSMENT:  Patient is a 78 y.o. female admitted with R hip fracture      [facility] Code / Diagnosis to Support: 0008.11 - Orthopaedic Disorders: Status Post Unilateral Hip Fracture     Rehabilitation: Impaired ADLs and mobility  Patient is a good candidate for inpatient rehab based on needs for PT, OT,see dispo details below.      Barriers to transfer include: Insurance authorization, TCCs to verify disposition, medical clearance and bed availability      Additional Recommendations:  R hip fracture   - sustained during a GLF at home   - images showed a proximal femoral periprosthetic fracture   - s/p  8/29  ORIF of the the proximal femoral periprosthetic fracture by Dr. Galvan   - WBAT RLE   - continue with PT/OT      ABLA   - post op drop in Hgb   - 8/30 Hgb 9.3      Depression   - history of depression   - not currently on home meds  - was previously on adderral?      Constipation  - no BM since prior to admit   - continue with scheduled bowel meds  - changed miralax to daily   - recommend suppository tonight if no BM today      Hyperglycemia   - BG up to 189   - on SSI      Dispo:  - patient is currently functioning below their level of baseline, recommend post acute rehab  - recommend IRF level therapy with 3hr of therapy 5 days per week  - piror to acceptance to IRF, will need confirmation of DC  support   - TCC to assist with insurance auth and DC support            Medical Complexity:  R hip fracture   ABLA   Depression   Hyperglycemia   Impaired mobility and ADLs         DVT PPX: Lovenox         Thank you for allowing us to participate in the care of this patient.      Patient was seen for 65 minutes on unit/floor of which > 50% of time was spent on counseling and coordination of care regarding the above, including prognosis, risk reduction, benefits of treatment, and options for next stage of care.     Iesha Carrillo D.O.   Physical Medicine and Rehabilitation      Please note that this dictation was created using voice recognition software. I have made every reasonable attempt to correct obvious errors, but there may be errors of grammar and possibly content that I did not discover before finalizing the note.         8/29/2023     Time Called: 0500  Time Arrived: 1300        HPI: Barbra العراقي is a 78 y.o. female who presents with right hip pain after ground-level fall.  She denies any other injury sustained in the fall.  She was seen in the ED where x-rays revealed a right proximal femur periprosthetic fracture.  She reports her hip replacement was done in [place] back in 2006.  She has not had any issues with that until the fall.  She has been unable to ambulate since her injury.  Of note she has self-reported urinary tract infection and dysuria.  She has what appears to be history of left periprosthetic femur fracture as well although she is unaware exactly what they did.  She ambulates with a cane at baseline.     Past Medical History   History reviewed. No pertinent past medical history.        Past Surgical History   History reviewed. No pertinent surgical history.        Medications  No current facility-administered medications on file prior to encounter.             Current Outpatient Medications on File Prior to Encounter   Medication Sig Dispense Refill    amphetamine-dextroamphetamine ER  "(ADDERALL XR, 30MG,) 30 MG XR capsule Take 30 mg by mouth every morning.        diphenhydrAMINE (BENADRYL) 25 MG Tab Take 25 mg by mouth 1 time a day as needed for Sleep.        Cyanocobalamin (B-12 PO) Take 1 Tablet by mouth every day.        Calcium Carbonate (CALCIUM 600 PO) Take 1 Tablet by mouth every day.        Multiple Vitamins-Minerals (ZINC PO) Take 1 Tablet by mouth every day.        Cholecalciferol (D3 PO) Take 1 Tablet by mouth every day.        ibuprofen (MOTRIN) 200 MG Tab Take 400 mg by mouth every 6 hours as needed.        Zinc 50 MG Tab Take 50 mg by mouth every day.             Allergies  Ampicillin and Keflex [cephalexin]     ROS  . All other systems were reviewed and found to be negative     Family History   History reviewed. No pertinent family history.        Social History              Socioeconomic History    Marital status:             Physical Exam  Vitals  /63   Pulse 74   Temp 36.3 °C (97.4 °F) (Temporal)   Resp 20   Ht 1.651 m (5' 5\")   Wt 59 kg (130 lb)   SpO2 93%   General: Well Developed, Well Nourished, Age appropriate appearance  HEENT: Normocephalic, atraumatic  Psych: Normal mood and affect  Neck: Supple, nontender, no masses  Lungs: Breathing unlabored, No audible wheezing  Heart: Regular heart rate and rhythm  Abdomen: Soft, NT, ND  Neuro: Sensation grossly intact to BUE and BLE, moving all four extremities  Skin: Intact, no open wounds  Vascular: , Capillary refill <2 seconds  MSK: Right lower extremity: No obvious shortening or rotational deformity noted.  Sensation intact distally to deep peroneal superficial peroneal tibial nerves.  Ankle dorsiflexion plantarflexion intact.        Radiographs:  DX-FEMUR-2+ RIGHT   Final Result           1.  Proximal femoral diaphyseal periprosthetic fracture.       DX-PELVIS-1 OR 2 VIEWS   Final Result           1.  No acute traumatic bony injury.       DX-CHEST-PORTABLE (1 VIEW)   Final Result           1.  No acute " cardiopulmonary disease.   2.  Atherosclerosis   3.  Perihilar interstitial prominence and bronchial wall cuffing, appearance suggests changes of underlying bronchial inflammation, consider bronchitis.       CT-CSPINE WITHOUT PLUS RECONS   Final Result           1.  Multilevel degenerative changes of the cervical spine limit diagnostic sensitivity of this examination, otherwise no acute traumatic bony injury of the cervical spine is apparent.   2.  Atherosclerosis       CT-HEAD W/O   Final Result           1.  No acute intracranial abnormality is identified, there are nonspecific white matter changes, commonly associated with small vessel ischemic disease.  Associated mild cerebral atrophy is noted.   2.  Atherosclerosis.           DX-PORTABLE FLUORO > 1 HOUR    (Results Pending)   DX-FEMUR-2+ RIGHT    (Results Pending)         Laboratory Values      Recent Labs     08/29/23  0001   WBC 11.4*   RBC 4.10*   HEMOGLOBIN 12.1   HEMATOCRIT 36.7*   MCV 89.5   MCH 29.5   MCHC 33.0   RDW 42.7   PLATELETCT 239   MPV 10.0          Recent Labs     08/29/23  0001   SODIUM 140   POTASSIUM 3.2*   CHLORIDE 108   CO2 20   GLUCOSE 172*   BUN 17                Impression: 78-year-old female ground-level fall with right periprosthetic proximal femur fracture around what appears to be a well-seated stable press-fit femoral stem.  We discussed options for management including surgical fixation versus revision arthroplasty.  We discussed based on the preoperative x-rays likely plan on open reduction internal fixation although will be ready for revision arthroplasty if needed if the stem is in fact loose intraoperatively.       Plan:We discussed the diagnosis and findings with the patient at length.  We reviewed possible non operative and operative interventions and the risks and benefits of each of these.  she had a chance to ask questions and all of these were answered to her satisfaction. The patient chose to proceed with   surgical  intervention. Risks and benefits of surgery were discussed which include but are not limited to bleeding, infection, neurovascular damage, malunion, nonunion, instability, limb length discrepancy, DVT, PE, MI, Stroke and death. They understand these risks and wish to proceed.        Giorgio Galvan MD  Orthopedic Trauma Surgery         DATE OF OPERATION: 8/29/2023     PREOPERATIVE DIAGNOSIS: Waves B1 periprosthetic proximal femur fracture around the well-seated press-fit femoral stem     POSTOPERATIVE DIAGNOSIS: Same     PROCEDURE PERFORMED: Right proximal femur periprosthetic fracture open reduction internal fixation     SURGEON: Giorgio Galvan M.D.      ASSISTANT: Sammy Newman MD, fellow     ANESTHESIA: General     SPECIMEN: None     ESTIMATED BLOOD LOSS: 50  mL     IMPLANTS: [place] 4.5 mm broad plate, cerclage cables x3        INDICATIONS: The patient is a 78 y.o. female who presented with right proximal femur periprosthetic fracture.  X-rays revealed what appeared to be a well-seated stem with metaphyseal fit.  There is no obvious subsidence noted..  I discussed the risks and benefits of the procedure which include but are not limited to risks of infection, wound healing complication, neurovascular injury, pain, malunion, non-union, malrotation, and the medical risks of anesthesia including MI, stroke, and death.  Alternatives to surgery were also discussed, including non-operative management, which I did not recommend.  The patient was in agreement with the plan to proceed, and the informed consent was signed and documented.  I met with the patient pre-operatively and marked the operative extremity with their agreement.  We proceeded to the operating room.      DESCRIPTION OF PROCEDURE:  Patient was seen in the preoperative holding area on the day of surgery. The operative site was marked with my initials.  she was taken to the operating room and placed lateral on the operative table.  Anesthesia was  induced.  The operative extremity was prepped and draped in the normal sterile fashion.  Operative pause was conducted and the correct patient, site, side, procedure, and surgeon's initials on extremity were identified.  The posterior scar was reincised and extended distally past the level of the fracture.  Fascia split in line with skin incision.  A subvastus approach was then performed using Lopez elevator to release the vastus.  Fracture site was identified cleaned of hematoma.  The stem was at this time checked and noted to be stable without any obvious rotation instability.  Decision was made at this point time to proceed with open reduction internal fixation rather than revision arthroplasty.  Using some axial traction and rotation the fracture was reduced and held provisionally with spent down lobster clamps.  This was checked on AP and lateral views noted be anatomic.  We then placed 2 cerclage cables provisionally holding the fracture.  Clamps were then removed.  We then placed a long 4.5 mm broad lateral plate that was contoured to fit the vastus flare.  Once appropriate contour was obtained placed the plate in position this was checked on AP and lateral views noted to be in good position.  We then drilled and placed several nonlocking screws allow for compression of the plate down to bone.  Next we placed a single cerclage wire around the distal aspect of the stem to allow for additional fixation of the plate to the bone.  We then drilled several periprosthetic locking screws.  We were able to place a single proximal bicortical screw around the implant to again allow for good fixation.  Finally #we felt we had appropriate screw spread and fixation the final images were obtained indicating appropriate reduction implant position.  The wound was then thoroughly irrigated sterile saline.  Vancomycin tobramycin powder was placed in the wound.  Wound was then closed in layered fashion with #1 strata fix for  "fascia.  This followed by 0 Vicryl 2-0 Vicryl staples for skin.  Incisional wound VAC was then placed.  The patient was awoken taken to PACU stable condition.     POSTOPERATIVE PLAN: Weightbearing as tolerated right lower extremity weight bearing.  Mobilize with physical and occupational therapies.  DVT prophylaxis with SCDs and Lovenox until mobilizing independently and then can be switched to aspirin for 4 weeks.  The patient will follow up in clinic in 2 weeks to check wounds and remove sutures/staples.        ____________________________________   Giorgio Galvan M.D.   DD: 8/29/2023  7:13 PM     Co-morbidities:  See PMH  Potential Risk - Complications: Contractures, Pain, Pneumonia, and Pressure Ulcer  Level of Risk: High    Ongoing Medical Management Needed (Medical/Nursing Needs):   Patient Active Problem List    Diagnosis Date Noted    Closed right hip fracture, initial encounter ([facility]) 08/29/2023    Hypokalemia 08/29/2023    Depression 08/29/2023    Leukocytosis 08/29/2023     A/o  Current Vital Signs:   Temperature: 36.4 °C (97.6 °F) Pulse: 93 Respiration: 15 Blood Pressure : 106/48  Weight: 59 kg (130 lb) Height: 165.1 cm (5' 5\")  Pulse Oximetry: 93 % O2 (LPM): 0      Completed Laboratory Reports:  Recent Labs     08/29/23  0001 08/30/23  0838   WBC 11.4* 9.4   HEMOGLOBIN 12.1 9.3*   HEMATOCRIT 36.7* 29.0*   PLATELETCT 239 228   SODIUM 140 137   POTASSIUM 3.2* 5.4   BUN 17 18   CREATININE 0.61 0.95   ALBUMIN 3.4  --    GLUCOSE 172* 189*     Additional Labs: Not Applicable    Prior Living Situation:   Housing / Facility: 1 Story Apartment / Condo (2nd floor apartment)  Steps Into Home: 0  Steps In Home: 0  Lives with - Patient's Self Care Capacity: Alone and Able to Care For Self  Equipment Owned: Single Point Cane, Front-Wheel Walker    Prior Level of Function / Living Situation:   Physical Therapy: Prior Services: None  Housing / Facility: 1 Story Apartment / Condo (2nd floor apartment)  Steps Into " Home: 0  Steps In Home: 0  Elevator: Yes  Bathroom Set up: Bathtub / Shower Combination  Equipment Owned: Single Point Cane, Front-Wheel Walker  Lives with - Patient's Self Care Capacity: Alone and Able to Care For Self  Bed Mobility: Independent  Transfer Status: Independent  Ambulation: Independent  Assistive Devices Used: Single Point Cane  Current Level of Function:   Gait Level Of Assist: Contact Guard Assist  Assistive Device: Front Wheel Walker  Distance (Feet): 16  Deviation: Bradykinetic, Decreased Base Of Support, Step To, Antalgic, Other (Comment) (Reduced step & stride length)  Weight Bearing Status: Patient able to maintain WBAT on RLE  Supine to Sit: Minimal Assist (HOB raised, use of bed rail)  Scooting: Moderate Assist  Comments: Required cues for appropriate technique & sequencing of LE  Sit to Stand: Contact Guard Assist  Bed, Chair, Wheelchair Transfer: Contact Guard Assist  Transfer Method: Stand Step     Occupational Therapy:   Self Feeding: Independent  Grooming / Hygiene: Independent  Bathing: Independent  Dressing: Independent  Toileting: Independent  Medication Management: Independent  Laundry: Independent  Kitchen Mobility: Independent  Finances: Independent  Home Management: Independent  Shopping: Independent  Prior Level Of Mobility: Independent With Device in Community, Independent With Device in Home  Driving / Transportation: Utilizes Public Transportation, Walks  Prior Services: None  Housing / Facility: 1 Story Apartment / Condo (2nd floor apartment)  Occupation (Pre-Hospital Vocational): Retired Due To Age  Current Level of Function:   Lower Body Dressing: Maximal Assist  Toileting:  (declined need)  Speech Language Pathology:      Rehabilitation Prognosis/Potential: Good  Estimated Length of Stay: 10-14 days    Nursing:      Continent    Scope/Intensity of Services Recommended:  Physical Therapy: 1.5 hr / day  5 days / week. Therapeutic Interventions Required: Maximize Endurance,  Mobility, Strength, and Safety  Occupational Therapy: 1.5 hr / day 5 days / week. Therapeutic Interventions Required: Maximize Self Care, ADLs, IADLs, and Energy Conservation  Rehabilitation Nursin/7. Therapeutic Interventions Required: Monitor Pain, Skin, Vital Signs, Intake and Output, Labs, Safety, and Family Training  Rehabilitation Physician: 3 - 5 days / week. Therapeutic Interventions Required: Medical Management    She requires 24-hour rehabilitation nursing to manage bowel and bladder function, skin care, surgical incision, nutrition and fluid intake, pain control, safety, medication management, and patient/family goals. In addition, rehabilitation nursing will reiterate and reinforce therapy skills and equipment use, including ADLs, as well as provide education to the patient and family. Barbra العراقي is willing to participate in and is able to tolerate the proposed plan of care.    Rehabilitation Goals and Plan (Expected frequency & duration of treatment in the IRF):   Return to the Community and Modified Independent Level of Care  Anticipated Date of Rehabilitation Admission: 23  Patient/Family oriented IRF level of care/facility/plan: Yes  Patient/Family willing to participate in IRF care/facility/plan: Yes  Patient able to tolerate IRF level of care proposed: Yes  Patient has potential to benefit IRF level of care proposed: Yes  Comments: Not Applicable    Special Needs or Precautions - Medical Necessity:  Safety Concerns/Precautions:  Fall Risk / High Risk for Falls and Balance  Pain Management  IV Site: Peripheral  Current Medications:    Current Facility-Administered Medications Ordered in Epic   Medication Dose Route Frequency Provider Last Rate Last Admin    ammonium lactate (Lac-Hydrin) 12 % lotion   Topical BID Perlita Fang M.D.   Given at 23 1345    senna-docusate (Pericolace Or Senokot S) 8.6-50 MG per tablet 2 Tablet  2 Tablet Oral BID Iesha Carrillo D.O.        And     polyethylene glycol/lytes (Miralax) PACKET 1 Packet  1 Packet Oral DAILY Iesha Carrillo D.O.        And    magnesium hydroxide (Milk Of Magnesia) suspension 30 mL  30 mL Oral QDAY PRN Iesha Carrillo D.O.        And    bisacodyl (Dulcolax) suppository 10 mg  10 mg Rectal QDAY PRN Iesha Carrillo D.O.        melatonin tablet 5 mg  5 mg Oral Nightly Girish Mejia M.D.   5 mg at 08/29/23 2211    lactated ringers infusion   Intravenous Continuous Girish Mejia M.D. 83 mL/hr at 08/29/23 2210 New Bag at 08/29/23 2210    Pharmacy Consult Request ...Pain Management Review 1 Each  1 Each Other PHARMACY TO DOSE Geraldene ALEAH Patela-Llshadiuno, A.P.R.N.        ibuprofen (Motrin) tablet 800 mg  800 mg Oral TID Geraldene ALEAH Patela-Henryo, A.P.R.N.   800 mg at 08/30/23 1345    Followed by    [START ON 9/3/2023] ibuprofen (Motrin) tablet 800 mg  800 mg Oral TID PRN Geraldene ALEAH Ralleca-Llaguno, A.P.R.N.        oxyCODONE immediate-release (Roxicodone) tablet 2.5 mg  2.5 mg Oral Q3HRS PRN Geraldene R Ralleca-Llaguno, A.P.R.N.        Or    oxyCODONE immediate-release (Roxicodone) tablet 5 mg  5 mg Oral Q3HRS PRN Geraldene ALEAH Shileca-Llaguno, A.P.R.N.   5 mg at 08/30/23 0906    Or    morphine 4 MG/ML injection 2 mg  2 mg Intravenous Q3HRS PRN Geraldene ALEAH Ralleca-Llaguno, A.P.R.N.        Pharmacy Consult Request ...Pain Management Review 1 Each  1 Each Other PHARMACY TO DOSE Giorgio Galvan M.D.        enoxaparin (Lovenox) inj 30 mg  30 mg Subcutaneous Q12HRS Giorgio Galvan M.D.   30 mg at 08/30/23 0906    acetaminophen (Tylenol) tablet 1,000 mg  1,000 mg Oral Q6HRS Giorgio Galvan M.D.   1,000 mg at 08/30/23 1344    Followed by    [START ON 9/4/2023] acetaminophen (Tylenol) tablet 1,000 mg  1,000 mg Oral Q6HRS PRN Giorgio Galvan M.D.         No current Epic-ordered outpatient medications on file.     Diet:   DIET ORDERS (From admission to next 24h)       Start     Ordered    08/29/23 2133  Diet Order Diet: Regular   ALL MEALS        Question:  Diet:  Answer:  Regular    08/29/23 2132                    Anticipated Discharge Destination / Patient/Family Goal:  Destination: Home Alone Support System: Family   Anticipated home health services: OT, PT, and Nursing  Previously used  service/ provider: Not Applicable  Anticipated DME Needs: [facility] [employer]  Outpatient Services: OT and PT  Alternative resources to address additional identified needs:   No future appointments.   Pre-Screen Completed: 8/30/2023 2:52 PM Aidee Yee

## 2023-08-30 NOTE — PROGRESS NOTES
"Ortho Progress Note    Subjective:  S/p 8/29/23: ORIF right proximal femur periprosthetic fracture    Pain controlled. Worked with PT this AM. Hgb 9.3    ROS - Patient denies any new issues. No chest pain, dyspnea, or fever.  Pain well controlled.    /44   Pulse 97   Temp 36.4 °C (97.6 °F) (Temporal)   Resp 15   Ht 1.651 m (5' 5\")   Wt 59 kg (130 lb)   SpO2 97%     Patient seen and examined  No acute distress  Breathing non labored  RRR  RLE: Provena WV in place. Motor and sensory exam intact. Cap refill brisk    Recent Labs     08/29/23  0001 08/30/23  0838   WBC 11.4* 9.4   RBC 4.10* 3.11*   HEMOGLOBIN 12.1 9.3*   HEMATOCRIT 36.7* 29.0*   MCV 89.5 93.2   MCH 29.5 29.9   MCHC 33.0 32.1*   RDW 42.7 45.5   PLATELETCT 239 228   MPV 10.0 10.3       Active Hospital Problems    Diagnosis     Closed right hip fracture, initial encounter ([facility]) [S72.001A]     Hypokalemia [E87.6]     Depression [F32.A]     Leukocytosis [D72.829]        Assessment/Plan:      Right proximal femur periprosthetic fracture  S/p 8/29/23: ORIF right proximal femur periprosthetic fracture    Wt bearing status - WBAT  PT/OT-initiated  Provena WV on until outpatient follow up  Sutures/Staples out- 10-14 days post operatively  Antibiotics: checo-op abx complete  DVT Prophylaxis-  SCDs and Lovenox until mobilizing independently and then can be switched to aspirin for 4 weeks    Future Procedures - none  Case Coordination for Discharge Planning - Disposition: pending PT, dispo planning      Sammy Newman MD  Ortho Trauma Fellow   [phone] or voalte    "

## 2023-08-30 NOTE — DISCHARGE PLANNING
Care Transition Team Assessment    Information Source  Orientation Level: Oriented X4  Information Given By: Patient  Informant's Name: Barbra العراقي  Who is responsible for making decisions for patient? : Patient    Readmission Evaluation  Is this a readmission?: No    Elopement Risk  Legal Hold: No  Ambulatory or Self Mobile in Wheelchair: Yes  Disoriented: No  Psychiatric Symptoms: None  History of Wandering: No  Elopement this Admit: No  Vocalizing Wanting to Leave: No  Displays Behaviors, Body Language Wanting to Leave: No-Not at Risk for Elopement  Elopement Risk: Not at Risk for Elopement    Interdisciplinary Discharge Planning  Does Admitting Nurse Feel This Could be a Complex Discharge?: No  Primary Care Physician: Kylie Blancas  Lives with - Patient's Self Care Capacity: Alone and Able to Care For Self  Support Systems: Children  Housing / Facility: 1 Story Apartment / Condo (2nd floor apartment)  Do You Take your Prescribed Medications Regularly: Yes  Able to Return to Previous ADL's: Future Time w/Therapy  Mobility Issues: Yes  Prior Services: None  Patient Prefers to be Discharged to:: Skilled Nursing  Assistance Needed: Yes  Durable Medical Equipment: Other - Specify (single prong cane)    Discharge Preparedness  What is your plan after discharge?: Skilled nursing facility  What are your discharge supports?: Child  Prior Functional Level: Independent with Activities of Daily Living, Ambulatory  Difficulity with ADLs: Walking    Functional Assesment  Prior Functional Level: Independent with Activities of Daily Living, Ambulatory    Finances  Financial Barriers to Discharge: No  Prescription Coverage: Yes (Uses [facility] on [address])       Advance Directive  Advance Directive?: None (Per the patient her son Clark العراقي [phone])  Advance Directive offered?: AD Booklet refused    Domestic Abuse  Have you ever been the victim of abuse or violence?: No    Psychological Assessment  History of  Substance Abuse: None  History of Psychiatric Problems: Yes  Non-compliant with Treatment: No (Depression - patient is compliant with her treatment.)     Anticipated Discharge Information  Discharge Disposition: D/T to SNF with Medicare cert in anticipation of skilled care (03)  Discharge Address: [facility]  Discharge Contact Phone Number: TBD    Patient's chart reviewed and the patient was discussed in rounds. A blanket referral was sent by the DPA.   CM met with the patient at  bedside to discuss discharge planning.   The patient expressed interest  in [facility].   Patient has a concern about her cat being left alone.   CM will look into [facility] tis available to assist with the care of her pet.       CM received notice from [facility] that the patient has been accepted to their facility. Patient is to be transported to [facility] via GMT between 17- 17:30 today.

## 2023-08-30 NOTE — DISCHARGE SUMMARY
Discharge Summary    CHIEF COMPLAINT ON ADMISSION  Chief Complaint   Patient presents with    T-5000 GLF       Reason for Admission  TBI    Admission Date  8/28/2023     CODE STATUS  Full Code    HPI & HOSPITAL COURSE    Ms. Barbra العراقي is a pleasant 78-year-old woman with little known medical history other than depression for which she is prescribed Adderall who presented 8/28/2023 with ground-level fall.     She was walking out of her bathroom and tripped and fell onto her right hip and felt immediate pain.  She has a history of bilateral hip surgeries with hardware in place.  X-ray showed proximal femoral diaphyseal periprosthetic fracture.  Ortho was consulted, Dr. Galvan, and patient underwent Right proximal femur periprosthetic fracture open reduction internal fixation on 8/29. She has done well post operatively.   She was seen by therapy and post acute care was recommended. She was accepted to rehab and will transfer for ongoing therapy needs.   Patient is anemic post operative but has no signs of bleeding, this will need to be monitored.     She is hemodynamically stable to discharge, will need outpatient ortho follow up .     Therefore, she is discharged in fair and stable condition to an inpatient rehabilitation hospital.    The patient met 2-midnight criteria for an inpatient stay at the time of discharge.      FOLLOW UP ITEMS POST DISCHARGE  Anemia   Ortho follow up     DISCHARGE DIAGNOSES  Principal Problem:    Closed right hip fracture, initial encounter ([facility]) (POA: Yes)  Active Problems:    Hypokalemia (POA: Unknown)    Depression (POA: Unknown)    Leukocytosis (POA: Unknown)  Resolved Problems:    * No resolved hospital problems. *      FOLLOW UP  No future appointments.  No follow-up provider specified.    MEDICATIONS ON DISCHARGE     Medication List        START taking these medications        Instructions   acetaminophen 500 MG Tabs  Commonly known as: Tylenol   Take 2 Tablets by mouth every 6  hours.  Dose: 1,000 mg     enoxaparin 40 MG/0.4ML Sosy inj  Commonly known as: Lovenox   Inject 40 mg under the skin every day.  Dose: 40 mg     melatonin 5 mg Tabs   Take 1 Tablet by mouth every evening.  Dose: 5 mg     oxyCODONE immediate-release 5 MG Tabs  Commonly known as: Roxicodone   Take 0.5-1 Tablets by mouth every 3 hours as needed for Severe Pain for up to 7 days.  Dose: 2.5-5 mg     polyethylene glycol/lytes 17 g Pack  Commonly known as: Miralax   Take 1 Packet by mouth every day.  Dose: 17 g     senna-docusate 8.6-50 MG Tabs  Commonly known as: Pericolace Or Senokot S   Take 2 Tablets by mouth 2 times a day.  Dose: 2 Tablet            CHANGE how you take these medications        Instructions   ibuprofen 800 MG Tabs  What changed:   medication strength  how much to take  when to take this  reasons to take this  Commonly known as: Motrin   Take 1 Tablet by mouth 3 times a day.  Dose: 800 mg            CONTINUE taking these medications        Instructions   ADDERALL XR (30MG) 30 MG XR capsule  Generic drug: amphetamine-dextroamphetamine ER   Take 30 mg by mouth every morning.  Dose: 30 mg     B-12 PO   Take 1 Tablet by mouth every day.  Dose: 1 Tablet     CALCIUM 600 PO   Take 1 Tablet by mouth every day.  Dose: 1 Tablet     D3 PO   Take 1 Tablet by mouth every day.  Dose: 1 Tablet     Zinc 50 MG Tabs   Take 50 mg by mouth every day.  Dose: 50 mg     ZINC PO   Take 1 Tablet by mouth every day.  Dose: 1 Tablet            STOP taking these medications      diphenhydrAMINE 25 MG Tabs  Commonly known as: Benadryl              Allergies  Allergies   Allergen Reactions    Ampicillin Unspecified     Hazy, bloated    Keflex [Cephalexin] Unspecified     Drowsy, altered       DIET  Orders Placed This Encounter   Procedures    Diet Order Diet: Regular     Standing Status:   Standing     Number of Occurrences:   1     Order Specific Question:   Diet:     Answer:   Regular [1]       ACTIVITY  As tolerated.  Weight  bearing as tolerated    LINES, DRAINS, AND WOUNDS  This is an automated list. Peripheral IVs will be removed prior to discharge.  Peripheral IV 08/28/23 20 G Right Forearm (Active)   Site Assessment Clean;Dry;Intact 08/30/23 0859   Dressing Type Transparent 08/30/23 0859   Line Status Saline locked 08/30/23 0859   Dressing Status Clean;Dry;Intact 08/30/23 0859   Dressing Intervention Dressing changed per protocol 08/29/23 2100   Dressing Change Due 09/02/23 08/29/23 1920   Date Primary Tubing Changed 08/29/23 08/29/23 1920   NEXT Primary Tubing Change  09/02/23 08/29/23 1920   Date IV Connector(s) Changed 08/29/23 08/29/23 1920   Infiltration Grading ([facility], [facility]) 0 08/30/23 0859   Phlebitis Scale ([facility] Only) 0 08/30/23 0859       Peripheral IV 08/29/23 22 G Anterior;Left Forearm (Active)   Site Assessment Clean;Dry;Intact 08/30/23 0859   Dressing Type Transparent 08/30/23 0859   Line Status Infusing 08/30/23 0859   Dressing Status Clean;Dry;Intact 08/30/23 0859   Dressing Intervention Initial dressing 08/29/23 2200   Infiltration Grading ([facility], [facility]) 0 08/30/23 0859       Wound 08/29/23 Incision Leg Right [place], Prevena (Active)   Site Assessment GUERITA 08/30/23 0859   Periwound Assessment GUERITA 08/30/23 0859   Margins GUERITA 08/30/23 0859   Closure GUERITA 08/30/23 0859   Drainage Amount GUERITA 08/30/23 0859   Dressing Status Clean;Dry;Intact 08/30/23 0859   Dressing Changed Observed 08/30/23 0859   Dressing Options Wound Manager 08/30/23 0859       Peripheral IV 08/28/23 20 G Right Forearm (Active)   Site Assessment Clean;Dry;Intact 08/30/23 0859   Dressing Type Transparent 08/30/23 0859   Line Status Saline locked 08/30/23 0859   Dressing Status Clean;Dry;Intact 08/30/23 0859   Dressing Intervention Dressing changed per protocol 08/29/23 2100   Dressing Change Due 09/02/23 08/29/23 1920   Date Primary Tubing Changed 08/29/23 08/29/23 1920   NEXT Primary Tubing Change  09/02/23 08/29/23 1920   Date IV Connector(s)  Changed 08/29/23 08/29/23 1920   Infiltration Grading ([facility], [facility]) 0 08/30/23 0859   [facility] 0 08/30/23 0859       Peripheral IV 08/29/23 22 G Anterior;Left Forearm (Active)   Site Assessment Clean;Dry;Intact 08/30/23 0859   Dressing Type Transparent 08/30/23 0859   Line Status Infusing 08/30/23 0859   Dressing Status Clean;Dry;Intact 08/30/23 0859   Dressing Intervention Initial dressing 08/29/23 2200   Infiltration Grading ([facility]) 0 08/30/23 0859               MENTAL STATUS ON TRANSFER      Alert and oriented x 3       CONSULTATIONS  [facility] - [facility]   Physiatry     PROCEDURES  8/29/2023  PROCEDURE PERFORMED: Right proximal femur periprosthetic fracture open reduction internal fixation    LABORATORY  Lab Results   Component Value Date    SODIUM 137 08/30/2023    POTASSIUM 5.4 08/30/2023    CHLORIDE 106 08/30/2023    CO2 22 08/30/2023    GLUCOSE 189 (H) 08/30/2023    BUN 18 08/30/2023    CREATININE 0.95 08/30/2023        Lab Results   Component Value Date    WBC 9.4 08/30/2023    HEMOGLOBIN 9.3 (L) 08/30/2023    HEMATOCRIT 29.0 (L) 08/30/2023    PLATELETCT 228 08/30/2023        Total time of the discharge process exceeds 45 minutes.

## 2023-08-30 NOTE — ADDENDUM NOTE
Addendum  created 08/29/23 1949 by Tobey Gansert, M.D.    Order list changed, Pharmacy for encounter modified

## 2023-08-30 NOTE — DISCHARGE PLANNING
1507: Per physiatry patient is a candidate for IPR. Attempted to contact daughter (April [phone]) 2x to verify dc support, no vm set up. Per attending patient is cleared for rehab, forwarded PAS to Dr Jain to review.     1600: Patient has been accepted by Dr Jain at [facility]. Transport set for 17/1730 GMT w/c, nurse report [id number]. Notified care team.     1630: Met with patient at bedside and provided update, discussed IPR and helped patient call daughter April who will be visiting this evening. Received choice from patient and provided to cm.

## 2023-08-30 NOTE — WOUND TEAM
[facility]  Inpatient Services  Wound and Skin Care Brief Evaluation    Admission Date: 8/28/2023     Last order of IP CONSULT TO WOUND CARE was found on 8/29/2023 from Hospital Encounter on 8/28/2023     HPI, PMH, SH: Reviewed    Chief Complaint   Patient presents with    T-5000 GLF     Diagnosis: Closed right hip fracture, initial encounter ([facility]) [S72.001A]    Unit where seen by Wound Team: T303/02     Wound consult placed regarding BL elbows, BL feet, and sacrum. Chart and images reviewed. This discussed with bedside RN. This clinician in to assess patient. Patient pleasant and agreeable, currently sitting in chair. BL elbows with small skin tears that are beginning to resolve. R foot with callus along the plantar aspect of the 1st MTH. Pt also with moderate xerosis to bilateral feet that she applies a topical ointment to. Will order Amlactin. Sacrum with mild redness that was blanching on patient.     No pressure injuries or advanced wound care needs identified. Wound consult completed. No further follow up unless indicated and consulted.          PREVENTATIVE INTERVENTIONS:    Q shift Esc[place] - performed per nursing policy  Q shift pressure point assessments - performed per nursing policy    Surface/Positioning  Standard/trauma mattress - Currently in Place  Reposition q 2 hours - Currently in Place  Waffle cushion - Currently in Place  Waffle overlay  - Currently in Place    Offloading/Redistribution  Sacral offloading dressing (Silicone dressing) - Ordered  Heel offloading dressing (Silicone dressing) - Ordered      Mobilization      Up to chair and Ambulate

## 2023-08-30 NOTE — DISCHARGE PLANNING
[facility] Transitional Care Coordination    Referral from: Dr Galvan  Insurance Provider on Facesheet: Medicare/Medicaid  Potential Rehab Diagnosis: Hip fx    Chart review indicates patient may have on going medical management and may have therapy needs to possibly meet inpatient rehab facility criteria with the goal of returning to community.    D/C support: TBD     Physiatry consultation pended per protocol.     Physiatry consult pended, awaiting post-op therapy evals as appropriate. TCC will follow.     Thank you for the referral.

## 2023-08-30 NOTE — H&P
Physical Medicine & Rehabilitation  History and Physical (H&P)  &     Post Admission Physician Evaluation (SHANAE)       Date of Admission: 08/30/23  Date of Service: 8/31/2023   Barbra العراقي  [id number]    [facility] Code to Support Admission: 0008.11 - Orthopaedic Disorders: Status Post Unilateral Hip Fracture  Etiologic Diagnosis: Closed right hip fracture, initial encounter (HCC)    _______________________________________________    Chief Complaint: Doing well, would like Adderall    History of Present Illness:  Adapted from the PM&R Consult by Dr. Carrillo:   HPI: The patient is a 78 y.o.  female with a past medical history of depression;  who presented on 8/28/2023 11:28 PM with R hip pain after GLF. Per documentation, patient had been walking out of her bathroom and tripped and fell on her right hip. Upon eval in the ED, images showed a proximal femoral diaphyseal periprosthetic fracture. Ortho was consulted, and patient was taken to the OR on 8/29 for ORIF of the R proximal femur periprosthetic fracture performed y Dr. Galvan. Post op, patient is WBAT RLE. Her hospital course has weston notable for post op ABLA and hyperglycemia. Patient has been able to participate with therapies, she is MIN A  for mobility.     On admission patient reports that she is glad to be here.  Her daughter will be able to help out intermittently.  Her daughter does not actively work at this time.  She does have an elevator to her second floor.  She is concerned about getting her Adderall.  Informed her we do not have that here and she is okay with alternate right now.    Review of Systems:     14 point ROS reviewed and negative except as stated above.      Past Medical History:  No past medical history on file.    Past Surgical History:  Past Surgical History:   Procedure Laterality Date    ORIF, FRACTURE, FEMUR Right 8/29/2023    Procedure: ORIF, FRACTURE, FEMUR;  Surgeon: Giorgio Galvan M.D.;  Location: SURGERY [facility];  Service:  Orthopedics       Family History:  No family history on file.    Medications:  Current Facility-Administered Medications   Medication Dose    Respiratory Therapy Consult      Pharmacy Consult Request ...Pain Management Review 1 Each  1 Each    omeprazole (PriLOSEC) capsule 20 mg  20 mg    hydrALAZINE (Apresoline) tablet 25 mg  25 mg    carboxymethylcellulose (Refresh Tears) 0.5 % ophthalmic drops 1 Drop  1 Drop    benzocaine-menthol (Cepacol) lozenge 1 Lozenge  1 Lozenge    mag hydrox-al hydrox-simeth (Maalox Plus Es Or Mylanta Ds) suspension 20 mL  20 mL    ondansetron (Zofran ODT) dispertab 4 mg  4 mg    Or    ondansetron (Zofran) syringe/vial injection 4 mg  4 mg    traZODone (Desyrel) tablet 50 mg  50 mg    sodium chloride (Ocean) 0.65 % nasal spray 2 Spray  2 Spray    acetaminophen (Tylenol) tablet 500 mg  500 mg    ammonium lactate (Lac-Hydrin) 12 % lotion      enoxaparin (Lovenox) inj 30 mg  30 mg    senna-docusate (Pericolace Or Senokot S) 8.6-50 MG per tablet 2 Tablet  2 Tablet    And    polyethylene glycol/lytes (Miralax) PACKET 1 Packet  1 Packet    And    magnesium hydroxide (Milk Of Magnesia) suspension 30 mL  30 mL    And    bisacodyl (Dulcolax) suppository 10 mg  10 mg    oxyCODONE immediate-release (Roxicodone) tablet 5 mg  5 mg    Or    oxyCODONE immediate release (Roxicodone) tablet 10 mg  10 mg    melatonin tablet 4.5 mg  4.5 mg     Allergies:  Ampicillin and Keflex [cephalexin]    Psychosocial History:  Living Site:  Home  Living With:  self  Caregiver's availability:   Intermittent  Number of stairs:   0 - has elevator to second floor    Level of Function Prior to Disability:  Housing / Facility: 1 Story Apartment / Condo (2nd floor apartment)  Steps Into Home: 0  Steps In Home: 0  Elevator: Yes  Bathroom Set up: Bathtub / Shower Combination  Equipment Owned: Single Point Cane, Front-Wheel Walker  Lives with - Patient's Self Care Capacity: Alone and Able to Care For Self  Bed Mobility:  "Independent  Transfer Status: Independent  Ambulation: Independent  Assistive Devices Used: Single Point Cane    Self Feeding: Independent  Grooming / Hygiene: Independent  Bathing: Independent  Dressing: Independent  Toileting: Independent  Medication Management: Independent  Laundry: Independent  Kitchen Mobility: Independent  Finances: Independent  Home Management: Independent  Shopping: Independent  Prior Level Of Mobility: Independent With Device in Community, Independent With Device in Home  Driving / Transportation: Utilizes Public Transportation, Walks  Prior Services: None  Housing / Facility: 1 Story Apartment / Condo (2nd floor apartment)  Occupation (Pre-Hospital Vocational): Retired Due To Age    Level of Function Prior to Admission to [facility]:  Gait Level Of Assist: Contact Guard Assist  Assistive Device: Front Wheel Walker  Distance (Feet): 16  Deviation: Bradykinetic, Decreased Base Of Support, Step To, Antalgic, Other (Comment) (Reduced step & stride length)  Weight Bearing Status: Patient able to maintain WBAT on RLE  Supine to Sit: Minimal Assist (HOB raised, use of bed rail)  Scooting: Moderate Assist  Comments: Required cues for appropriate technique & sequencing of LE  Sit to Stand: Contact Guard Assist  Bed, Chair, Wheelchair Transfer: Contact Guard Assist  Transfer Method: Stand Step    Lower Body Dressing: Maximal Assist  Toileting:  (declined need)    CURRENT LEVEL OF FUNCTION:   Same as level of function prior to admission to [facility]    Physical Examination:     VITAL SIGNS:   height is 1.651 m (5' 5\") and weight is 61.7 kg (136 lb). Her oral temperature is 37.1 °C (98.8 °F). Her blood pressure is 96/62 and her pulse is 66. Her respiration is 16 and oxygen saturation is 94%.   GENERAL: No apparent distress  HEENT: Normocephalic/atraumatic and EOMI  CARDIAC: Regular rate and rhythm  LUNGS: Clear to auscultation   ABDOMINAL: soft and nontender  "   EXTREMITIES: no spasticity or no edema  NEURO:  Mental status:  A&Ox4 (person, place, date, situation) answers questions appropriately follows commands  Speech: fluent, no aphasia or dysarthria    Motor Exam Upper Extremities   ? Myotome R L   Shoulder Abduction C5 5 5   Elbow flexion C5 5 5   Wrist extension C6 5 5   Elbow extension C7 5 5   Finger flexion C8 5 5   Finger abduction T1 5 5     Motor Exam Lower Extremities  ? Myotome R L   Hip flexion L2 5 5   Knee extension L3 5 5   Ankle dorsiflexion L4 5 5   Toe extension L5 5 5   Ankle plantarflexion S1 5 5       Radiology:  DX-FEMUR-2+ RIGHT   Final Result           1.  Proximal femoral diaphyseal periprosthetic fracture.       DX-PELVIS-1 OR 2 VIEWS   Final Result           1.  No acute traumatic bony injury.       DX-CHEST-PORTABLE (1 VIEW)   Final Result           1.  No acute cardiopulmonary disease.   2.  Atherosclerosis   3.  Perihilar interstitial prominence and bronchial wall cuffing, appearance suggests changes of underlying bronchial inflammation, consider bronchitis.       CT-CSPINE WITHOUT PLUS RECONS   Final Result           1.  Multilevel degenerative changes of the cervical spine limit diagnostic sensitivity of this examination, otherwise no acute traumatic bony injury of the cervical spine is apparent.   2.  Atherosclerosis       CT-HEAD W/O   Final Result           1.  No acute intracranial abnormality is identified, there are nonspecific white matter changes, commonly associated with small vessel ischemic disease.  Associated mild cerebral atrophy is noted.   2.  Atherosclerosis.               Laboratory Values:  Recent Labs     08/29/23  0001 08/30/23  0838 08/31/23  0523   SODIUM 140 137 139   POTASSIUM 3.2* 5.4 4.5   CHLORIDE 108 106 106   CO2 20 22 25   GLUCOSE 172* 189* 103*   BUN 17 18 19   CREATININE 0.61 0.95 0.79   CALCIUM 7.7* 7.8* 8.0*     Recent Labs     08/29/23  0001 08/30/23  0838 08/31/23  0523   WBC 11.4* 9.4 9.0   RBC 4.10*  3.11* 3.01*   HEMOGLOBIN 12.1 9.3* 8.8*   HEMATOCRIT 36.7* 29.0* 28.2*   MCV 89.5 93.2 93.7   MCH 29.5 29.9 29.2   MCHC 33.0 32.1* 31.2*   RDW 42.7 45.5 46.4   PLATELETCT 239 228 218   MPV 10.0 10.3 10.3           Primary Rehabilitation Diagnosis:    This patient is a 78 y.o. female admitted for acute inpatient rehabilitation with   Closed right hip fracture, initial encounter ([facility]).    Impairments:   ADLs/IADLs  Mobility    Secondary Diagnosis/Medical Co-morbidities Affecting Function   ABLA, hyperglycemia, post-operative pain, hypokalemia     Relevant Changes Since Preadmission Evaluation:    Status unchanged    The patient's rehabilitation potential is Excellent  The patient's medical prognosis is excellent    Rehabilitation Plan:   Discussion and Recommendations:   1. The patient requires an acute inpatient rehabilitation program with a coordinated program of care at an intensity and frequency not available at a lower level of care. This recommendation is substantiated by the patient's medical physicians who recommend that the patient's intervention and assessment of medical issues needs to be done at an acute level of care for patient's safety and maximum outcome.   2. A coordinated program of care will be supplied by an interdisciplinary team of physical therapy, occupational therapy, rehab physician, rehab nursing, and, if needed, speech therapy and rehab psychology. Rehab team presents a patient-specific rehabilitation and education program concentrating on prevention of future problems related to accessibility, mobility, skin, bowel, bladder, sexuality, and psychosocial and medical/surgical problems.   3. Need for Rehabilitation Physician: The rehab physician will be evaluating the patient on a multi-weekly basis to help coordinate the program of care. The rehab physician communicates between medical physicians, therapists, and nurses to maximize the patient's potential outcome. Specific areas in which the  rehab physician will be providing daily assessment include the following:   A. Assessing the patient's heart rate and blood pressure response (vitals monitoring) to activity and making adjustments in medications or conservative measures as needed.   B. The rehab physician will be assessing the frequency at which the program can be increased to allow the patient to reach optimal functional outcome.   C. The rehab physician will also provide assessments in daily skin care, especially in light of patient's impairments in mobility.   D. The rehab physician will provide special expertise in understanding how to work with functional impairment and recommend appropriate interventions, compensatory techniques, and education that will facilitate the patient's outcome.   4. Rehab R.N.   The rehab RN will be working with patient to carry over in room mobility and activities of daily living when the patient is not in 3 hours of skilled therapy. Rehab nursing will be working in conjunction with rehab physician to address all the medical issues above and continue to assess laboratory work and discuss abnormalities with the treating physicians, assess vitals, and response to activity, and discuss and report abnormalities with the rehab physician. Rehab RN will also continue daily skin care, supervise bladder/bowel program, instruct in medication administration, and ensure patient safety.   5. Rehab Therapy: Therapies to treat at intensity and frequency of (may change after completion of evaluation by all therapeutic disciplines):       PT:  Physical therapy to address mobility, transfer, gait training and evaluation for adaptive equipment needs 1.5 hour/day at least 5 days/week for the duration of the ELOS (see below)       OT:  Occupational therapy to address ADLs, self-care, home management training, functional mobility/transfers and assistive device evaluation, and community re-integration 1.5 hour/day at least 5 days/week for  the duration of the ELOS (see below).        ST/Dysphagia:  Speech therapy to address speech, language, and cognitive deficits as well as swallowing difficulties with retraining/dysphagia management and community re-integration with comprehension, expression, cognitive training 0 hour/day at least 5 days/week for the duration of the ELOS (see below).     Medical management / Rehabilitation Issues/ Adverse Potential as part of rehabilitation plan     Rehabilitation Issues/Adverse Potential  1.  Right checo-prosthetic hip fracture:  Patient demonstrates functional deficits in strength, balance, coordination, and ADL's. Patient is admitted to [facility] for comprehensive rehabilitation therapy as described below.   Rehabilitation nursing monitors bowel and bladder control, educates on medication administration, co-morbidities and monitors patient safety.    2.  Neurostimulants: None at this time but continue to assess daily for need to initiate should status change.    3.  DVT prophylaxis:  Patient is on Lovenox for anticoagulation upon transfer. Encourage OOB. Monitor daily for signs and symptoms of DVT including but not limited to swelling and pain to prevent the development of DVT that may interfere with therapies.    4.  GI prophylaxis:  On prilosec to prevent gastritis/dyspepsia which may interfere with therapies.    5.  Pain: Controlled with Oxycodone and tylenol    6.  Nutrition/Dysphagia: Dietician monitors nutrient intake, recommend supplements prn and provide nutrition education to pt/family to promote optimal nutrition for wound healing/recovery.     7.  Bladder/bowel:  Start bowel and bladder program as described below, to prevent constipation, urinary retention (which may lead to UTI), and urinary incontinence (which will impact upon pt's functional independence).   - TV Q3h while awake with post void bladder scans, I&O cath for PVRs >400  - up to commode after meal     8.  Skin/dermal  ulcer prophylaxis: Monitor for new skin conditions with q.2 h. turns as required to prevent the development of skin breakdown.     9.  Cognition/Behavior: As needed psychologist provides adjustment counseling to illness and psychosocial barriers that may be potential barriers to rehabilitation.     10. Respiratory therapy: RT performs O2 management prn, breathing retraining, pulmonary hygiene and bronchospasm management prn to optimize participation in therapies.     Medical Co-Morbidities/Adverse Potential Affecting Function:    Right Hip Per-Prosthetic Fracture s/p ORIF Dr. Galvan 8/29/23  PT and OT for mobility and ADLs. Per guidelines, 15 hours per week between PT, OT and/or SLP.  Follow-up Ortho  WBAT RLE  [place] on until outpatient follow up  Sutures out 10-14 days post-op    Pain  PRN Tylenol + Oxycodone    ABLA  Hgb drop 9.3-->8.8  Monitor    Depression  No anti-depressant at home    Hyperglycemia  Order Hgb A1c    ADHD   Add home Adderall XR 30mg daily when patient dtr brings.   Ritalin 10mg qAM for now    Skin  Patient at risk for skin breakdown due to debility in areas including sacrum, achilles, elbows and head in addition to other sites. Nursing to assess skin daily.     GI Ppx  Patient on Prilosec for GERD prophylaxis.     Sleep  Melatonin    Bowel   Patient on Senna-docusate for constipation prophylaxis.   Last BM: 08/30/23    Bladder  IDFC - remove if no contraindication    DVT PROPHYLAXIS: Lovenox 30mg SQ q12hrs on admission --> 40mg SQ daily  Switch to ASA 80mg nusrat on D/c per Ortho    HOSPITALIST FOLLOWING: No    CODE STATUS: FULL CODE    DISPO: Lives alone in 1 story apartment on second floor. Elevator present. Daughter can help intermittently.     I personally performed a complete drug regimen review and no potential clinically significant medication issues were identified.     Goals/Expected Level of Function Based on Current Medical and Functional Status:  (may change based on patient's  medical status and rate of impairment recovery)  Transfers:   Modified Independent  Mobility/Gait:   Modified Independent  ADL's:   Modified Independent    DISPOSITION: Discharge to pre-morbid independent living setting with the supportive care of patient's self with intermittent help from daughter.    ELOS: 7-14 days  ____________________________________    Dr. Fauzia Jain DO, MS  [facility] - Physical Medicine & Rehabilitation   ____________________________________    Pt was seen today for 75 min, and entire time spent in face-to-face contact was >50% in counseling and coordination of care as detailed in A/P above.

## 2023-08-30 NOTE — ANESTHESIA TIME REPORT
Anesthesia Start and Stop Event Times     Date Time Event    8/29/2023 1650 Ready for Procedure     1705 Anesthesia Start     1922 Anesthesia Stop        Responsible Staff  08/29/23    Name Role Begin End    Tobey Gansert, M.D. Anesth 1705 1922        Overtime Reason:  no overtime (within assigned shift)    Comments:

## 2023-08-30 NOTE — CONSULTS
Physical Medicine and Rehabilitation Consultation              Date of initial consultation: 8/30/2023  Requesting provider: ordered by Giorgio Galvan M.D. at 08/30/23 1353   Consulting provider: Iesha Carrillo D.O.  Reason for consultation: assess for acute inpatient rehab appropriateness  LOS: 1 Day(s)    Chief complaint: R hip pain after a GLF     HPI: The patient is a 78 y.o.  female with a past medical history of depression;  who presented on 8/28/2023 11:28 PM with R hip pain after GLF. Per documentation, patient ad been walking out of her bathroom and tripped and fell on her right hip. Upon eval in the ED, images showed a proximal femoral diaphyseal periprosthetic fracture. Ortho was consulted, and patient was taken to the OR on 8/29 for ORIF of the R proximal femur periprosthetic fracture performed y Dr. Galvan. Post op, patient is WBAT RLE. Her hospital course has weston notable for post op ABLA and hyperglycemia. Patient has been able to participate with therapies, she is MIN A  for mobility.     Patient seen and examined at bedside, reports she feels ok. Reports she finally has an appetite. Has not been able to eat until now, reports the pain previously made her lose her appetite. Pain now controled.  Last BM prior to admit. Does not report HA, lightheadedness, SOB, CP, abdominal pain, or changes in numbness/tingling/weakness.     Social Hx:  Patient lives alone in an apt, 2nd story. Had a daughter in [place]  and a son in [place], daughter can help intermittently, has elevator access   0 ALBERT  At prior level of function patient was Independent with mobility and ADLs.     Tobacco: Denies   Alcohol: Denies   Drugs: Denies     THERAPY:  Restrictions: Fall Risk, WBAT RLE   PT: Functional mobility   8/30 CGA with FWW x 16 ft, CGA sit to stand, CGA transfers     OT: ADLs  8/30 Mod A bed mobility, Max A lower body dressing     SLP:   None     IMAGING:  DX-FEMUR-2+ RIGHT   Final Result           1.  Proximal  femoral diaphyseal periprosthetic fracture.       DX-PELVIS-1 OR 2 VIEWS   Final Result           1.  No acute traumatic bony injury.       DX-CHEST-PORTABLE (1 VIEW)   Final Result           1.  No acute cardiopulmonary disease.   2.  Atherosclerosis   3.  Perihilar interstitial prominence and bronchial wall cuffing, appearance suggests changes of underlying bronchial inflammation, consider bronchitis.       CT-CSPINE WITHOUT PLUS RECONS   Final Result           1.  Multilevel degenerative changes of the cervical spine limit diagnostic sensitivity of this examination, otherwise no acute traumatic bony injury of the cervical spine is apparent.   2.  Atherosclerosis       CT-HEAD W/O   Final Result           1.  No acute intracranial abnormality is identified, there are nonspecific white matter changes, commonly associated with small vessel ischemic disease.  Associated mild cerebral atrophy is noted.   2.  Atherosclerosis.           PROCEDURES:  8/29 ORIF of the right proximal femur periprosthetic fracture by Dr. Galvan     PMH:  History reviewed. No pertinent past medical history.    PSH:  Past Surgical History:   Procedure Laterality Date    ORIF, FRACTURE, FEMUR Right 8/29/2023    Procedure: ORIF, FRACTURE, FEMUR;  Surgeon: Giorgio Galvan M.D.;  Location: SURGERY [facility];  Service: Orthopedics       FHX:  History reviewed. No pertinent family history.    Medications:  Current Facility-Administered Medications   Medication Dose    ammonium lactate (Lac-Hydrin) 12 % lotion      melatonin tablet 5 mg  5 mg    senna-docusate (Pericolace Or Senokot S) 8.6-50 MG per tablet 2 Tablet  2 Tablet    And    polyethylene glycol/lytes (Miralax) PACKET 1 Packet  1 Packet    And    magnesium hydroxide (Milk Of Magnesia) suspension 30 mL  30 mL    And    bisacodyl (Dulcolax) suppository 10 mg  10 mg    lactated ringers infusion      Pharmacy Consult Request ...Pain Management Review 1 Each  1 Each    ibuprofen (Motrin)  "tablet 800 mg  800 mg    Followed by    [START ON 9/3/2023] ibuprofen (Motrin) tablet 800 mg  800 mg    oxyCODONE immediate-release (Roxicodone) tablet 2.5 mg  2.5 mg    Or    oxyCODONE immediate-release (Roxicodone) tablet 5 mg  5 mg    Or    morphine 4 MG/ML injection 2 mg  2 mg    Pharmacy Consult Request ...Pain Management Review 1 Each  1 Each    enoxaparin (Lovenox) inj 30 mg  30 mg    acetaminophen (Tylenol) tablet 1,000 mg  1,000 mg    Followed by    [START ON 9/4/2023] acetaminophen (Tylenol) tablet 1,000 mg  1,000 mg       Allergies:  Allergies   Allergen Reactions    Ampicillin Unspecified     Hazy, bloated    Keflex [Cephalexin] Unspecified     Drowsy, altered       Physical Exam:  Vitals: /48   Pulse 93   Temp 36.4 °C (97.6 °F) (Temporal)   Resp 15   Ht 1.651 m (5' 5\")   Wt 59 kg (130 lb)   SpO2 93%   Gen: NAD, laying comfortably in bed   Head:  NC/AT  Eyes/ Nose/ Mouth: PERRLA, moist mucous membranes  Cardio: RRR, good distal perfusion, warm extremities  Pulm: normal respiratory effort, no cyanosis, on RA   Abd: Soft NTND, negative borborygmi   Ext: No peripheral edema. No calf tenderness. No clubbing.    Mental status:  A&Ox4 (person, place, date, situation) answers questions appropriately follows commands  Speech: fluent, no aphasia or dysarthria    CRANIAL NERVES:  2,3: visual acuity grossly intact, PERRL  3,4,6: EOMI bilaterally, no nystagmus or diplopia  5: sensation intact to light touch bilaterally and symmetric  7: no facial asymmetry  8: hearing grossly intact      Motor:      Upper Extremity  Myotome R L   Shoulder flexion C5 5/5 5/5   Elbow flexion C5 5/5 5/5   Wrist extension C6 5/5 5/5   Elbow extension C7 5/5 5/5   Finger flexion C8 5/5 5/5   Finger abduction T1 5/5 5/5     Lower Extremity Myotome R L   Hip flexion L2 2, limited by pain /5 5/5   Knee extension L3 3/5 5/5   Ankle dorsiflexion L4 5/5 5/5   Toe extension L5 5/5 5/5   Ankle plantarflexion S1 5/5 5/5       Sensory: "   intact to light touch through out    DTRs: 2+ in bilateral  biceps  No clonus at bilateral ankles  Negative Pritchard b/l     Tone: no spasticity noted, no cogwheeling noted    Coordination:   intact finger to nose bilaterally  intact fine motor with fingers bilaterally      Labs: Reviewed and significant for   Recent Labs     08/29/23  0001 08/30/23 0838   RBC 4.10* 3.11*   HEMOGLOBIN 12.1 9.3*   HEMATOCRIT 36.7* 29.0*   PLATELETCT 239 228     Recent Labs     08/29/23  0001 08/30/23 0838   SODIUM 140 137   POTASSIUM 3.2* 5.4   CHLORIDE 108 106   CO2 20 22   GLUCOSE 172* 189*   BUN 17 18   CREATININE 0.61 0.95   CALCIUM 7.7* 7.8*     Recent Results (from the past 24 hour(s))   Basic Metabolic Panel    Collection Time: 08/30/23  8:38 AM   Result Value Ref Range    Sodium 137 135 - 145 mmol/L    Potassium 5.4 3.6 - 5.5 mmol/L    Chloride 106 96 - 112 mmol/L    Co2 22 20 - 33 mmol/L    Glucose 189 (H) 65 - 99 mg/dL    Bun 18 8 - 22 mg/dL    Creatinine 0.95 0.50 - 1.40 mg/dL    Calcium 7.8 (L) 8.5 - 10.5 mg/dL    Anion Gap 9.0 7.0 - 16.0   CBC WITH DIFFERENTIAL    Collection Time: 08/30/23  8:38 AM   Result Value Ref Range    WBC 9.4 4.8 - 10.8 K/uL    RBC 3.11 (L) 4.20 - 5.40 M/uL    Hemoglobin 9.3 (L) 12.0 - 16.0 g/dL    Hematocrit 29.0 (L) 37.0 - 47.0 %    MCV 93.2 81.4 - 97.8 fL    MCH 29.9 27.0 - 33.0 pg    MCHC 32.1 (L) 32.2 - 35.5 g/dL    RDW 45.5 35.9 - 50.0 fL    Platelet Count 228 164 - 446 K/uL    MPV 10.3 9.0 - 12.9 fL    Neutrophils-Polys 77.00 (H) 44.00 - 72.00 %    Lymphocytes 12.90 (L) 22.00 - 41.00 %    Monocytes 9.40 0.00 - 13.40 %    Eosinophils 0.00 0.00 - 6.90 %    Basophils 0.10 0.00 - 1.80 %    Immature Granulocytes 0.60 0.00 - 0.90 %    Nucleated RBC 0.00 0.00 - 0.20 /100 WBC    Neutrophils (Absolute) 7.21 1.82 - 7.42 K/uL    Lymphs (Absolute) 1.21 1.00 - 4.80 K/uL    Monos (Absolute) 0.88 (H) 0.00 - 0.85 K/uL    Eos (Absolute) 0.00 0.00 - 0.51 K/uL    Baso (Absolute) 0.01 0.00 - 0.12 K/uL     Immature Granulocytes (abs) 0.06 0.00 - 0.11 K/uL    NRBC (Absolute) 0.00 K/uL   ESTIMATED GFR    Collection Time: 08/30/23  8:38 AM   Result Value Ref Range    GFR (CKD-EPI) 60 >60 mL/min/1.73 m 2         ASSESSMENT:  Patient is a 78 y.o. female admitted with R hip fracture     [facility] Code / Diagnosis to Support: 0008.11 - Orthopaedic Disorders: Status Post Unilateral Hip Fracture    Rehabilitation: Impaired ADLs and mobility  Patient is a good candidate for inpatient rehab based on needs for PT, OT,see dispo details below.     Barriers to transfer include: Insurance authorization, TCCs to verify disposition, medical clearance and bed availability     Additional Recommendations:  R hip fracture   - sustained during a GLF at home   - images showed a proximal femoral periprosthetic fracture   - s/p  8/29  ORIF of the the proximal femoral periprosthetic fracture by Dr. Galvan   - WBAT RLE   - continue with PT/OT     ABLA   - post op drop in Hgb   - 8/30 Hgb 9.3     Depression   - history of depression   - not currently on home meds  - was previously on adderral?     Constipation  - no BM since prior to admit   - continue with scheduled bowel meds  - changed miralax to daily   - recommend suppository tonight if no BM today     Hyperglycemia   - BG up to 189   - on SSI     Dispo:  - patient is currently functioning below their level of baseline, recommend post acute rehab  - recommend IRF level therapy with 3hr of therapy 5 days per week  - piror to acceptance to IRF, will need confirmation of DC support   - TCC to assist with insurance auth and DC support         Medical Complexity:  R hip fracture   ABLA   Depression   Hyperglycemia   Impaired mobility and ADLs       DVT PPX: Lovenox       Thank you for allowing us to participate in the care of this patient.     Patient was seen for 65 minutes on unit/floor of which > 50% of time was spent on counseling and coordination of care regarding the above, including prognosis,  risk reduction, benefits of treatment, and options for next stage of care.    Iesha Carrillo D.O.   Physical Medicine and Rehabilitation     Please note that this dictation was created using voice recognition software. I have made every reasonable attempt to correct obvious errors, but there may be errors of grammar and possibly content that I did not discover before finalizing the note.

## 2023-08-30 NOTE — THERAPY
"Occupational Therapy   Initial Evaluation     Patient Name: Barbra العراقي  Age:  78 y.o., Sex:  female  Medical Record #: [id number]  Today's Date: 8/30/2023     Precautions  Precautions: Fall Risk, Weight Bearing As Tolerated Right Lower Extremity  Comments: prevena vac to R incision    Assessment  Patient is 78 y.o. female admitted after GLF with a diagnosis of R proximal femur periprosthetic fx s/p ORIF.  Additional factors influencing patient status / progress: weakness, fatigue, impaired balance, pain.      Plan    Occupational Therapy Initial Treatment Plan   Treatment Interventions: Self Care / Activities of Daily Living, Adaptive Equipment, Neuro Re-Education / Balance, Therapeutic Exercises, Therapeutic Activity  Treatment Frequency: 4 Times per Week  Duration: Until Therapy Goals Met    DC Equipment Recommendations: Unable to determine at this time  Discharge Recommendations: Recommend post-acute placement for additional occupational therapy services prior to discharge home     Subjective    \"I'm really nervous to get up.\"     Objective       08/30/23 0956   Prior Living Situation   Prior Services Home-Independent   Housing / Facility 2 Story Apartment / Condo   Elevator Yes   Bathroom Set up Bathtub / Shower Combination   Equipment Owned Single Point Cane;Front-Wheel Walker   Lives with - Patient's Self Care Capacity Alone and Able to Care For Self   Comments Pt reports that she has a dtr that is local but not very supportive. She has a cat that is currently being taken care of by the humane society. Not much social support to speak of otherwise.   Prior Level of ADL Function   Self Feeding Independent   Grooming / Hygiene Independent   Bathing Independent   Dressing Independent   Toileting Independent   Prior Level of IADL Function   Medication Management Independent   Laundry Independent   Kitchen Mobility Independent   Finances Independent   Home Management Independent   Shopping Independent   Prior Level " Of Mobility Independent With Device in Community;Independent With Device in Home   Driving / Transportation Utilizes Public Transportation;Walks   Occupation (Pre-Hospital Vocational) Retired Due To Age   History of Falls   History of Falls Yes   Precautions   Precautions Fall Risk;Weight Bearing As Tolerated Right Lower Extremity   Comments prevena vac to R incision   Pain 0 - 10 Group   Therapist Pain Assessment Nurse Notified;During Activity  (premedicated, min c/o pain in RLE)   Cognition    Cognition / Consciousness WDL   Level of Consciousness Alert   Comments pleasant and receptive   Active ROM Upper Body   Active ROM Upper Body  WDL   Strength Upper Body   Upper Body Strength  WDL   Balance Assessment   Sitting Balance (Static) Fair +   Sitting Balance (Dynamic) Fair   Standing Balance (Static) Fair -   Standing Balance (Dynamic) Poor +   Weight Shift Sitting Fair   Weight Shift Standing Poor   Comments w/ FWW   Bed Mobility    Supine to Sit Moderate Assist   Scooting Moderate Assist   ADL Assessment   Grooming Supervision;Seated  (brush hair, wash face, oral care)   Lower Body Dressing Maximal Assist   Toileting   (declined need)   How much help from another person does the patient currently need...   Putting on and taking off regular lower body clothing? 2   Bathing (including washing, rinsing, and drying)? 2   Toileting, which includes using a toilet, bedpan, or urinal? 2   Putting on and taking off regular upper body clothing? 3   Taking care of personal grooming such as brushing teeth? 3   Eating meals? 3   6 Clicks Daily Activity Score 15   Functional Mobility   Sit to Stand Minimal Assist   Bed, Chair, Wheelchair Transfer Minimal Assist   Mobility EOB>amb to bathroom door>Chair   Comments w/ FWW   Patient / Family Goals   Patient / Family Goal #1 go home   Short Term Goals   Short Term Goal # 1 pt will demo ADL txfs with supv   Short Term Goal # 2 pt will dress LB with supv and AE prn   Short Term  Goal # 3 pt will demo toileting with supv   Short Term Goal # 4 pt will demo grooming at the sink w/ supv

## 2023-08-30 NOTE — ANESTHESIA POSTPROCEDURE EVALUATION
Patient: Barbra العراقي    Procedure Summary     Date: 08/29/23 Room / Location: [place] [address] / SURGERY [facility]    Anesthesia Start: 1705 Anesthesia Stop: 1922    Procedure: ORIF, FRACTURE, FEMUR (Right: Leg Upper) Diagnosis: (right femur fracture)    Surgeons: Giorgio Galvan M.D. Responsible Provider: Tobey Gansert, M.D.    Anesthesia Type: general ASA Status: 2          Final Anesthesia Type: general  Last vitals  BP   Blood Pressure : (!) 184/77    Temp   36.1 °C (97 °F)    Pulse   75   Resp   16    SpO2   100 %      Anesthesia Post Evaluation    Patient location during evaluation: PACU  Patient participation: complete - patient participated  Level of consciousness: awake and alert    Airway patency: patent  Anesthetic complications: no  Cardiovascular status: hemodynamically stable  Respiratory status: acceptable  Hydration status: euvolemic    PONV: none          No notable events documented.     Nurse Pain Score: 3 (NPRS)

## 2023-08-30 NOTE — CARE PLAN
The patient is Stable - Low risk of patient condition declining or worsening      Problem: Knowledge Deficit - Standard  Goal: Patient and family/care givers will demonstrate understanding of plan of care, disease process/condition, diagnostic tests and medications  Outcome: Progressing  Patient updated on POC. All questions answered.      Problem: Communication  Goal: The ability to communicate needs accurately and effectively will improve  Outcome: Progressing  Pt communicates needs effectively.   Call light w/in reach.

## 2023-08-30 NOTE — CARE PLAN
The patient is Stable - Low risk of patient condition declining or worsening    Shift Goals  Clinical Goals: manage pain, safety  Patient Goals: rest    Progress made toward(s) clinical / shift goals:  Pt rested comfortably in bed.  She required no more analgesics than scheduled analgesics.       Problem: Pain - Standard  Goal: Alleviation of pain or a reduction in pain to the patient’s comfort goal  Outcome: Progressing     Problem: Knowledge Deficit - Standard  Goal: Patient and family/care givers will demonstrate understanding of plan of care, disease process/condition, diagnostic tests and medications  Outcome: Progressing     Problem: Fall Risk  Goal: Patient will remain free from falls  Outcome: Progressing       Patient is not progressing towards the following goals:

## 2023-08-31 ENCOUNTER — APPOINTMENT (OUTPATIENT)
Dept: PHYSICAL THERAPY | Facility: REHABILITATION | Age: 78
DRG: 560 | End: 2023-08-31
Attending: PHYSICAL MEDICINE & REHABILITATION
Payer: MEDICARE

## 2023-08-31 ENCOUNTER — APPOINTMENT (OUTPATIENT)
Dept: OCCUPATIONAL THERAPY | Facility: REHABILITATION | Age: 78
DRG: 560 | End: 2023-08-31
Attending: PHYSICAL MEDICINE & REHABILITATION
Payer: MEDICARE

## 2023-08-31 LAB
ALBUMIN SERPL BCP-MCNC: 3 G/DL (ref 3.2–4.9)
ALBUMIN/GLOB SERPL: 1.3 G/DL
ALP SERPL-CCNC: 80 U/L (ref 30–99)
ALT SERPL-CCNC: 10 U/L (ref 2–50)
ANION GAP SERPL CALC-SCNC: 8 MMOL/L (ref 7–16)
AST SERPL-CCNC: 25 U/L (ref 12–45)
BASOPHILS # BLD AUTO: 0.6 % (ref 0–1.8)
BASOPHILS # BLD: 0.05 K/UL (ref 0–0.12)
BILIRUB SERPL-MCNC: 0.2 MG/DL (ref 0.1–1.5)
BUN SERPL-MCNC: 19 MG/DL (ref 8–22)
CALCIUM ALBUM COR SERPL-MCNC: 8.8 MG/DL (ref 8.5–10.5)
CALCIUM SERPL-MCNC: 8 MG/DL (ref 8.5–10.5)
CHLORIDE SERPL-SCNC: 106 MMOL/L (ref 96–112)
CO2 SERPL-SCNC: 25 MMOL/L (ref 20–33)
CREAT SERPL-MCNC: 0.79 MG/DL (ref 0.5–1.4)
EOSINOPHIL # BLD AUTO: 0.07 K/UL (ref 0–0.51)
EOSINOPHIL NFR BLD: 0.8 % (ref 0–6.9)
ERYTHROCYTE [DISTWIDTH] IN BLOOD BY AUTOMATED COUNT: 46.4 FL (ref 35.9–50)
GFR SERPLBLD CREATININE-BSD FMLA CKD-EPI: 75 ML/MIN/1.73 M 2
GLOBULIN SER CALC-MCNC: 2.3 G/DL (ref 1.9–3.5)
GLUCOSE SERPL-MCNC: 103 MG/DL (ref 65–99)
HCT VFR BLD AUTO: 28.2 % (ref 37–47)
HGB BLD-MCNC: 8.8 G/DL (ref 12–16)
IMM GRANULOCYTES # BLD AUTO: 0.17 K/UL (ref 0–0.11)
IMM GRANULOCYTES NFR BLD AUTO: 1.9 % (ref 0–0.9)
LYMPHOCYTES # BLD AUTO: 1.71 K/UL (ref 1–4.8)
LYMPHOCYTES NFR BLD: 18.9 % (ref 22–41)
MAGNESIUM SERPL-MCNC: 2 MG/DL (ref 1.5–2.5)
MCH RBC QN AUTO: 29.2 PG (ref 27–33)
MCHC RBC AUTO-ENTMCNC: 31.2 G/DL (ref 32.2–35.5)
MCV RBC AUTO: 93.7 FL (ref 81.4–97.8)
MONOCYTES # BLD AUTO: 0.76 K/UL (ref 0–0.85)
MONOCYTES NFR BLD AUTO: 8.4 % (ref 0–13.4)
NEUTROPHILS # BLD AUTO: 6.28 K/UL (ref 1.82–7.42)
NEUTROPHILS NFR BLD: 69.4 % (ref 44–72)
NRBC # BLD AUTO: 0 K/UL
NRBC BLD-RTO: 0 /100 WBC (ref 0–0.2)
PHOSPHATE SERPL-MCNC: 3.4 MG/DL (ref 2.5–4.5)
PLATELET # BLD AUTO: 218 K/UL (ref 164–446)
PMV BLD AUTO: 10.3 FL (ref 9–12.9)
POTASSIUM SERPL-SCNC: 4.5 MMOL/L (ref 3.6–5.5)
PROT SERPL-MCNC: 5.3 G/DL (ref 6–8.2)
RBC # BLD AUTO: 3.01 M/UL (ref 4.2–5.4)
SODIUM SERPL-SCNC: 139 MMOL/L (ref 135–145)
TSH SERPL DL<=0.005 MIU/L-ACNC: 1.49 UIU/ML (ref 0.38–5.33)
WBC # BLD AUTO: 9 K/UL (ref 4.8–10.8)

## 2023-08-31 PROCEDURE — 84100 ASSAY OF PHOSPHORUS: CPT

## 2023-08-31 PROCEDURE — 36415 COLL VENOUS BLD VENIPUNCTURE: CPT

## 2023-08-31 PROCEDURE — A9270 NON-COVERED ITEM OR SERVICE: HCPCS | Performed by: PHYSICAL MEDICINE & REHABILITATION

## 2023-08-31 PROCEDURE — 97161 PT EVAL LOW COMPLEX 20 MIN: CPT

## 2023-08-31 PROCEDURE — 97530 THERAPEUTIC ACTIVITIES: CPT

## 2023-08-31 PROCEDURE — 700102 HCHG RX REV CODE 250 W/ 637 OVERRIDE(OP): Performed by: PHYSICAL MEDICINE & REHABILITATION

## 2023-08-31 PROCEDURE — 83735 ASSAY OF MAGNESIUM: CPT

## 2023-08-31 PROCEDURE — 97165 OT EVAL LOW COMPLEX 30 MIN: CPT

## 2023-08-31 PROCEDURE — 99223 1ST HOSP IP/OBS HIGH 75: CPT | Performed by: PHYSICAL MEDICINE & REHABILITATION

## 2023-08-31 PROCEDURE — 700111 HCHG RX REV CODE 636 W/ 250 OVERRIDE (IP): Performed by: PHYSICAL MEDICINE & REHABILITATION

## 2023-08-31 PROCEDURE — 770010 HCHG ROOM/CARE - REHAB SEMI PRIVAT*

## 2023-08-31 PROCEDURE — 85025 COMPLETE CBC W/AUTO DIFF WBC: CPT

## 2023-08-31 PROCEDURE — 97535 SELF CARE MNGMENT TRAINING: CPT

## 2023-08-31 PROCEDURE — 80053 COMPREHEN METABOLIC PANEL: CPT

## 2023-08-31 PROCEDURE — 84443 ASSAY THYROID STIM HORMONE: CPT

## 2023-08-31 RX ORDER — TRAMADOL HYDROCHLORIDE 50 MG/1
25 TABLET ORAL EVERY 4 HOURS PRN
Status: DISCONTINUED | OUTPATIENT
Start: 2023-08-31 | End: 2023-09-11 | Stop reason: HOSPADM

## 2023-08-31 RX ORDER — CIPROFLOXACIN 500 MG/1
500 TABLET, FILM COATED ORAL EVERY 12 HOURS
Status: DISCONTINUED | OUTPATIENT
Start: 2023-08-31 | End: 2023-09-03

## 2023-08-31 RX ORDER — ENOXAPARIN SODIUM 100 MG/ML
40 INJECTION SUBCUTANEOUS DAILY
Status: DISCONTINUED | OUTPATIENT
Start: 2023-09-01 | End: 2023-09-10

## 2023-08-31 RX ORDER — TRAMADOL HYDROCHLORIDE 50 MG/1
50 TABLET ORAL EVERY 4 HOURS PRN
Status: DISCONTINUED | OUTPATIENT
Start: 2023-08-31 | End: 2023-09-11 | Stop reason: HOSPADM

## 2023-08-31 RX ORDER — METHYLPHENIDATE HYDROCHLORIDE 5 MG/1
10 TABLET ORAL ONCE
Status: COMPLETED | OUTPATIENT
Start: 2023-08-31 | End: 2023-08-31

## 2023-08-31 RX ORDER — DEXTROAMPHETAMINE SACCHARATE, AMPHETAMINE ASPARTATE MONOHYDRATE, DEXTROAMPHETAMINE SULFATE AND AMPHETAMINE SULFATE 2.5; 2.5; 2.5; 2.5 MG/1; MG/1; MG/1; MG/1
30 CAPSULE, EXTENDED RELEASE ORAL DAILY
Status: DISCONTINUED | OUTPATIENT
Start: 2023-08-31 | End: 2023-08-31

## 2023-08-31 RX ADMIN — Medication: at 11:30

## 2023-08-31 RX ADMIN — CIPROFLOXACIN 500 MG: 500 TABLET, FILM COATED ORAL at 20:55

## 2023-08-31 RX ADMIN — POLYETHYLENE GLYCOL 3350 1 PACKET: 17 POWDER, FOR SOLUTION ORAL at 08:17

## 2023-08-31 RX ADMIN — ONDANSETRON 4 MG: 4 TABLET, ORALLY DISINTEGRATING ORAL at 09:59

## 2023-08-31 RX ADMIN — OMEPRAZOLE 20 MG: 20 CAPSULE, DELAYED RELEASE ORAL at 08:17

## 2023-08-31 RX ADMIN — ENOXAPARIN SODIUM 30 MG: 100 INJECTION SUBCUTANEOUS at 08:17

## 2023-08-31 RX ADMIN — SENNOSIDES AND DOCUSATE SODIUM 2 TABLET: 50; 8.6 TABLET ORAL at 08:17

## 2023-08-31 RX ADMIN — OXYCODONE HYDROCHLORIDE 10 MG: 10 TABLET ORAL at 07:40

## 2023-08-31 RX ADMIN — METHYLPHENIDATE HYDROCHLORIDE 10 MG: 5 TABLET ORAL at 12:26

## 2023-08-31 RX ADMIN — TRAMADOL HYDROCHLORIDE 25 MG: 50 TABLET ORAL at 17:44

## 2023-08-31 RX ADMIN — Medication 4.5 MG: at 20:55

## 2023-08-31 RX ADMIN — ACETAMINOPHEN 500 MG: 500 TABLET ORAL at 07:04

## 2023-08-31 ASSESSMENT — GAIT ASSESSMENTS
DISTANCE (FEET): 20
GAIT LEVEL OF ASSIST: CONTACT GUARD ASSIST
DEVIATION: DECREASED TOE OFF;DECREASED HEEL STRIKE;ANTALGIC
ASSISTIVE DEVICE: FRONT WHEEL WALKER

## 2023-08-31 ASSESSMENT — ACTIVITIES OF DAILY LIVING (ADL)
BED_CHAIR_WHEELCHAIR_TRANSFER_DESCRIPTION: SET-UP OF EQUIPMENT;SUPERVISION FOR SAFETY;VERBAL CUEING;ADAPTIVE EQUIPMENT
TOILETING: INDEPENDENT
TOILET_TRANSFER_DESCRIPTION: GRAB BAR;SET-UP OF EQUIPMENT;SUPERVISION FOR SAFETY;VERBAL CUEING;REQUIRES LIFT
BED_CHAIR_WHEELCHAIR_TRANSFER_DESCRIPTION: INCREASED TIME;INITIAL PREPARATION FOR TASK;SET-UP OF EQUIPMENT;SUPERVISION FOR SAFETY;VERBAL CUEING
BED_CHAIR_WHEELCHAIR_TRANSFER_DESCRIPTION: INCREASED TIME;INITIAL PREPARATION FOR TASK;SET-UP OF EQUIPMENT;VERBAL CUEING

## 2023-08-31 ASSESSMENT — BRIEF INTERVIEW FOR MENTAL STATUS (BIMS)
INITIAL REPETITION OF BED BLUE SOCK - FIRST ATTEMPT: 3
ASKED TO RECALL SOCK: YES, NO CUE REQUIRED
ASKED TO RECALL BLUE: YES, NO CUE REQUIRED
ASKED TO RECALL BED: YES, NO CUE REQUIRED
WHAT DAY OF THE WEEK IS IT: CORRECT
WHAT YEAR IS IT: CORRECT
BIMS SUMMARY SCORE: 15
WHAT MONTH IS IT: ACCURATE WITHIN 5 DAYS

## 2023-08-31 NOTE — PROGRESS NOTES
Patient admitted to facility at 1747 via gurney; accompanied by hospital transport. Patient assisted to room and positioned in bed for comfort and safety, call light within reach. Patient assisted with stowing belongings and oriented to room and facility. Admission assessment performed and documented in computer. Admission paperwork completed, signed copies placed in chart. Will continue to monitor.

## 2023-08-31 NOTE — CARE PLAN
"  Problem: Bladder / Voiding  Goal: Patient will establish and maintain regular urinary output  Note: Pt has torres catheter in place which is draining adequate amount of urine.Cleansed with soap and water.Will continue to monitor.     Problem: Bowel Elimination  Goal: Patient will participate in bowel management program  Note: Pt refused scheduled senna at hs.Education given on the importance of medication, pt verbalized understanding.LBM 8/30.Will continue to monitor.     Problem: Fall Risk - Rehab  Goal: Patient will remain free from falls  Note: Amie Kyle Fall risk Assessment Score: 18    High fall risk Interventions   - Alarming seatbelt  - Bed and strip alarm   - Yellow sign by the door   - Yellow wrist band \"Fall risk\"  - Room near to the nurse station  - Do not leave patient unattended in the bathroom  - Fall risk education provided                 "

## 2023-08-31 NOTE — THERAPY
Occupational Therapy   Initial Evaluation     Patient Name: Barbra العراقي  Age:  78 y.o., Sex:  female  Medical Record #: [id number]  Today's Date: 8/31/2023     Subjective    Patient was awake in bed and agreeable to OT.  She reported hip pain and was just receiving a Tylenol.  After moving out of bed, she requested an oxycodone.  Patient is pleasant and appreciative of assistance this morning.     Objective       08/31/23 0701   OT Charge Group   Charges Yes   OT Self Care / ADL (Units) 1   OT Evaluation OT Evaluation Low   OT Total Time Spent   OT Individual Total Time Spent (Mins) 60   Prior Living Situation   Prior Services Home-Independent   Housing / Facility 2 Story Apartment / Condo   Steps Into Home   (elevator)   Steps In Home 0   Elevator Yes   Bathroom Set up Bathtub / Shower Combination   Equipment Owned Front-Wheel Walker;Single Point Cane   Lives with - Patient's Self Care Capacity Alone and Able to Care For Self   Comments has a daughter in town and a son in [place]   Prior Level of ADL Function   Self Feeding Independent   Grooming / Hygiene Independent   Bathing Independent   Dressing Independent   Toileting Independent   Prior Level of IADL Function   Medication Management Independent   Laundry Independent   Kitchen Mobility Independent   Finances Independent   Home Management Independent   Shopping Independent   Prior Level Of Mobility Independent With Device in Community;Independent With Device in Home   Driving / Transportation Utilizes [employer] for Transportation;Walks   Occupation (Pre-Hospital Vocational) 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, , )   Prior Functioning: Everyday Activities   Self Care Independent   Indoor Mobility (Ambulation) Independent   Stairs Independent   Functional Cognition Independent   Prior Device Use None of the given options   Vitals   O2 Delivery Device None - Room Air   Pain 0 - 10 Group   Location Hip   Location Orientation Right  "  Therapist Pain Assessment Prior to Activity;During Activity;Nurse Notified;5;8   Non Verbal Descriptors   Non Verbal Scale  Grimacing;Restlessness;Tense Body Language   Cognition    Cognition / Consciousness WDL   Level of Consciousness Alert   ABS (Agitated Behavior Scale)   Agitated Behavior Scale Performed No   Cognitive Pattern Assessment   Cognitive Pattern Assessment Used BIMS   Brief Interview for Mental Status (BIMS)   Repetition of Three Words (First Attempt) 3   Temporal Orientation: Year Correct   Temporal Orientation: Month Accurate within 5 days   Temporal Orientation: Day Correct   Recall: \"Sock\" Yes, no cue required   Recall: \"Blue\" Yes, no cue required   Recall: \"Bed\" Yes, no cue required   BIMS Summary Score 15   Confusion Assessment Method (CAM)   Is there evidence of an acute change in mental status from the patient's baseline? No   Inattention Behavior not present   Disorganized thinking Behavior not present   Altered level of consciousness Behavior not present   Vision Screen   Vision Not tested   Passive ROM Upper Body   Passive ROM Upper Body WDL   Active ROM Upper Body   Active ROM Upper Body  WDL   Strength Upper Body   Upper Body Strength  X   Gross Strength Generalized Weakness, Equal Bilaterally.    Sensation Upper Body   Upper Extremity Sensation  Not Tested   Upper Body Muscle Tone   Upper Body Muscle Tone  Not Tested   Balance Assessment   Sitting Balance (Static) Good   Sitting Balance (Dynamic) Fair +   Standing Balance (Static) Fair -  (with support of FWW)   Standing Balance (Dynamic) Poor +  (with support of FWW)   Weight Shift Sitting Fair   Weight Shift Standing Poor   Bed Mobility    Supine to Sit Minimal Assist   Sit to Stand Moderate Assist   Scooting Contact Guard Assist   Coordination Upper Body   Coordination WDL   Eating   Assistance Needed Independent   CARE Score - Eating 6   Eating Discharge Goal   Discharge Goal 6   Oral Hygiene   Assistance Needed Set-up / clean-up "   CARE Score - Oral Hygiene 5   Oral Hygiene Discharge Goal   Discharge Goal 6   Shower/Bathe Self   Reason if not Attempted Refused to perform   CARE Score - Shower/Bathe Self 7   Shower/Bathe Self Discharge Goal   Discharge Goal 6   Upper Body Dressing   Assistance Needed Set-up / clean-up   CARE Score - Upper Body Dressing 5   Upper Body Dressing Discharge Goal   Discharge Goal 6   Lower Body Dressing   Assistance Needed Physical assistance   Physical Assistance Level Total assistance   CARE Score - Lower Body Dressing 1   Lower Body Dressing Discharge Goal   Discharge Goal 6   Putting On/Taking Off Footwear   Assistance Needed Physical assistance   Physical Assistance Level Total assistance   CARE Score - Putting On/Taking Off Footwear 1   Putting On/Taking Off Footwear Discharge Goal   Discharge Goal 6   Toileting Hygiene   Assistance Needed Physical assistance   Physical Assistance Level Total assistance   CARE Score - Toileting Hygiene 1   Toileting Hygiene Discharge Goal   Discharge Goal 6   Toilet Transfer   Assistance Needed Physical assistance   Physical Assistance Level 26%-50%   CARE Score - Toilet Transfer 3   Toilet Transfer Discharge Goal   Discharge Goal 6   Hearing, Speech, and Vision   Ability to Hear Adequate   Ability to See in Adequate Light Adequate   Expression of Ideas and Wants Without difficulty   Understanding Verbal and Non-Verbal Content Understands   Functional Level of Assist   Eating Independent   Grooming Supervision;Seated   Grooming Description Set-up of equipment;Seated in wheelchair at sink   Bathing   (declined offer to shower on eval day)   Upper Body Dressing Supervision   Upper Body Dressing Description Set-up of equipment   Lower Body Dressing Total Assist   Lower Body Dressing Description Set-up of equipment;Supervision for safety  (assist with 7/7 tasks to don/doff socks and don pants)   Toileting Total Assist   Toileting Description Assist to pull pants up;Assist for  hygiene;Assist to pull pants down;Set-up of equipment;Supervision for safety;Verbal cueing;Grab bar   Bed, Chair, Wheelchair Transfer Contact Guard Assist   Bed Chair Wheelchair Transfer Description Set-up of equipment;Supervision for safety;Verbal cueing;Adaptive equipment  (CGA FWW bed to w/c)   Toilet Transfers Moderate Assist   Toilet Transfer Description Grab bar;Set-up of equipment;Supervision for safety;Verbal cueing;Requires lift   Tub / Shower Transfers Refused   Precautions   Precautions Fall Risk;Weight Bearing As Tolerated Right Lower Extremity   Comments Eileen, wound vac to R hip   Current Discharge Plan   Current Discharge Plan Return to Prior Living Situation   Benefit    Therapy Benefit Patient Would Benefit from Inpatient Rehab Occupational Therapy to Maximize [place] with ADLs, IADLs and Functional Mobility.   Interdisciplinary Plan of Care Collaboration   IDT Collaboration with  Nursing;Physical Therapist;Occupational Therapist   Patient Position at End of Therapy Seated;Chair Alarm On;Self Releasing Lap Belt Applied  (left in dining room for breakfast)   Collaboration Comments requested pain meds from RN; CLOF with PT and OT   Equipment Needs   Assistive Device / DME Parallel Bars;Front-Wheel Walker;Wheelchair;Shower Chair;Raised Toilet Seat;Grab Bars In Shower / Tub;Grab Bars By Toilet   Adaptive Equipment Reacher;Sock Aide;Dressing Stick;Long Handled Shoe Horn;Long Handled Sponge;Leg    Strengths & Barriers   Strengths Able to follow instructions;Alert and oriented;Effective communication skills;Good insight into deficits/needs;Independent prior level of function;Motivated for self care and independence;Pleasant and cooperative;Supportive family;Willingly participates in therapeutic activities   Barriers Decreased endurance;Constipation;Generalized weakness;Impaired balance;Limited mobility;Pain   Occupational Therapist Assigned   Assigned OT / Treatment Time / Comments APTY primary,  can do 60 or 30 minute sessions       Assessment  Patient is 78 y.o. 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  Additional factors influencing patient status / progress (ie: cognitive factors, co-morbidities, social support, etc): Depression, hx of bilateral SKIP's, has a daughter in town.    Plan  Recommend Occupational Therapy  minutes per day 5-7 days per week for 10-14 days for the following treatments:  OT Group Therapy, OT Self Care/ADL, OT Community Reintegration, OT Manual Ther Technique, OT Neuro Re-Ed/Balance, OT Therapeutic Activity, OT Evaluation, and OT Therapeutic Exercise.    Passport items to be completed:  Perform bathroom transfers, complete dressing, complete feeding, get ready for the day, prepare a simple meal, participate in household tasks, adapt home for safety needs, demonstrate home exercise program, complete caregiver training     Goals:  Long term and short term goals have been discussed with patient and they are in agreement.    Occupational Therapy Goals (Active)       Problem: Dressing       Dates: Start:  08/31/23         Goal: STG-Within one week, patient will dress LB with max A using AE       Dates: Start:  08/31/23               Problem: Functional Transfers       Dates: Start:  08/31/23         Goal: STG-Within one week, patient will transfer to toilet with min A using grab bar and commode over toilet       Dates: Start:  08/31/23               Problem: OT Long Term Goals       Dates: Start:  08/31/23         Goal: LTG-By discharge, patient will complete basic self care tasks with mod I       Dates: Start:  08/31/23            Goal: LTG-By discharge, patient will perform bathroom transfers with mod I       Dates: Start:  08/31/23            Goal: LTG-By discharge, patient will complete basic home management with supervision/mod I       Dates: Start:  08/31/23               Problem: Toileting       Dates: Start:  08/31/23         Goal:  STG-Within one week, patient will complete toileting tasks with max A        Dates: Start:  08/31/23

## 2023-08-31 NOTE — THERAPY
Occupational Therapy  Daily Treatment     Patient Name: Barbra العراقي  Age:  78 y.o., Sex:  female  Medical Record #: [id number]  Today's Date: 8/31/2023     Precautions  Precautions: Fall Risk, Weight Bearing As Tolerated Right Lower Extremity  Comments: Arellano, wound vac to R hip    Subjective    Pt pleasant and willing to participate in OT  Reports feeling tired and a little nauseous d/t pain meds     Objective     08/31/23 0931   OT Charge Group   Charges Yes   OT Self Care / ADL (Units) 2   OT Total Time Spent   OT Individual Total Time Spent (Mins) 30   Precautions   Precautions Fall Risk;Weight Bearing As Tolerated Right Lower Extremity   Comments Arellano, wound vac to R hip   Vitals   O2 Delivery Device None - Room Air   Pain   Intervention Medication (see MAR)   Pain 0 - 10 Group   Location Hip   Location Orientation Right   Non Verbal Descriptors   Non Verbal Scale    (Pt c/o nausea, reports likely d/t pain meds she had received)   Cognition    Level of Consciousness Responds to voice   ABS (Agitated Behavior Scale)   Agitated Behavior Scale Performed No   Sleep/Wake Cycle   Sleep & Rest Resting   Functional Level of Assist   Lower Body Dressing Moderate Assist  (EOB; stood w/ FWW)   Lower Body Dressing Description Assistive devices;Reacher;Sock aid;Assist with threading into pant leg;Increased time;Initial preparation for task;Set-up of equipment;Supervision for safety;Verbal cueing  (Educated pt reacher and sock aide use for LB dressing. Pt demo'd understanding provided v/c and assist for technique and threading LEs into pants.)   Bed, Chair, Wheelchair Transfer Minimal Assist  (w/ bed raised for height boost)   Bed Chair Wheelchair Transfer Description Increased time;Initial preparation for task;Set-up of equipment;Supervision for safety;Verbal cueing  (w/ FWW, standing from EOB)   Bed Mobility    Supine to Sit Contact Guard Assist   Sit to Supine Moderate Assist   Scooting Standby Assist   Rolling Contact  Guard Assist   Interdisciplinary Plan of Care Collaboration   Patient Position at End of Therapy In Bed;Bed Alarm On;Call Light within Reach;Tray Table within Reach   Strengths & Barriers   Strengths Able to follow instructions;Alert and oriented;Effective communication skills;Good insight into deficits/needs;Independent prior level of function;Motivated for self care and independence;Pleasant and cooperative;Supportive family;Willingly participates in therapeutic activities   Barriers Decreased endurance;Constipation;Generalized weakness;Impaired balance;Limited mobility;Pain     Assessment    Pt seen for tx, addressing LB dressing w/ AE. Pt tolerated activity fair w/ c/o nausea; RN notified. Pt required decreased LB dressing assist w/ AE. Pt progressing towards goals. Continue OT POC.    Strengths: Able to follow instructions, Alert and oriented, Effective communication skills, Good insight into deficits/needs, Independent prior level of function, Motivated for self care and independence, Pleasant and cooperative, Supportive family, Willingly participates in therapeutic activities    Barriers: Decreased endurance, Constipation, Generalized weakness, Impaired balance, Limited mobility, Pain    Plan    ADLs w/ AE  Functional transfers/mobility  Balance  UE strengthening    Passport items to be completed:  Perform bathroom transfers, complete dressing, complete feeding, get ready for the day, prepare a simple meal, participate in household tasks, adapt home for safety needs, demonstrate home exercise program, complete caregiver training     Occupational Therapy Goals (Active)       Problem: Dressing       Dates: Start:  08/31/23         Goal: STG-Within one week, patient will dress LB with max A using AE       Dates: Start:  08/31/23               Problem: Functional Transfers       Dates: Start:  08/31/23         Goal: STG-Within one week, patient will transfer to toilet with min A using grab bar and commode over  toilet       Dates: Start:  08/31/23               Problem: OT Long Term Goals       Dates: Start:  08/31/23         Goal: LTG-By discharge, patient will complete basic self care tasks with mod I       Dates: Start:  08/31/23            Goal: LTG-By discharge, patient will perform bathroom transfers with mod I       Dates: Start:  08/31/23            Goal: LTG-By discharge, patient will complete basic home management with supervision/mod I       Dates: Start:  08/31/23               Problem: Toileting       Dates: Start:  08/31/23         Goal: STG-Within one week, patient will complete toileting tasks with max A        Dates: Start:  08/31/23

## 2023-08-31 NOTE — DISCHARGE PLANNING
CASE MANAGEMENT INITIAL ASSESSMENT    Admit Date:  8/30/2023     I met with patient to discuss role of case management / discharge planning / team conference.   Patient is a  78 y.o. female transferred from [facility]    Diagnosis: Closed right hip fracture, initial encounter ([facility]) [S72.001A]    Co-morbidities:   Patient Active Problem List    Diagnosis Date Noted    Closed right hip fracture, initial encounter ([facility]) 08/29/2023    Hypokalemia 08/29/2023    Depression 08/29/2023    Leukocytosis 08/29/2023     Prior Living Situation:  Housing / Facility: 2 Story Apartment / Condo  Lives with - Patient's Self Care Capacity: Alone and Able to Care For Self    Prior Level of Function:  Medication Management: Independent  Finances: Independent  Home Management: Independent  Shopping: Independent  Prior Level Of Mobility: Independent With Device in Community, Independent With Device in Home  Driving / Transportation: Utilizes [employer] for Transportation, Walks    Support Systems:  Primary : Birgit العراقي-daughter: [phone]       Previous Services Utilized:   Equipment Owned: Front-Wheel Walker, Single Point Cane  Prior Services: Home-Independent    Other Information:  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 Galvan    Patient / Family Goal:       Plan:  1. Continue to follow patient through hospitalization and provide discharge planning in collaboration with patient, family, physicians and ancillary services.     2. Utilize community resources to ensure a safe discharge.

## 2023-08-31 NOTE — THERAPY
Physical Therapy   Daily Treatment     Patient Name: Barbra العراقي  Age:  78 y.o., Sex:  female  Medical Record #: [id number]  Today's Date: 8/31/2023     Precautions  Precautions: Fall Risk, Weight Bearing As Tolerated Right Lower Extremity  Comments: Arellano, wound vac to R hip    Subjective    Pt was supine in bed upon arrival and agreeable to treatment.        Objective       08/31/23 1401   PT Charge Group   PT Therapeutic Activities (Units) 2   PT Total Time Spent   PT Individual Total Time Spent (Mins) 30   Precautions   Precautions Fall Risk;Weight Bearing As Tolerated Right Lower Extremity   Supine Lower Body Exercise   Hip Abduction 1 set of 10;Bilateral   Heel Slide 1 set of 10;Bilateral  (use of leg  for AAROM)   Ankle Pumps 1 set of 10;Bilateral   Gluteal Isometrics 1 set of 10;Bilateral   Quadriceps Isometrics 1 set of 10;Bilateral   Interdisciplinary Plan of Care Collaboration   Patient Position at End of Therapy In Bed;Call Light within Reach;Tray Table within Reach;Phone within Reach   Strengths & Barriers   Strengths Able to follow instructions;Effective communication skills;Good insight into deficits/needs;Independent prior level of function;Motivated for self care and independence;Pleasant and cooperative;Supportive family;Willingly participates in therapeutic activities   Barriers Decreased endurance;Generalized weakness;Impaired activity tolerance;Impaired balance;Limited mobility;Pain;Pain poorly managed     Reviewed with pt supine HEP to complete during off times of therapy.  Pt given leg  for increased independent with LE positioning in bed and bed mobility.     Assessment    Pt able to reposition RLE in bed with AE but continues to be pain limited.  Pt with good motivation to complete LE strengthening activities and although pain limited able to utilize AE for AAROM as tolerated.   Strengths: Able to follow instructions, Effective communication skills, Good insight into deficits/needs,  Independent prior level of function, Motivated for self care and independence, Pleasant and cooperative, Supportive family, Willingly participates in therapeutic activities  Barriers: Decreased endurance, Generalized weakness, Impaired activity tolerance, Impaired balance, Limited mobility, Pain, Pain poorly managed    Plan    Continue to progress bed mobility with use of leg , STS/transfer training, ambulation, LE strengthening    Passport items to be completed:  Get in/out of bed safely, in/out of a vehicle, safely use mobility device, walk or wheel around home/community, navigate up and down stairs, show how to get up/down from the ground, ensure home is accessible, demonstrate HEP, complete caregiver training    Physical Therapy Problems (Active)       There are no active problems.

## 2023-08-31 NOTE — FLOWSHEET NOTE
08/30/23 1800   Events/Summary/Plan   Events/Summary/Plan RT Assessment   Vital Signs   Pulse 62   Respiration 16   Pulse Oximetry 92 %   $ Pulse Oximetry (Spot Check) Yes   Respiratory Assessment   Respiratory Pattern Within Normal Limits   Level of Consciousness Alert   Chest Exam   Work Of Breathing / Effort Within Normal Limits   Oxygen   O2 Delivery Device None - Room Air

## 2023-08-31 NOTE — FLOWSHEET NOTE
08/30/23 1805   Protocol Assessment   Initial Assessment Yes   Patient History   Pulmonary Diagnosis Acute anemia   Procedures Relevant to Respiratory Status None   Home O2 No   Nocturnal CPAP No   Home Treatments/Frequency No   Protocol Pathways   Protocol Pathways None

## 2023-08-31 NOTE — PROGRESS NOTES
4 Eyes Skin Assessment Completed by JOB Cuadra and JOB Noel.    Head WDL  Ears WDL  Nose WDL  Mouth WDL missing teeth discolored  Neck WDL  Breast/Chest WDL  Shoulder Blades WDL  Spine WDL  (R) Arm/Elbow/Hand Redness, Bruising, and Abrasion  (L) Arm/Elbow/Hand Redness, Bruising, and Abrasion  Abdomen WDL  Groin WDL  Scrotum/Coccyx/Buttocks Redness and Blanching photo in computer  (R) Leg Incision wound vac  (L) Leg WDL  (R) Heel/Foot/Toe Discoloration  (L) Heel/Foot/Toe Discoloration          Devices In Places Arellano + wound vac      Interventions In Place Waffle Overlay and Pillows    Possible Skin Injury No    Pictures Uploaded Into Epic Yes  Wound Consult Placed N/A  RN Wound Prevention Protocol Ordered Yes

## 2023-08-31 NOTE — PROGRESS NOTES
Discharge instructions provided & reviewed with patient. Arellano placed per active order prior to discharge due to retention. Urine noted to be cloudy & malodorous. Updated Perlita Fang M.D. Also notified receiving RN about urine & she stated that she will get an order for a UA when patient arrives to rehab. Receiving RN requested to leave PIVs in place. Prevena also in place at time of discharge. Patient transferred to [facility]. Report given to JOB Noel. All belongings taken.

## 2023-08-31 NOTE — CARE PLAN
"The patient is Watcher - Medium risk of patient condition declining or worsening    Shift Goals  Patient Goals: pain control    Problem: Bladder / Voiding  Goal: Patient will establish and maintain regular urinary output  Note: Patient with indwelling catheter in place draining adequate amounts of urine to gravity; skin intact; no leakage. UA has been collected pending results.      Problem: Fall Risk - Rehab  Goal: Patient will remain free from falls  Note: Amie Wright Fall risk Assessment Score: 18    High fall risk Interventions   - Bed and strip alarm   - Yellow sign by the door   - Yellow wrist band \"Fall risk\"  - Room near to the nurse station  - Do not leave patient unattended in the bathroom  - Fall risk education provided     "

## 2023-08-31 NOTE — CARE PLAN
"The patient is Watcher - Medium risk of patient condition declining or worsening    Shift Goals  Clinical Goals: safety, pain control    Problem: Bladder / Voiding  Goal: Patient will establish and maintain regular urinary output  Note: Arellano discontinued per order, educated on time void, PVRs, and ICP if unable to void within 6 hours.   Arellano removed at 1740     Problem: Fall Risk - Rehab  Goal: Patient will remain free from falls  Note: Amie Wright Fall risk Assessment Score: 16    High fall risk Interventions   - Bed and strip alarm   - Yellow sign by the door   - Yellow wrist band \"Fall risk\"  - Room near to the nurse station  - Do not leave patient unattended in the bathroom  - Fall risk education provided     "

## 2023-08-31 NOTE — PROGRESS NOTES
NURSING DAILY NOTE    Name: Barbra العراقي   Date of Admission: 8/30/2023   Admitting Diagnosis: Closed right hip fracture, initial encounter (Formerly [facility])  Attending Physician: Sara Diaz M.d.  Allergies: Ampicillin and Keflex [cephalexin]    Safety  Patient Assist     Patient Precautions     Precaution Comments     Bed Transfer Status     Toilet Transfer Status      Assistive Devices  Rails, Wheelchair  Oxygen  None - Room Air  Diet/Therapeutic Dining  Current Diet Order   Procedures    Diet Order Diet: Regular     Pill Administration  whole  Agitated Behavioral Scale     ABS Level of Severity       Fall Risk  Has the patient had a fall this admission?   No  Aime Wright Fall Risk Scoring  18, HIGH RISK  Fall Risk Safety Measures  bed alarm and chair alarm    Vitals  Temperature: 36.8 °C (98.3 °F)  Temp src: Oral  Pulse: 93  Respiration: 20  Blood Pressure : 136/71  Blood Pressure MAP (Calculated): 93 MM HG  BP Location: Left, Upper Arm  Patient BP Position: Supine     Oxygen  Pulse Oximetry: 92 %  O2 (LPM): 0  O2 Delivery Device: None - Room Air    Bowel and Bladder  Last Bowel Movement  08/30/23  Stool Type     Bowel Device     Continent  Bladder: Did not void (torres in place)   Bowel: Continent movement  Bladder Function  Urine Void (mL): 500 ml  Urine Color: Brown (milky thick brown)  Urine Clarity: Cloudy (Unable to see Through), Sediment  Genitourinary Assessment   Bladder Assessment (WDL):  WDL Except  Torres Catheter: Present with Active Order  Torres Reasons per MD Order: Acute urinary retention or bladder outlet obstruction  Urine Color: Brown (milky thick brown)  Urine Clarity: Cloudy (Unable to see Through), Sediment  Bladder Device: Indwelling Catheter    Skin  Escobar Score   16  Sensory Interventions   Bed Types: Standard/Trauma Mattress  Skin Preventative Measures: Pillows in Use for Support / Positioning, Waffle Overlay  Moisture  Interventions  Moisturizers/Barriers: Barrier Wipes      Pain  Pain Rating Scale  0 - No Pain  Pain Location  Leg  Pain Location Orientation  Right  Pain Interventions   Repositioned, Rest    ADLs    Bathing      Linen Change      Personal Hygiene     Chlorhexidine Bath      Oral Care     Teeth/Dentures     Shave     Nutrition Percentage Eaten  Dinner, Between 25-50% Consumed  Environmental Precautions     Patient Turns/Positioning  Patient Turns Self from Side to Side  Patient Turns Assistance/Tolerance     Bed Positions     Head of Bed Elevated         Psychosocial/Neurologic Assessment  Psychosocial Assessment  Psychosocial (WDL):  Within Defined Limits  Neurologic Assessment  Neuro (WDL): Exceptions to WDL (neuropathy)  Level of Consciousness: Alert  EENT (WDL):  (glasses at home - missing teeth)    Cardio/Pulmonary Assessment  Edema      Respiratory Breath Sounds     Cardiac Assessment   Cardiac (WDL):  Within Defined Limits

## 2023-08-31 NOTE — THERAPY
Physical Therapy   Initial Evaluation     Patient Name: Barbra العراقي  Age:  78 y.o., Sex:  female  Medical Record #: [id number]  Today's Date: 8/31/2023     Subjective    Patient agreeable to PT eval     Objective       08/31/23 1231   PT Charge Group   PT Therapeutic Activities (Units) 1   PT Evaluation PT Evaluation Low   PT Total Time Spent   PT Individual Total Time Spent (Mins) 60   Prior Living Situation   Prior Services Home-Independent   Housing / Facility 2 Story House   Steps In Home 0   Elevator Yes   Equipment Owned Front-Wheel Walker;Single Point Cane   Lives with - Patient's Self Care Capacity Alone and Able to Care For Self   Comments has a daughter in town and a son in [place]   Prior Functioning: Everyday Activities   Indoor Mobility (Ambulation) Independent   Stairs Independent   Prior Device Use Manual wheelchair;None of the given options   Pain 0 - 10 Group   Therapist Pain Assessment Prior to Activity;2   Strength Lower Body   Lower Body Strength  WDL   Comments antalgic response on the right   Balance Assessment   Sitting Balance (Dynamic) Fair   Standing Balance (Static) Fair   Bed Mobility    Supine to Sit Contact Guard Assist   Sit to Supine Moderate Assist  (bilateral LE management into bed)   Sit to Stand Contact Guard Assist   Rolling Supervised   Roll Left and Right   Assistance Needed Supervision;Verbal cues;Adaptive equipment   CARE Score - Roll Left and Right 4   Roll Left and Right Discharge Goal   Discharge Goal 6   Sit to Lying   Assistance Needed Physical assistance   Physical Assistance Level 26%-50%   CARE Score - Sit to Lying 3   Sit to Lying Discharge Goal   Discharge Goal 6   Lying to Sitting on Side of Bed   Assistance Needed Incidental touching;Verbal cues;Adaptive equipment;Set-up / clean-up   CARE Score - Lying to Sitting on Side of Bed 4   Lying to Sitting on Side of Bed Discharge Goal   Discharge Goal 6   Sit to Stand   Assistance Needed Incidental touching;Adaptive  equipment;Verbal cues   CARE Score - Sit to Stand 4   Sit to Stand Discharge Goal   Discharge Goal 6   Chair/Bed-to-Chair Transfer   Assistance Needed Incidental touching;Verbal cues;Set-up / clean-up;Adaptive equipment   CARE Score - Chair/Bed-to-Chair Transfer 4   Chair/Bed-to-Chair Transfer Discharge Goal   Discharge Goal 6   Car Transfer   Reason if not Attempted Safety concerns   CARE Score - Car Transfer 88   Car Transfer Discharge Goal   Discharge Goal 4   Walk 10 Feet   Assistance Needed Incidental touching;Set-up / clean-up;Adaptive equipment   CARE Score - Walk 10 Feet 4   Walk 10 Feet Discharge Goal   Discharge Goal 6   Walk 50 Feet with Two Turns   Reason if not Attempted Safety concerns   CARE Score - Walk 50 Feet with Two Turns 88   Walk 50 Feet with Two Turns Discharge Goal   Discharge Goal 6   Walk 150 Feet   Reason if not Attempted Safety concerns   CARE Score - Walk 150 Feet 88   Walk 150 Feet Discharge Goal   Discharge Goal 6   Walking 10 Feet on Uneven Surfaces   Reason if not Attempted Safety concerns   CARE Score - Walking 10 Feet on Uneven Surfaces 88   Walking 10 Feet on Uneven Surfaces Discharge Goal   Discharge Goal 4   1 Step (Curb)   Reason if not Attempted Safety concerns   CARE Score - 1 Step (Curb) 88   1 Step (Curb) Discharge Goal   Discharge Goal 4   4 Steps   Reason if not Attempted Activity not applicable   CARE Score - 4 Steps 9   4 Steps Discharge Goal   Discharge Goal 9   12 Steps   Reason if not Attempted Activity not applicable   CARE Score - 12 Steps 9   12 Steps Discharge Goal   Discharge Goal 9   Picking Up Object   Reason if not Attempted Activity not applicable   CARE Score - Picking Up Object 9   Picking Up Object Discharge Goal   Discharge Goal 4   Wheel 50 Feet with Two Turns   Reason if not Attempted Activity not applicable   CARE Score - Wheel 50 Feet with Two Turns 9   Type of Wheelchair/Scooter Manual   Wheel 50 Feet with Two Turns Discharge Goal   Discharge Goal 9    Wheel 150 Feet   Reason if not Attempted Activity not applicable   CARE Score - Wheel 150 Feet 9   Type of Wheelchair/Scooter Manual   Wheel 150 Feet Discharge Goal   Discharge Goal 9   Gait Functional Level of Assist    Gait Level Of Assist Contact Guard Assist   Assistive Device Front Wheel Walker   Distance (Feet) 20   # of Times Distance was Traveled 1   Deviation Decreased Toe Off;Decreased Heel Strike;Antalgic  (decreased step length)   Wheelchair Functional Level of Assist   Wheelchair Assist Stand by Assist   Distance Wheelchair (Feet or Distance) 150   Wheelchair Description Assistance with steering;Verbal cueing   Stairs Functional Level of Assist   Level of Assist with Stairs Unable to Participate   Transfer Functional Level of Assist   Bed, Chair, Wheelchair Transfer Contact Guard Assist  (lateral scoot technique)   Bed Chair Wheelchair Transfer Description Increased time;Initial preparation for task;Set-up of equipment;Verbal cueing   Problem List    Problems Pain;Impaired Ambulation;Functional Strength Deficit;Decreased Activity Tolerance   Precautions   Precautions Fall Risk;Weight Bearing As Tolerated Right Lower Extremity   Comments Arellano, wound vac to R hip   Current Discharge Plan   Current Discharge Plan Return to Prior Living Situation   Interdisciplinary Plan of Care Collaboration   IDT Collaboration with  Occupational Therapist;Physical Therapist   Collaboration Comments baseline eval scores, treatment planning   Benefit   Therapy Benefit Patient Would Benefit from Inpatient Rehabilitation Physical Therapy to Maximize Functional [place] with ADLs, IADLs and Mobility.   Strengths & Barriers   Strengths Effective communication skills;Independent prior level of function;Motivated for self care and independence;Willingly participates in therapeutic activities   Barriers Pain;Impaired activity tolerance;Generalized weakness;Fatigue       Assessment  Patient is 78 y.o. female with a diagnosis  of right hip fracture.  Additional factors influencing patient status / progress (ie: cognitive factors, co-morbidities, social support, etc): lives alone, independent PLOF.      Plan  Recommend Physical Therapy  minutes per day 5-7 days per week for 14 days for the following treatments:  PT Gait Training, PT Self Care/Home Eval, PT Therapeutic Exercises, PT Neuro Re-Ed/Balance, PT Therapeutic Activity, and PT Evaluation.    Passport items to be completed:  Get in/out of bed safely, in/out of a vehicle, safely use mobility device, walk or wheel around home/community, navigate up and down stairs, show how to get up/down from the ground, ensure home is accessible, demonstrate HEP, complete caregiver training    Goals:  Long term and short term goals have been discussed with patient and they are in agreement.    Physical Therapy Problems (Active)       Problem: Balance       Dates: Start:  08/31/23         Goal: STG-Within one week, patient will maintain static standing 3min with unilateral UE support SPV       Dates: Start:  08/31/23               Problem: Mobility       Dates: Start:  08/31/23         Goal: STG-Within one week, patient will propel wheelchair community 400ft mod I indoors       Dates: Start:  08/31/23            Goal: STG-Within one week, patient will ambulate household distance 25ft x 4 with FWW SBA       Dates: Start:  08/31/23            Goal: STG-Within one week, patient will ambulate up/down a curb with FWW mod assist       Dates: Start:  08/31/23               Problem: Mobility Transfers       Dates: Start:  08/31/23         Goal: STG-Within one week, patient will transfer bed to chair SBA SPT (5 out of 5 trials)       Dates: Start:  08/31/23               Problem: PT-Long Term Goals       Dates: Start:  08/31/23         Goal: LTG-By discharge, patient will tolerate standing 5min at sink to complete ADLs mod I       Dates: Start:  08/31/23            Goal: LTG-By discharge, patient will  ambulate 150ft x 4 with FWW mod I        Dates: Start:  08/31/23            Goal: LTG-By discharge, patient will transfer one surface to another mod I SPT safely and consistently       Dates: Start:  08/31/23            Goal: LTG-By discharge, patient will perform home exercise program seated/standing for LE strengthening to be done 3x/day in safe, independent home environment       Dates: Start:  08/31/23            Goal: LTG-By discharge, patient will up/down curb with FWW CGA       Dates: Start:  08/31/23

## 2023-09-01 ENCOUNTER — APPOINTMENT (OUTPATIENT)
Dept: OCCUPATIONAL THERAPY | Facility: REHABILITATION | Age: 78
DRG: 560 | End: 2023-09-01
Attending: PHYSICAL MEDICINE & REHABILITATION
Payer: MEDICARE

## 2023-09-01 ENCOUNTER — APPOINTMENT (OUTPATIENT)
Dept: PHYSICAL THERAPY | Facility: REHABILITATION | Age: 78
DRG: 560 | End: 2023-09-01
Attending: PHYSICAL MEDICINE & REHABILITATION
Payer: MEDICARE

## 2023-09-01 LAB
EST. AVERAGE GLUCOSE BLD GHB EST-MCNC: 120 MG/DL
HBA1C MFR BLD: 5.8 % (ref 4–5.6)

## 2023-09-01 PROCEDURE — 97116 GAIT TRAINING THERAPY: CPT

## 2023-09-01 PROCEDURE — 83036 HEMOGLOBIN GLYCOSYLATED A1C: CPT

## 2023-09-01 PROCEDURE — 97535 SELF CARE MNGMENT TRAINING: CPT

## 2023-09-01 PROCEDURE — 700111 HCHG RX REV CODE 636 W/ 250 OVERRIDE (IP): Performed by: PHYSICAL MEDICINE & REHABILITATION

## 2023-09-01 PROCEDURE — A9270 NON-COVERED ITEM OR SERVICE: HCPCS | Performed by: PHYSICAL MEDICINE & REHABILITATION

## 2023-09-01 PROCEDURE — 99232 SBSQ HOSP IP/OBS MODERATE 35: CPT | Performed by: PHYSICAL MEDICINE & REHABILITATION

## 2023-09-01 PROCEDURE — 770010 HCHG ROOM/CARE - REHAB SEMI PRIVAT*

## 2023-09-01 PROCEDURE — 97110 THERAPEUTIC EXERCISES: CPT

## 2023-09-01 PROCEDURE — 700102 HCHG RX REV CODE 250 W/ 637 OVERRIDE(OP): Performed by: PHYSICAL MEDICINE & REHABILITATION

## 2023-09-01 PROCEDURE — 97530 THERAPEUTIC ACTIVITIES: CPT

## 2023-09-01 PROCEDURE — 36415 COLL VENOUS BLD VENIPUNCTURE: CPT

## 2023-09-01 RX ORDER — METHYLPHENIDATE HYDROCHLORIDE 5 MG/1
10 TABLET ORAL
Status: DISCONTINUED | OUTPATIENT
Start: 2023-09-01 | End: 2023-09-03

## 2023-09-01 RX ADMIN — Medication: at 21:11

## 2023-09-01 RX ADMIN — CIPROFLOXACIN 500 MG: 500 TABLET, FILM COATED ORAL at 07:59

## 2023-09-01 RX ADMIN — TRAMADOL HYDROCHLORIDE 50 MG: 50 TABLET ORAL at 00:11

## 2023-09-01 RX ADMIN — Medication 4.5 MG: at 20:59

## 2023-09-01 RX ADMIN — CIPROFLOXACIN 500 MG: 500 TABLET, FILM COATED ORAL at 20:59

## 2023-09-01 RX ADMIN — ONDANSETRON 4 MG: 4 TABLET, ORALLY DISINTEGRATING ORAL at 08:25

## 2023-09-01 RX ADMIN — POLYETHYLENE GLYCOL 3350 1 PACKET: 17 POWDER, FOR SOLUTION ORAL at 09:43

## 2023-09-01 RX ADMIN — ENOXAPARIN SODIUM 40 MG: 100 INJECTION SUBCUTANEOUS at 17:37

## 2023-09-01 RX ADMIN — OMEPRAZOLE 20 MG: 20 CAPSULE, DELAYED RELEASE ORAL at 07:59

## 2023-09-01 RX ADMIN — SENNOSIDES AND DOCUSATE SODIUM 2 TABLET: 50; 8.6 TABLET ORAL at 07:59

## 2023-09-01 RX ADMIN — TRAMADOL HYDROCHLORIDE 50 MG: 50 TABLET ORAL at 21:05

## 2023-09-01 RX ADMIN — TRAMADOL HYDROCHLORIDE 50 MG: 50 TABLET ORAL at 09:41

## 2023-09-01 RX ADMIN — TRAMADOL HYDROCHLORIDE 50 MG: 50 TABLET ORAL at 14:38

## 2023-09-01 RX ADMIN — METHYLPHENIDATE HYDROCHLORIDE 10 MG: 5 TABLET ORAL at 11:21

## 2023-09-01 RX ADMIN — Medication: at 09:00

## 2023-09-01 ASSESSMENT — GAIT ASSESSMENTS
DISTANCE (FEET): 30
ASSISTIVE DEVICE: FRONT WHEEL WALKER
GAIT LEVEL OF ASSIST: CONTACT GUARD ASSIST

## 2023-09-01 ASSESSMENT — ACTIVITIES OF DAILY LIVING (ADL)
TUB_SHOWER_TRANSFER_DESCRIPTION: GRAB BAR;SHOWER BENCH;REQUIRES LIFT;SET-UP OF EQUIPMENT;SUPERVISION FOR SAFETY;VERBAL CUEING
BED_CHAIR_WHEELCHAIR_TRANSFER_DESCRIPTION: ADAPTIVE EQUIPMENT;SET-UP OF EQUIPMENT;SUPERVISION FOR SAFETY;VERBAL CUEING

## 2023-09-01 ASSESSMENT — PAIN DESCRIPTION - PAIN TYPE
TYPE: ACUTE PAIN

## 2023-09-01 NOTE — PROGRESS NOTES
Patient's Prevena Plus stopped running due to patient not having a  for device. Wound vac placed for the remaining five days. Wound vac can be removed seven days post op.

## 2023-09-01 NOTE — CARE PLAN
Problem: Psychosocial  Goal: Patient's level of anxiety will decrease  Outcome: Not Progressing   The patient is Watcher - Medium risk of patient condition declining or worsening    Shift Goals  Clinical Goals: safety, pain control  Patient Goals: pain control    Progress made toward(s) clinical / shift goals:  PT admits to be very anxious.  Emotional support provided.      Patient is not progressing towards the following goals:      Problem: Psychosocial  Goal: Patient's level of anxiety will decrease  Outcome: Not Progressing

## 2023-09-01 NOTE — PROGRESS NOTES
PT very upset and anxious over her house cat.  Daughter of patient refusing to take cat in and will be surrendered to [employer].  Patient requests assistance from staff,  to help call to make sure her pet isnt adopted out.  RN advised patient will pass on to morning RN

## 2023-09-01 NOTE — THERAPY
Physical Therapy   Daily Treatment  The following note reflects 2 nonconsecutive treatment sessions the first from 830-930 and the second from [id number] for a total of 120 mintues     Patient Name: Barbra العراقي  Age:  78 y.o., Sex:  female  Medical Record #: [id number]  Today's Date: 9/1/2023     Precautions  Precautions: (P) Fall Risk, Weight Bearing As Tolerated Right Lower Extremity  Comments: (P) Wound vac    Subjective    Pt reports decreased blood pressure early this morning.      Objective       09/01/23 0830   PT Charge Group   Charges Yes   PT Gait Training (Units) 3   PT Therapeutic Exercise (Units) 3   PT Therapeutic Activities (Units) 2   PT Total Time Spent   PT Individual Total Time Spent (Mins) 120   Precautions   Precautions Fall Risk;Weight Bearing As Tolerated Right Lower Extremity   Comments Wound vac   Vitals   Patient BP Position Supine   Blood Pressure  129/71   Vitals Comments RN present and aware of vitals   Pain 0 - 10 Group   Location Hip   Location Orientation Right   Pain Rating Scale (NPRS) 6   Gait Functional Level of Assist    Gait Level Of Assist Contact Guard Assist   Assistive Device Front Wheel Walker   Distance (Feet) 30   # of Times Distance was Traveled 1   Deviation Antalgic;Decreased Base Of Support;Step To;Shuffled Gait   Stairs Functional Level of Assist   Level of Assist with Stairs Unable to Participate   Transfer Functional Level of Assist   Bed, Chair, Wheelchair Transfer Moderate Assist   Bed Mobility    Supine to Sit Minimal Assist   Sit to Supine Moderate Assist   Sit to Stand Minimal Assist   Interdisciplinary Plan of Care Collaboration   Patient Position at End of Therapy In Bed;Bed Alarm On;Phone within Reach;Tray Table within Reach;Call Light within Reach     First session- Pt reporting nausea, RN present in room. BP recorded in supine at 129/71. Sit to supine with MOD A. BP recorded in sitting at 138/75 pt reporting resolved symptoms. Stand pivot transfer with MOD A  and FWW.     Pt ambulated one bout of 25' with FWW and CGA. She denied symptoms during ambulation however post reporting increased nasuea. BP recorded at 121/48.     Pt performed static standing lateral weight shifting with FWW and CGA x 1 min.     Pt was brought back to room. Stand pivot transfer from w/c to bed with FWW and MOD A for sit to stand. Sit to supine with MIN A. Pt remained supine in bed with all needs in reach and bed alarm on    Second session-   Pt reported feeling much better this afternoon. Supine in bed at start supine to sit with SBA using leg . Stand pivot to w/c with FWW and MIN A. Pt was brought to the[employer]pay gym via w/c.     Pt ambulated 3 bouts of 60-80' with FWW and CGA, throughout all ambulation today she demonstrates antalgic gait pattern however improving quality as sessions progressed.     Standing mini squat x 10 with Bue support  Standing calf raise x 15 with BUE support.     Motomed BLE G1 10 mins.     Pt requested return to bed. Stand pivot transfer from w/c to bed with MIN A and FWW. Pt remained in bed with all needs in reach and bed alarm on     Assessment    Pt tolerated session well with improving function noted as activity progressed. Pt with improved carryover of UE sequencing during sit to stand transfers.   Strengths: Able to follow instructions, Effective communication skills, Good insight into deficits/needs, Independent prior level of function, Motivated for self care and independence, Pleasant and cooperative, Supportive family, Willingly participates in therapeutic activities  Barriers: Decreased endurance, Generalized weakness, Impaired activity tolerance, Impaired balance, Limited mobility, Pain, Pain poorly managed    Plan    Continue to progress pt's functional independence to facilitate discharge       Passport items to be completed:  Get in/out of bed safely, in/out of a vehicle, safely use mobility device, walk or wheel around home/community, navigate up and  down stairs, show how to get up/down from the ground, ensure home is accessible, demonstrate HEP, complete caregiver training    Physical Therapy Problems (Active)       Problem: Balance       Dates: Start:  08/31/23         Goal: STG-Within one week, patient will maintain static standing 3min with unilateral UE support SPV       Dates: Start:  08/31/23               Problem: Mobility       Dates: Start:  08/31/23         Goal: STG-Within one week, patient will propel wheelchair community 400ft mod I indoors       Dates: Start:  08/31/23            Goal: STG-Within one week, patient will ambulate household distance 25ft x 4 with FWW SBA       Dates: Start:  08/31/23            Goal: STG-Within one week, patient will ambulate up/down a curb with FWW mod assist       Dates: Start:  08/31/23               Problem: Mobility Transfers       Dates: Start:  08/31/23         Goal: STG-Within one week, patient will transfer bed to chair SBA SPT (5 out of 5 trials)       Dates: Start:  08/31/23               Problem: PT-Long Term Goals       Dates: Start:  08/31/23         Goal: LTG-By discharge, patient will tolerate standing 5min at sink to complete ADLs mod I       Dates: Start:  08/31/23            Goal: LTG-By discharge, patient will ambulate 150ft x 4 with FWW mod I        Dates: Start:  08/31/23            Goal: LTG-By discharge, patient will transfer one surface to another mod I SPT safely and consistently       Dates: Start:  08/31/23            Goal: LTG-By discharge, patient will perform home exercise program seated/standing for LE strengthening to be done 3x/day in safe, independent home environment       Dates: Start:  08/31/23            Goal: LTG-By discharge, patient will up/down curb with FWW CGA       Dates: Start:  08/31/23

## 2023-09-01 NOTE — THERAPY
Occupational Therapy  Daily Treatment     Patient Name: Barbra العراقي  Age:  78 y.o., Sex:  female  Medical Record #: [id number]  Today's Date: 9/1/2023     Precautions  Precautions: Fall Risk, Weight Bearing As Tolerated Right Lower Extremity  Comments: wound vac R hip         Subjective    Patient reported feeling depressed and upset about her daughter not willing to care for patient's cat and was planning on taking it to the [facility].  She was agreeable to shower.     Objective       09/01/23 1031   OT Charge Group   OT Self Care / ADL (Units) 3   OT Therapy Activity (Units) 1   OT Total Time Spent   OT Individual Total Time Spent (Mins) 60   Precautions   Precautions Fall Risk;Weight Bearing As Tolerated Right Lower Extremity   Comments wound vac R hip   Pain 0 - 10 Group   Location Hip   Location Orientation Right   Therapist Pain Assessment During Activity   Functional Level of Assist   Grooming Supervision;Seated   Bathing Moderate Assist   Bathing Description Grab bar;Hand held shower;Tub bench;Set-up of equipment;Supervision for safety;Verbal cueing  (assist for feet and standing balance for bottom)   Lower Body Dressing Contact Guard Assist   Lower Body Dressing Description Reacher;Sock aid;Increased time;Set-up of equipment;Supervision for safety;Verbal cueing  (CGA with AE for 11/11 tasks)   Bed, Chair, Wheelchair Transfer Contact Guard Assist   Bed Chair Wheelchair Transfer Description Adaptive equipment;Set-up of equipment;Supervision for safety;Verbal cueing  (fww SPT)   Tub / Shower Transfers Moderate Assist   Tub Shower Transfer Description Grab bar;Shower bench;Requires lift;Set-up of equipment;Supervision for safety;Verbal cueing  (mod liting assist off bench)   Bed Mobility    Supine to Sit Standby Assist   Scooting Standby Assist   Interdisciplinary Plan of Care Collaboration   IDT Collaboration with  Nursing;Family / Caregiver   Patient Position at End of Therapy Seated;Chair Alarm On;Self  "Releasing Lap Belt Applied;Call Light within Reach;Tray Table within Reach;Phone within Reach;Family / Friend in Room   Collaboration Comments RN gave medicine; daughter arrived at end of session         Assessment    Patient demonstrated some improvements in ADL function today.  She could use more practice with AE for improved carryover.  Reported some nausea at end of session (BP okay) and stated it's been \"awhile\" since last BM.  Strengths: Able to follow instructions, Alert and oriented, Effective communication skills, Good insight into deficits/needs, Independent prior level of function, Motivated for self care and independence, Pleasant and cooperative, Supportive family, Willingly participates in therapeutic activities  Barriers: Decreased endurance, Constipation, Generalized weakness, Impaired balance, Limited mobility, Pain    Plan    ADLs w/ AE prn, IADLs when able, STS with less assist, functional mobility with FWW, strength/endurance, standing tolerance/balance    Occupational Therapy Goals (Active)       Problem: Dressing       Dates: Start:  08/31/23         Goal: STG-Within one week, patient will dress LB with max A using AE       Dates: Start:  08/31/23               Problem: Functional Transfers       Dates: Start:  08/31/23         Goal: STG-Within one week, patient will transfer to toilet with min A using grab bar and commode over toilet       Dates: Start:  08/31/23               Problem: OT Long Term Goals       Dates: Start:  08/31/23         Goal: LTG-By discharge, patient will complete basic self care tasks with mod I       Dates: Start:  08/31/23            Goal: LTG-By discharge, patient will perform bathroom transfers with mod I       Dates: Start:  08/31/23            Goal: LTG-By discharge, patient will complete basic home management with supervision/mod I       Dates: Start:  08/31/23               Problem: Toileting       Dates: Start:  08/31/23         Goal: STG-Within one week, " patient will complete toileting tasks with max A        Dates: Start:  08/31/23

## 2023-09-01 NOTE — PROGRESS NOTES
"  Physical Medicine & Rehabilitation Progress Note    Encounter Date: 9/1/2023    Chief Complaint: Feeling better    Interval Events (Subjective):  Low BP  Requiring catheterization  No BM since admission    Seen and examined in her room, she is tired, but feels better with Arellano out and with the antibiotics. Ritalin is working well for her. She is getting dizzy at times and this is bothersome. Does not have abdominal binder.     ROS: 14 point ROS negative unless otherwise specified in the HPI    Objective:  VITAL SIGNS: /71   Pulse 88   Temp 37.1 °C (98.8 °F) (Oral)   Resp 17   Ht 1.651 m (5' 5\")   Wt 61.7 kg (136 lb)   SpO2 94%   BMI 22.63 kg/m²     GEN: No apparent distress  HEENT: Head normocephalic, atraumatic.  Sclera nonicteric bilaterally, no ocular discharge appreciated bilaterally.  CV: Extremities warm and well-perfused, no peripheral edema appreciated bilaterally.  PULMONARY: Breathing nonlabored on room air, no respiratory accessory muscle use.  Not requiring supplemental oxygen.  SKIN: No appreciable skin breakdown on exposed areas of skin.  PSYCH: Mood and affect within normal limits.  NEURO: Awake alert.  Conversational.  Logical thought content.      Laboratory Values:  Recent Results (from the past 72 hour(s))   Basic Metabolic Panel    Collection Time: 08/30/23  8:38 AM   Result Value Ref Range    Sodium 137 135 - 145 mmol/L    Potassium 5.4 3.6 - 5.5 mmol/L    Chloride 106 96 - 112 mmol/L    Co2 22 20 - 33 mmol/L    Glucose 189 (H) 65 - 99 mg/dL    Bun 18 8 - 22 mg/dL    Creatinine 0.95 0.50 - 1.40 mg/dL    Calcium 7.8 (L) 8.5 - 10.5 mg/dL    Anion Gap 9.0 7.0 - 16.0   CBC WITH DIFFERENTIAL    Collection Time: 08/30/23  8:38 AM   Result Value Ref Range    WBC 9.4 4.8 - 10.8 K/uL    RBC 3.11 (L) 4.20 - 5.40 M/uL    Hemoglobin 9.3 (L) 12.0 - 16.0 g/dL    Hematocrit 29.0 (L) 37.0 - 47.0 %    MCV 93.2 81.4 - 97.8 fL    MCH 29.9 27.0 - 33.0 pg    MCHC 32.1 (L) 32.2 - 35.5 g/dL    RDW 45.5 " 35.9 - 50.0 fL    Platelet Count 228 164 - 446 K/uL    MPV 10.3 9.0 - 12.9 fL    Neutrophils-Polys 77.00 (H) 44.00 - 72.00 %    Lymphocytes 12.90 (L) 22.00 - 41.00 %    Monocytes 9.40 0.00 - 13.40 %    Eosinophils 0.00 0.00 - 6.90 %    Basophils 0.10 0.00 - 1.80 %    Immature Granulocytes 0.60 0.00 - 0.90 %    Nucleated RBC 0.00 0.00 - 0.20 /100 WBC    Neutrophils (Absolute) 7.21 1.82 - 7.42 K/uL    Lymphs (Absolute) 1.21 1.00 - 4.80 K/uL    Monos (Absolute) 0.88 (H) 0.00 - 0.85 K/uL    Eos (Absolute) 0.00 0.00 - 0.51 K/uL    Baso (Absolute) 0.01 0.00 - 0.12 K/uL    Immature Granulocytes (abs) 0.06 0.00 - 0.11 K/uL    NRBC (Absolute) 0.00 K/uL   ESTIMATED GFR    Collection Time: 08/30/23  8:38 AM   Result Value Ref Range    GFR (CKD-EPI) 60 >60 mL/min/1.73 m 2   URINALYSIS    Collection Time: 08/30/23  6:05 PM    Specimen: Urine, Arellano Cath   Result Value Ref Range    Color Yellow     Character Turbid (A)     Specific Gravity 1.017 <1.035    Ph 7.0 5.0 - 8.0    Glucose Negative Negative mg/dL    Ketones Trace (A) Negative mg/dL    Protein 300 (A) Negative mg/dL    Bilirubin Negative Negative    Urobilinogen, Urine 0.2 Negative    Nitrite Negative Negative    Leukocyte Esterase Large (A) Negative    Occult Blood Moderate (A) Negative    Micro Urine Req Microscopic    URINE MICROSCOPIC (W/UA)    Collection Time: 08/30/23  6:05 PM   Result Value Ref Range    WBC Packed (A) /hpf    RBC 20-50 (A) /hpf    Bacteria Few (A) None /hpf    Epithelial Cells Few /hpf    Amorphous Crystal Present /hpf    Hyaline Cast 0-2 /lpf   TSH with Reflex to FT4    Collection Time: 08/31/23  5:23 AM   Result Value Ref Range    TSH 1.490 0.380 - 5.330 uIU/mL   Magnesium    Collection Time: 08/31/23  5:23 AM   Result Value Ref Range    Magnesium 2.0 1.5 - 2.5 mg/dL   Phosphorus    Collection Time: 08/31/23  5:23 AM   Result Value Ref Range    Phosphorus 3.4 2.5 - 4.5 mg/dL   Comp Metabolic Panel (CMP)    Collection Time: 08/31/23  5:23 AM    Result Value Ref Range    Sodium 139 135 - 145 mmol/L    Potassium 4.5 3.6 - 5.5 mmol/L    Chloride 106 96 - 112 mmol/L    Co2 25 20 - 33 mmol/L    Anion Gap 8.0 7.0 - 16.0    Glucose 103 (H) 65 - 99 mg/dL    Bun 19 8 - 22 mg/dL    Creatinine 0.79 0.50 - 1.40 mg/dL    Calcium 8.0 (L) 8.5 - 10.5 mg/dL    Correct Calcium 8.8 8.5 - 10.5 mg/dL    AST(SGOT) 25 12 - 45 U/L    ALT(SGPT) 10 2 - 50 U/L    Alkaline Phosphatase 80 30 - 99 U/L    Total Bilirubin 0.2 0.1 - 1.5 mg/dL    Albumin 3.0 (L) 3.2 - 4.9 g/dL    Total Protein 5.3 (L) 6.0 - 8.2 g/dL    Globulin 2.3 1.9 - 3.5 g/dL    A-G Ratio 1.3 g/dL   CBC with Differential    Collection Time: 08/31/23  5:23 AM   Result Value Ref Range    WBC 9.0 4.8 - 10.8 K/uL    RBC 3.01 (L) 4.20 - 5.40 M/uL    Hemoglobin 8.8 (L) 12.0 - 16.0 g/dL    Hematocrit 28.2 (L) 37.0 - 47.0 %    MCV 93.7 81.4 - 97.8 fL    MCH 29.2 27.0 - 33.0 pg    MCHC 31.2 (L) 32.2 - 35.5 g/dL    RDW 46.4 35.9 - 50.0 fL    Platelet Count 218 164 - 446 K/uL    MPV 10.3 9.0 - 12.9 fL    Neutrophils-Polys 69.40 44.00 - 72.00 %    Lymphocytes 18.90 (L) 22.00 - 41.00 %    Monocytes 8.40 0.00 - 13.40 %    Eosinophils 0.80 0.00 - 6.90 %    Basophils 0.60 0.00 - 1.80 %    Immature Granulocytes 1.90 (H) 0.00 - 0.90 %    Nucleated RBC 0.00 0.00 - 0.20 /100 WBC    Neutrophils (Absolute) 6.28 1.82 - 7.42 K/uL    Lymphs (Absolute) 1.71 1.00 - 4.80 K/uL    Monos (Absolute) 0.76 0.00 - 0.85 K/uL    Eos (Absolute) 0.07 0.00 - 0.51 K/uL    Baso (Absolute) 0.05 0.00 - 0.12 K/uL    Immature Granulocytes (abs) 0.17 (H) 0.00 - 0.11 K/uL    NRBC (Absolute) 0.00 K/uL   ESTIMATED GFR    Collection Time: 08/31/23  5:23 AM   Result Value Ref Range    GFR (CKD-EPI) 75 >60 mL/min/1.73 m 2   HEMOGLOBIN A1C    Collection Time: 09/01/23  5:30 AM   Result Value Ref Range    Glycohemoglobin 5.8 (H) 4.0 - 5.6 %    Est Avg Glucose 120 mg/dL       Medications:  Scheduled Medications   Medication Dose Frequency    enoxaparin (LOVENOX) injection   40 mg DAILY AT 1800    ciprofloxacin  500 mg Q12HRS    Pharmacy Consult Request  1 Each PHARMACY TO DOSE    omeprazole  20 mg DAILY    ammonium lactate   BID    senna-docusate  2 Tablet BID    And    polyethylene glycol/lytes  1 Packet DAILY    melatonin  4.5 mg QHS     PRN medications: traMADol, traMADol, Respiratory Therapy Consult, hydrALAZINE, carboxymethylcellulose, benzocaine-menthol, mag hydrox-al hydrox-simeth, ondansetron **OR** ondansetron, traZODone, sodium chloride, acetaminophen, senna-docusate **AND** polyethylene glycol/lytes **AND** magnesium hydroxide **AND** bisacodyl    Diet:  Current Diet Order   Procedures    Diet Order Diet: Regular       Medical Decision Making and Plan:    Right Hip Per-Prosthetic Fracture s/p ORIF Dr. Galvan 8/29/23  PT and OT for mobility and ADLs. Per guidelines, 15 hours per week between PT, OT and/or SLP.  Follow-up Ortho  WBAT RLE  [place] on until outpatient follow up  Sutures out 10-14 days post-op     Pain  PRN Tylenol + Oxycodone     ABLA  Hgb drop 9.3-->8.8  Monitor     Depression  No anti-depressant at home     Hyperglycemia  Order Hgb A1c - 5.8  Monitor    Hypotension  Abdominal binder      ADHD   Add home Adderall XR 30mg daily when patient dtr brings.   9/1 Schedule Ritalin 10mg daily - qAM     Skin  Patient at risk for skin breakdown due to debility in areas including sacrum, achilles, elbows and head in addition to other sites. Nursing to assess skin daily.      GI Ppx  Patient on Prilosec for GERD prophylaxis.      Sleep  Melatonin     Bowel   Patient on Senna-docusate for constipation prophylaxis.   Last BM: 08/31/23     Bladder  + UTI on admission  IDFC - remove if no contraindication  Start Ciprofloxacin 8/31, continue 9/1 x 5 days     DVT PROPHYLAXIS: Lovenox 30mg SQ q12hrs on admission --> 40mg SQ daily  Switch to ASA 80mg nusrat on D/c per Ortho     HOSPITALIST FOLLOWING: No     CODE STATUS: FULL CODE     DISPO: Lives alone in 1 story apartment on  second floor. Elevator present. Daughter can help intermittently.   ____________________________________    Dr. Fauzia Jain DO, MS  [facility] - Physical Medicine & Rehabilitation   ____________________________________

## 2023-09-01 NOTE — PROGRESS NOTES
NURSING DAILY NOTE    Name: Barbra العراقي   Date of Admission: 8/30/2023   Admitting Diagnosis: Closed right hip fracture, initial encounter ([facility])  Attending Physician: Fauzia Jain D.o.  Allergies: Ampicillin and Keflex [cephalexin]    Safety  Patient Assist  modd to max  Patient Precautions  Fall Risk, Weight Bearing As Tolerated Right Lower Extremity  Precaution Comments  Torres, wound vac to R hip  Bed Transfer Status  Contact Guard Assist (lateral scoot technique)  Toilet Transfer Status   Moderate Assist  Assistive Devices  Gait Belt, Wheelchair  Oxygen  None - Room Air  Diet/Therapeutic Dining  Current Diet Order   Procedures    Diet Order Diet: Regular     Pill Administration  whole  Agitated Behavioral Scale     ABS Level of Severity       Fall Risk  Has the patient had a fall this admission?   No  Amie Wright Fall Risk Scoring  16, HIGH RISK  Fall Risk Safety Measures  bed alarm and poor balance    Vitals  Temperature: 36.8 °C (98.2 °F)  Temp src: Oral  Pulse: 92  Respiration: 18  Blood Pressure : 131/55  Blood Pressure MAP (Calculated): 80 MM HG  BP Location: Left, Upper Arm  Patient BP Position: Supine     Oxygen  Pulse Oximetry: 94 %  O2 (LPM): 0  O2 Delivery Device: None - Room Air    Bowel and Bladder  Last Bowel Movement  08/30/23  Stool Type     Bowel Device     Continent  Bladder: Did not void (torres in place)   Bowel: Continent movement  Bladder Function  Urine Void (mL):  (small)  Urine Color: Steph  Urine Clarity: Cloudy (Unable to see Through)  Straight Catheter: 400 ml  Genitourinary Assessment   Bladder Assessment (WDL):  WDL Except  Torres Catheter: Present with Active Order  Torres Reasons per MD Order: Acute urinary retention or bladder outlet obstruction  Torres Care: Given with Soap and Water  Urine Color: Steph  Urine Clarity: Cloudy (Unable to see Through)  Bladder Device: Other Bladder Device (Comment)  Bladder Scan: Post  Void  $ Bladder Scan Results (mL): 407  Bladder Medications: No    Skin  Escobar Score   16  Sensory Interventions   Bed Types: Standard/Trauma Mattress with Overlay  Skin Preventative Measures: Pillows in Use for Support / Positioning  Moisture Interventions  Moisturizers/Barriers: Barrier Wipes, Barrier Paste      Pain  Pain Rating Scale  7 - Focus of attention, prevents doing daily activities  Pain Location  Hip  Pain Location Orientation  Right  Pain Interventions   Repositioned    ADLs    Bathing   Patient Refused Bathing (pt refused shower)  Linen Change      Personal Hygiene  Moist Yaritza Wipes, Perineal Care  Chlorhexidine Bath      Oral Care  Brushed Teeth  Teeth/Dentures  Missing Teeth (Comments)  Shave     Nutrition Percentage Eaten  *  * Meal *  *, Dinner, Between 50-75% Consumed  Environmental Precautions  Bed in Low Position, Treaded Slipper Socks on Patient  Patient Turns/Positioning  Patient Turns Self from Side to Side  Patient Turns Assistance/Tolerance  Assistance of One  Bed Positions  Bed Controls On  Head of Bed Elevated  Self regulated      Psychosocial/Neurologic Assessment  Psychosocial Assessment  Psychosocial (WDL):  WDL Except  Patient Behaviors: Fatigue  Neurologic Assessment  Neuro (WDL): Exceptions to WDL  Level of Consciousness: Alert  Orientation Level: Oriented X4  Cognition: Follows commands, Appropriate attention/concentration  Speech: Clear  Motor Function/Sensation Assessment: Motor strength  Muscle Strength Right Arm: Good Strength Against Gravity and Moderate Resistance  Muscle Strength Left Arm: Good Strength Against Gravity and Moderate Resistance  Muscle Strength Right Leg: Fair Strength against Gravity but No Resistance  Muscle Strength Left Leg: Fair Strength against Gravity but No Resistance  EENT (WDL):  WDL Except    Cardio/Pulmonary Assessment  Edema      Respiratory Breath Sounds  RUL Breath Sounds: Clear  RML Breath Sounds: Diminished  RLL Breath Sounds:  Diminished  AZ Breath Sounds: Clear  LLL Breath Sounds: Diminished  Cardiac Assessment   Cardiac (WDL):  Within Defined Limits

## 2023-09-02 ENCOUNTER — APPOINTMENT (OUTPATIENT)
Dept: OCCUPATIONAL THERAPY | Facility: REHABILITATION | Age: 78
DRG: 560 | End: 2023-09-02
Attending: PHYSICAL MEDICINE & REHABILITATION
Payer: MEDICARE

## 2023-09-02 ENCOUNTER — APPOINTMENT (OUTPATIENT)
Dept: PHYSICAL THERAPY | Facility: REHABILITATION | Age: 78
DRG: 560 | End: 2023-09-02
Attending: PHYSICAL MEDICINE & REHABILITATION
Payer: MEDICARE

## 2023-09-02 LAB
BACTERIA UR CULT: ABNORMAL
BACTERIA UR CULT: ABNORMAL
SIGNIFICANT IND 70042: ABNORMAL
SITE SITE: ABNORMAL
SOURCE SOURCE: ABNORMAL

## 2023-09-02 PROCEDURE — 97110 THERAPEUTIC EXERCISES: CPT

## 2023-09-02 PROCEDURE — 97535 SELF CARE MNGMENT TRAINING: CPT

## 2023-09-02 PROCEDURE — 700102 HCHG RX REV CODE 250 W/ 637 OVERRIDE(OP): Performed by: PHYSICAL MEDICINE & REHABILITATION

## 2023-09-02 PROCEDURE — A9270 NON-COVERED ITEM OR SERVICE: HCPCS | Performed by: PHYSICAL MEDICINE & REHABILITATION

## 2023-09-02 PROCEDURE — 770010 HCHG ROOM/CARE - REHAB SEMI PRIVAT*

## 2023-09-02 PROCEDURE — 97530 THERAPEUTIC ACTIVITIES: CPT

## 2023-09-02 PROCEDURE — 700111 HCHG RX REV CODE 636 W/ 250 OVERRIDE (IP): Mod: JZ | Performed by: PHYSICAL MEDICINE & REHABILITATION

## 2023-09-02 RX ADMIN — CIPROFLOXACIN 500 MG: 500 TABLET, FILM COATED ORAL at 09:00

## 2023-09-02 RX ADMIN — TRAMADOL HYDROCHLORIDE 50 MG: 50 TABLET ORAL at 08:24

## 2023-09-02 RX ADMIN — ONDANSETRON 4 MG: 4 TABLET, ORALLY DISINTEGRATING ORAL at 07:53

## 2023-09-02 RX ADMIN — METHYLPHENIDATE HYDROCHLORIDE 10 MG: 5 TABLET ORAL at 08:20

## 2023-09-02 RX ADMIN — TRAMADOL HYDROCHLORIDE 50 MG: 50 TABLET ORAL at 14:06

## 2023-09-02 RX ADMIN — ENOXAPARIN SODIUM 40 MG: 100 INJECTION SUBCUTANEOUS at 17:39

## 2023-09-02 RX ADMIN — SENNOSIDES AND DOCUSATE SODIUM 2 TABLET: 50; 8.6 TABLET ORAL at 08:20

## 2023-09-02 RX ADMIN — Medication 4.5 MG: at 21:19

## 2023-09-02 RX ADMIN — ONDANSETRON 4 MG: 4 TABLET, ORALLY DISINTEGRATING ORAL at 14:05

## 2023-09-02 RX ADMIN — OMEPRAZOLE 20 MG: 20 CAPSULE, DELAYED RELEASE ORAL at 08:20

## 2023-09-02 RX ADMIN — ALUMINUM HYDROXIDE, MAGNESIUM HYDROXIDE, AND DIMETHICONE 20 ML: 400; 400; 40 SUSPENSION ORAL at 17:44

## 2023-09-02 RX ADMIN — Medication: at 09:48

## 2023-09-02 RX ADMIN — POLYETHYLENE GLYCOL 3350 1 PACKET: 17 POWDER, FOR SOLUTION ORAL at 08:22

## 2023-09-02 RX ADMIN — TRAMADOL HYDROCHLORIDE 25 MG: 50 TABLET ORAL at 21:19

## 2023-09-02 RX ADMIN — Medication: at 21:32

## 2023-09-02 RX ADMIN — CIPROFLOXACIN 500 MG: 500 TABLET, FILM COATED ORAL at 21:19

## 2023-09-02 RX ADMIN — SENNOSIDES AND DOCUSATE SODIUM 2 TABLET: 50; 8.6 TABLET ORAL at 21:19

## 2023-09-02 RX ADMIN — ALUMINUM HYDROXIDE, MAGNESIUM HYDROXIDE, AND DIMETHICONE 20 ML: 400; 400; 40 SUSPENSION ORAL at 09:48

## 2023-09-02 ASSESSMENT — PAIN DESCRIPTION - PAIN TYPE
TYPE: ACUTE PAIN

## 2023-09-02 ASSESSMENT — PATIENT HEALTH QUESTIONNAIRE - PHQ9
2. FEELING DOWN, DEPRESSED, IRRITABLE, OR HOPELESS: NOT AT ALL
1. LITTLE INTEREST OR PLEASURE IN DOING THINGS: NOT AT ALL
SUM OF ALL RESPONSES TO PHQ9 QUESTIONS 1 AND 2: 0

## 2023-09-02 ASSESSMENT — ACTIVITIES OF DAILY LIVING (ADL): BED_CHAIR_WHEELCHAIR_TRANSFER_DESCRIPTION: ADAPTIVE EQUIPMENT;SET-UP OF EQUIPMENT;SUPERVISION FOR SAFETY

## 2023-09-02 NOTE — CARE PLAN
"  Problem: Fall Risk - Rehab  Goal: Patient will remain free from falls  Outcome: Progressing   Amie Wright Fall risk Assessment Score: 16    High fall risk Interventions   - Alarming seatbelt  - Wander guard  - Bed and strip alarm   - Yellow sign by the door   - Yellow wrist band \"Fall risk\"  - Room near to the nurse station  - Do not leave patient unattended in the bathroom  - Fall risk education provided                    Problem: Pain - Standard  Goal: Alleviation of pain or a reduction in pain to the patient’s comfort goal  Outcome: Progressing         "

## 2023-09-02 NOTE — THERAPY
Physical Therapy   Daily Treatment     Patient Name: Barbra العراقي  Age:  78 y.o., Sex:  female  Medical Record #: [id number]  Today's Date: 9/2/2023     Precautions  Precautions: Fall Risk, Weight Bearing As Tolerated Right Lower Extremity  Comments: wound vac R hip    Subjective    Patient is in bed, reports significant nausea. Willing to work on supine exercises and progress to seated/ standing as able.      Objective       09/02/23 1301   PT Charge Group   PT Therapeutic Exercise (Units) 4   PT Total Time Spent   PT Individual Total Time Spent (Mins) 60   Supine Lower Body Exercise   Hip Abduction 2 sets of 10;Hook Lying;Bilateral   Straight Leg Raises Front;2 sets of 10;Bilateral   Heel Slide 2 sets of 10;Bilateral   Ankle Pumps 2 sets of 10;Bilateral   Gluteal Isometrics 2 sets of 10;Bilateral   Quadriceps Isometrics 2 sets of 10;Bilateral   Sitting Lower Body Exercises   Ankle Pumps 2 sets of 10;Bilateral   Hip Abduction 2 sets of 10;Bilateral   Long Arc Quad 2 sets of 10;Bilateral   Marching 2 sets of 10   Interdisciplinary Plan of Care Collaboration   IDT Collaboration with  Nursing   Patient Position at End of Therapy Call Light within Reach;Tray Table within Reach;Phone within Reach;In Bed   Collaboration Comments RN providing pain/ nausea meds         Assessment    Patient was unable to address sit to stand due to nausea/ lightheadedness, tolerated seated exercises with frequent breaks. Due to history of hypotension, did not measure BP and progressed activity based on symptoms. Was returned to supine when patient was unable to continue.     Strengths: Able to follow instructions, Effective communication skills, Good insight into deficits/needs, Independent prior level of function, Motivated for self care and independence, Pleasant and cooperative, Supportive family, Willingly participates in therapeutic activities  Barriers: Decreased endurance, Generalized weakness, Impaired activity tolerance, Impaired  balance, Limited mobility, Pain, Pain poorly managed    Plan    Continue to progress bed mobility with use of leg , STS/transfer training, ambulation, LE strengthening       Passport items to be completed:  Get in/out of bed safely, in/out of a vehicle, safely use mobility device, walk or wheel around home/community, navigate up and down stairs, show how to get up/down from the ground, ensure home is accessible, demonstrate HEP, complete caregiver training    Physical Therapy Problems (Active)       Problem: Balance       Dates: Start:  08/31/23         Goal: STG-Within one week, patient will maintain static standing 3min with unilateral UE support SPV       Dates: Start:  08/31/23               Problem: Mobility       Dates: Start:  08/31/23         Goal: STG-Within one week, patient will propel wheelchair community 400ft mod I indoors       Dates: Start:  08/31/23            Goal: STG-Within one week, patient will ambulate household distance 25ft x 4 with FWW SBA       Dates: Start:  08/31/23            Goal: STG-Within one week, patient will ambulate up/down a curb with FWW mod assist       Dates: Start:  08/31/23               Problem: Mobility Transfers       Dates: Start:  08/31/23         Goal: STG-Within one week, patient will transfer bed to chair SBA SPT (5 out of 5 trials)       Dates: Start:  08/31/23               Problem: PT-Long Term Goals       Dates: Start:  08/31/23         Goal: LTG-By discharge, patient will tolerate standing 5min at sink to complete ADLs mod I       Dates: Start:  08/31/23            Goal: LTG-By discharge, patient will ambulate 150ft x 4 with FWW mod I        Dates: Start:  08/31/23            Goal: LTG-By discharge, patient will transfer one surface to another mod I SPT safely and consistently       Dates: Start:  08/31/23            Goal: LTG-By discharge, patient will perform home exercise program seated/standing for LE strengthening to be done 3x/day in safe,  independent home environment       Dates: Start:  08/31/23            Goal: LTG-By discharge, patient will up/down curb with FWW CGA       Dates: Start:  08/31/23

## 2023-09-02 NOTE — PROGRESS NOTES
NURSING DAILY NOTE    Name: Barbra العراقي   Date of Admission: 8/30/2023   Admitting Diagnosis: Closed right hip fracture, initial encounter ([facility])  Attending Physician: Fauzia Jain D.o.  Allergies: Ampicillin and Keflex [cephalexin]    Safety  Patient Assist  mod to max  Patient Precautions  Fall Risk, Weight Bearing As Tolerated Right Lower Extremity  Precaution Comments  wound vac R hip  Bed Transfer Status  Contact Guard Assist  Toilet Transfer Status   Moderate Assist  Assistive Devices  Gait Belt, Wheelchair  Oxygen  None - Room Air  Diet/Therapeutic Dining  Current Diet Order   Procedures    Diet Order Diet: Regular     Pill Administration  whole  Agitated Behavioral Scale     ABS Level of Severity       Fall Risk  Has the patient had a fall this admission?   No  Amie Wright Fall Risk Scoring  16, HIGH RISK  Fall Risk Safety Measures  bed alarm, chair alarm, and poor balance    Vitals  Temperature: 37.1 °C (98.8 °F)  Temp src: Oral  Pulse: 83  Respiration: 18  Blood Pressure : 104/71  Blood Pressure MAP (Calculated): 82 MM HG  BP Location: Right, Upper Arm  Patient BP Position: Supine     Oxygen  Pulse Oximetry: 93 %  O2 (LPM): 0  O2 Delivery Device: None - Room Air    Bowel and Bladder  Last Bowel Movement  09/01/23  Stool Type  Type 5: Soft blob with clear cut edges (passed easily)  Bowel Device     Continent  Bladder: Did not void (torres in place)   Bowel: Continent movement  Bladder Function  Urine Void (mL):  (moderate)  Number of Times Voided: 1  Urine Color: Yellow  Urine Clarity: Clear  Number of Times Incontinent of Urine: 1  Straight Catheter: 500 ml  Genitourinary Assessment   Bladder Assessment (WDL):  WDL Except  Torres Catheter: Present with Active Order  Torres Reasons per MD Order: Acute urinary retention or bladder outlet obstruction  Torres Care: Given with Soap and Water  Urine Color: Yellow  Urine Clarity: Clear  Number of Times  Incontinent of Urine: 1  Bladder Device:  (ICP)  Bladder Scan: Post Void  $ Bladder Scan Results (mL): 541  Bladder Medications: No    Skin  Escobar Score   16  Sensory Interventions   Bed Types: Standard/Trauma Mattress with Overlay  Skin Preventative Measures: Pillows in Use for Support / Positioning  Moisture Interventions  Moisturizers/Barriers: Barrier Wipes, Barrier Paste      Pain  Pain Rating Scale  0 - No Pain  Pain Location  Hip  Pain Location Orientation  Right  Pain Interventions   Declines    ADLs    Bathing   Patient Refused Bathing (pt refused shower)  Linen Change      Personal Hygiene  Change Yaritza Pads, Moist Yaritza Wipes, Perineal Care  Chlorhexidine Bath      Oral Care  Brushed Teeth  Teeth/Dentures  Missing Teeth (Comments)  Shave     Nutrition Percentage Eaten  *  * Meal *  *, Dinner, Between 50-75% Consumed  Environmental Precautions  Treaded Slipper Socks on Patient, Bed in Low Position  Patient Turns/Positioning  Patient Turns Self from Side to Side  Patient Turns Assistance/Tolerance  Assistance of One  Bed Positions  Bed Controls On  Head of Bed Elevated  Self regulated      Psychosocial/Neurologic Assessment  Psychosocial Assessment  Psychosocial (WDL):  WDL Except  Patient Behaviors: Fatigue  Neurologic Assessment  Neuro (WDL): Exceptions to WDL  Level of Consciousness: Alert  Orientation Level: Oriented X4  Cognition: Follows commands, Appropriate attention/concentration  Speech: Clear  Motor Function/Sensation Assessment: Motor strength  Muscle Strength Right Arm: Good Strength Against Gravity and Moderate Resistance  Muscle Strength Left Arm: Good Strength Against Gravity and Moderate Resistance  Muscle Strength Right Leg: Fair Strength against Gravity but No Resistance  Muscle Strength Left Leg: Fair Strength against Gravity but No Resistance  Neuro Additional Assessments: David Coma Scale  EENT (WDL):  WDL Except    Cardio/Pulmonary Assessment  Edema      Respiratory Breath  Sounds  RUL Breath Sounds: Clear  RML Breath Sounds: Diminished  RLL Breath Sounds: Diminished  AZ Breath Sounds: Clear  LLL Breath Sounds: Diminished  Cardiac Assessment   Cardiac (WDL):  Within Defined Limits

## 2023-09-02 NOTE — CARE PLAN
"  Problem: Skin Integrity  Goal: Patient's skin integrity will be maintained or improve  Outcome: Progressing  Note: Right hip incision lines with wound vac dressing  in place.Cont monitored.     Problem: Fall Risk - Rehab  Goal: Patient will remain free from falls  Outcome: Progressing  Note: Amie Wright Fall risk Assessment Score: 16    High fall risk Interventions   - Bed and strip alarm   - Yellow sign by the door   - Yellow wrist band \"Fall risk\"  - Room near to the nurse station  - Do not leave patient unattended in the bathroom  - Fall risk education provided     Problem: Pain - Standard  Goal: Alleviation of pain or a reduction in pain to the patient’s comfort goal  Outcome: Progressing  Note: Assessed for pain and discomfort ,pain under control, needs anticipated and attended.   The patient is Stable - Low risk of patient condition declining or worsening    Shift Goals  Clinical Goals: Safety  Patient Goals: Rest    Progress made toward(s) clinical / shift goals:  Pt free from fall and injury.      "

## 2023-09-02 NOTE — THERAPY
Occupational Therapy  Daily Treatment     Patient Name: Barbra العراقي  Age:  78 y.o., Sex:  female  Medical Record #: [id number]  Today's Date: 9/2/2023     Precautions  Precautions: Fall Risk, Weight Bearing As Tolerated Right Lower Extremity  Comments: wound vac R hip         Subjective    Patient perseverated throughout session on feeling of nausea, inability to burp.  She was in bed upon arrival and agreeable to OT.     Objective       09/02/23 0831   OT Charge Group   OT Self Care / ADL (Units) 1   OT Therapy Activity (Units) 2   OT Therapeutic Exercise (Units) 1   OT Total Time Spent   OT Individual Total Time Spent (Mins) 60   Precautions   Precautions Fall Risk;Weight Bearing As Tolerated Right Lower Extremity   Vitals   Pulse 88   Patient BP Position Sitting   Blood Pressure  134/63   Pain 0 - 10 Group   Location Hip   Location Orientation Right   Therapist Pain Assessment During Activity;6   Functional Level of Assist   Grooming Supervision;Seated   Bed, Chair, Wheelchair Transfer Contact Guard Assist   Bed Chair Wheelchair Transfer Description Adaptive equipment;Set-up of equipment;Supervision for safety  (FWW)   Sitting Lower Body Exercises   Sitting Lower Body Exercises Yes   Nustep Time (See Comments)  (nustep machine level 2 x 9 minutes with B UE/LE with very slow pace and within small ROM)   Bed Mobility    Supine to Sit Standby Assist  (with leg )   Sit to Supine Standby Assist  (with leg  and VCs)   Scooting Standby Assist   Skilled Intervention Verbal Cuing   Interdisciplinary Plan of Care Collaboration   IDT Collaboration with  Nursing   Patient Position at End of Therapy In Bed;Call Light within Reach;Tray Table within Reach;Phone within Reach;Bed Alarm On   Collaboration Comments informed RN of good BP reading in sitting and c/o nausea/dizziness     SPT with FWW with CGA from nustep <> w/c.    Assessment    Patient tolerated session fair.  She felt good for first 45 minutes of session  with BP in good range.  After use of nustep, she reported dizziness. (BP not low).  Reported nausea throughout session and inability to burp.  She was not due for more Zofran.    Strengths: Able to follow instructions, Alert and oriented, Effective communication skills, Good insight into deficits/needs, Independent prior level of function, Motivated for self care and independence, Pleasant and cooperative, Supportive family, Willingly participates in therapeutic activities  Barriers: Decreased endurance, Constipation, Generalized weakness, Impaired balance, Limited mobility, Pain    Plan    ADLs w/ AE prn, IADLs when able, functional mobility with FWW, strength/endurance, standing tolerance/balance    Occupational Therapy Goals (Active)       Problem: Dressing       Dates: Start:  08/31/23         Goal: STG-Within one week, patient will dress LB with max A using AE       Dates: Start:  08/31/23               Problem: Functional Transfers       Dates: Start:  08/31/23         Goal: STG-Within one week, patient will transfer to toilet with min A using grab bar and commode over toilet       Dates: Start:  08/31/23               Problem: OT Long Term Goals       Dates: Start:  08/31/23         Goal: LTG-By discharge, patient will complete basic self care tasks with mod I       Dates: Start:  08/31/23            Goal: LTG-By discharge, patient will perform bathroom transfers with mod I       Dates: Start:  08/31/23            Goal: LTG-By discharge, patient will complete basic home management with supervision/mod I       Dates: Start:  08/31/23               Problem: Toileting       Dates: Start:  08/31/23         Goal: STG-Within one week, patient will complete toileting tasks with max A        Dates: Start:  08/31/23

## 2023-09-02 NOTE — PROGRESS NOTES
Received shift report and assumed care of patient.  Patient asleep at this time.  Will continue to monitor.

## 2023-09-02 NOTE — CARE PLAN
"  Problem: Fall Risk - Rehab  Goal: Patient will remain free from falls  Outcome: Progressing     Amie Wright Fall risk Assessment Score: 16    High fall risk Interventions   - Alarming seatbelt  - Wander guard  - Bed and strip alarm   - Yellow sign by the door   - Yellow wrist band \"Fall risk\"  - Room near to the nurse station  - Do not leave patient unattended in the bathroom  - Fall risk education provided            Problem: Pain - Standard  Goal: Alleviation of pain or a reduction in pain to the patient’s comfort goal  Outcome: Progressing  Patient is able to rate pain on a scale of 1-10.          "

## 2023-09-03 ENCOUNTER — APPOINTMENT (OUTPATIENT)
Dept: OCCUPATIONAL THERAPY | Facility: REHABILITATION | Age: 78
DRG: 560 | End: 2023-09-03
Attending: PHYSICAL MEDICINE & REHABILITATION
Payer: MEDICARE

## 2023-09-03 PROCEDURE — 700102 HCHG RX REV CODE 250 W/ 637 OVERRIDE(OP): Performed by: PHYSICAL MEDICINE & REHABILITATION

## 2023-09-03 PROCEDURE — 700111 HCHG RX REV CODE 636 W/ 250 OVERRIDE (IP): Mod: JZ | Performed by: PHYSICAL MEDICINE & REHABILITATION

## 2023-09-03 PROCEDURE — 97535 SELF CARE MNGMENT TRAINING: CPT

## 2023-09-03 PROCEDURE — 770010 HCHG ROOM/CARE - REHAB SEMI PRIVAT*

## 2023-09-03 PROCEDURE — 99232 SBSQ HOSP IP/OBS MODERATE 35: CPT | Performed by: PHYSICAL MEDICINE & REHABILITATION

## 2023-09-03 PROCEDURE — 97110 THERAPEUTIC EXERCISES: CPT

## 2023-09-03 PROCEDURE — A9270 NON-COVERED ITEM OR SERVICE: HCPCS | Performed by: PHYSICAL MEDICINE & REHABILITATION

## 2023-09-03 RX ORDER — CIPROFLOXACIN 500 MG/1
500 TABLET, FILM COATED ORAL EVERY 12 HOURS
Status: COMPLETED | OUTPATIENT
Start: 2023-09-03 | End: 2023-09-07

## 2023-09-03 RX ADMIN — ENOXAPARIN SODIUM 40 MG: 100 INJECTION SUBCUTANEOUS at 18:09

## 2023-09-03 RX ADMIN — TRAMADOL HYDROCHLORIDE 50 MG: 50 TABLET ORAL at 08:23

## 2023-09-03 RX ADMIN — SENNOSIDES AND DOCUSATE SODIUM 2 TABLET: 50; 8.6 TABLET ORAL at 20:44

## 2023-09-03 RX ADMIN — Medication: at 20:52

## 2023-09-03 RX ADMIN — TRAMADOL HYDROCHLORIDE 50 MG: 50 TABLET ORAL at 14:05

## 2023-09-03 RX ADMIN — Medication 4.5 MG: at 20:44

## 2023-09-03 RX ADMIN — POLYETHYLENE GLYCOL 3350 1 PACKET: 17 POWDER, FOR SOLUTION ORAL at 08:22

## 2023-09-03 RX ADMIN — Medication: at 09:00

## 2023-09-03 RX ADMIN — OMEPRAZOLE 20 MG: 20 CAPSULE, DELAYED RELEASE ORAL at 08:22

## 2023-09-03 RX ADMIN — SENNOSIDES AND DOCUSATE SODIUM 2 TABLET: 50; 8.6 TABLET ORAL at 08:22

## 2023-09-03 RX ADMIN — CIPROFLOXACIN 500 MG: 500 TABLET, FILM COATED ORAL at 08:27

## 2023-09-03 RX ADMIN — CIPROFLOXACIN 500 MG: 500 TABLET, FILM COATED ORAL at 20:43

## 2023-09-03 ASSESSMENT — ACTIVITIES OF DAILY LIVING (ADL)
TOILET_TRANSFER_DESCRIPTION: GRAB BAR;INCREASED TIME;INITIAL PREPARATION FOR TASK;SET-UP OF EQUIPMENT;SUPERVISION FOR SAFETY
BED_CHAIR_WHEELCHAIR_TRANSFER_DESCRIPTION: ADAPTIVE EQUIPMENT;INCREASED TIME;INITIAL PREPARATION FOR TASK;SET-UP OF EQUIPMENT;SUPERVISION FOR SAFETY;VERBAL CUEING

## 2023-09-03 ASSESSMENT — PAIN DESCRIPTION - PAIN TYPE
TYPE: ACUTE PAIN
TYPE: ACUTE PAIN

## 2023-09-03 NOTE — THERAPY
"Occupational Therapy  Daily Treatment     Patient Name: Barbra العراقي  Age:  78 y.o., Sex:  female  Medical Record #: [id number]  Today's Date: 9/3/2023     Precautions  Precautions: (P) Fall Risk, Weight Bearing As Tolerated Right Lower Extremity  Comments: (P) wound vac R hip         Subjective    \"I think I would like to try to go to the bathroom.\"     Objective       09/03/23 0831   OT Charge Group   Charges Yes   OT Self Care / ADL (Units) 3   OT Therapeutic Exercise (Units) 1   OT Total Time Spent   OT Individual Total Time Spent (Mins) 60   Precautions   Precautions Fall Risk;Weight Bearing As Tolerated Right Lower Extremity   Comments wound vac R hip   Vitals   Vitals Comments VSS on RA   Pain   Pain Scales 0 to 10 Scale    Pain 0 - 10 Group   Location Hip   Location Orientation Right   Pain Rating Scale (NPRS) 2   Cognition    Cognition / Consciousness WDL   Level of Consciousness Alert   Functional Level of Assist   Grooming Supervision;Seated  (hair management, hand hygiene, oral hygiene)   Grooming Description Set-up of equipment;Seated in wheelchair at sink   Upper Body Dressing Supervision  (donned/doffed shirt while sitting upright in long sit in bed)   Upper Body Dressing Description Supervision for safety   Lower Body Dressing Minimal Assist   Lower Body Dressing Description Assist with threading into pant leg  (assist for threading wound vac tubing through pantleg, able to pull up and manage around waist)   Toileting Moderate Assist   Toileting Description Assist for standing balance;Assist for hygiene;Increased time;Set-up of equipment;Supervision for safety;Verbal cueing   Bed, Chair, Wheelchair Transfer Contact Guard Assist  (bed>w/c>toilet>w/c>nustep>w/c>bed. CGA to Min with fatigue, SPT with 2ww)   Bed Chair Wheelchair Transfer Description Adaptive equipment;Increased time;Initial preparation for task;Set-up of equipment;Supervision for safety;Verbal cueing   Toilet Transfers Minimal Assist "   Toilet Transfer Description Grab bar;Increased time;Initial preparation for task;Set-up of equipment;Supervision for safety   Interdisciplinary Plan of Care Collaboration   IDT Collaboration with  Nursing   Patient Position at End of Therapy In Bed;Bed Alarm On;Call Light within Reach;Tray Table within Reach   Collaboration Comments CLOF/disposition   Strengths & Barriers   Strengths Able to follow instructions;Alert and oriented;Effective communication skills;Good insight into deficits/needs;Independent prior level of function;Motivated for self care and independence;Pleasant and cooperative;Supportive family;Willingly participates in therapeutic activities   Barriers Decreased endurance;Constipation;Generalized weakness;Impaired balance;Limited mobility;Pain     Therex: NuStep for ~5 minutes on level 1 resistance. Increased time for task 2/2 slow moving/pace for endorsed tightness in hip/knee area on RLE.    Assessment    Pt with good tolerance to OT treatment focused on ADLs and therex. Pt denies any n/v symptoms that she complained about in past. She is fearful of movement and demos (+) hypokinesia. CGA progressing to Min A for transfers and verbal cueing still needed for safety.     Strengths: (P) Able to follow instructions, Alert and oriented, Effective communication skills, Good insight into deficits/needs, Independent prior level of function, Motivated for self care and independence, Pleasant and cooperative, Supportive family, Willingly participates in therapeutic activities  Barriers: (P) Decreased endurance, Constipation, Generalized weakness, Impaired balance, Limited mobility, Pain    Plan    ADLs w/ AE prn, IADLs when able, functional mobility with FWW, strength/endurance, standing tolerance/balance    Occupational Therapy Goals (Active)       Problem: Dressing       Dates: Start:  08/31/23         Goal: STG-Within one week, patient will dress LB with max A using AE       Dates: Start:  08/31/23     Expected End:  09/22/23               Problem: Functional Transfers       Dates: Start:  08/31/23         Goal: STG-Within one week, patient will transfer to toilet with min A using grab bar and commode over toilet       Dates: Start:  08/31/23    Expected End:  09/22/23               Problem: OT Long Term Goals       Dates: Start:  08/31/23         Goal: LTG-By discharge, patient will complete basic self care tasks with mod I       Dates: Start:  08/31/23    Expected End:  09/22/23            Goal: LTG-By discharge, patient will perform bathroom transfers with mod I       Dates: Start:  08/31/23    Expected End:  09/22/23            Goal: LTG-By discharge, patient will complete basic home management with supervision/mod I       Dates: Start:  08/31/23    Expected End:  09/22/23               Problem: Toileting       Dates: Start:  08/31/23         Goal: STG-Within one week, patient will complete toileting tasks with max A        Dates: Start:  08/31/23    Expected End:  09/22/23

## 2023-09-03 NOTE — PROGRESS NOTES
NURSING DAILY NOTE    Name: Barbra العراقي   Date of Admission: 8/30/2023   Admitting Diagnosis: Closed right hip fracture, initial encounter ([facility])  Attending Physician: Fauzia Jain D.o.  Allergies: Ampicillin and Keflex [cephalexin]    Safety  Patient Assist  mod to max  Patient Precautions  Fall Risk, Weight Bearing As Tolerated Right Lower Extremity  Precaution Comments  wound vac R hip  Bed Transfer Status  Contact Guard Assist  Toilet Transfer Status   Moderate Assist  Assistive Devices  Gait Belt, Rails, Wheelchair  Oxygen  None - Room Air  Diet/Therapeutic Dining  Current Diet Order   Procedures    Diet Order Diet: Regular     Pill Administration  whole  Agitated Behavioral Scale     ABS Level of Severity       Fall Risk  Has the patient had a fall this admission?   No  Amie Wright Fall Risk Scoring  16, HIGH RISK  Fall Risk Safety Measures  bed alarm and chair alarm    Vitals  Temperature: 36.6 °C (97.9 °F)  Temp src: Oral  Pulse: 76  Respiration: 18  Blood Pressure : 136/64  Blood Pressure MAP (Calculated): 88 MM HG  BP Location: Right, Upper Arm  Patient BP Position: Supine     Oxygen  Pulse Oximetry: 93 %  O2 (LPM): 0  O2 Delivery Device: None - Room Air    Bowel and Bladder  Last Bowel Movement  09/01/23  Stool Type  Type 5: Soft blob with clear cut edges (passed easily)  Bowel Device     Continent  Bladder: Did not void (torres in place)   Bowel: Continent movement  Bladder Function  Urine Void (mL):  (moderate)  Number of Times Voided: 1  Urine Color: Yellow  Urine Clarity: Clear  Number of Times Incontinent of Urine: 1  Straight Catheter: 300 ml  Genitourinary Assessment   Bladder Assessment (WDL):  WDL Except  Torres Catheter: Present with Active Order  Torres Reasons per MD Order: Acute urinary retention or bladder outlet obstruction  Torres Care: Given with Soap and Water  Urinary Elimination: Incontinence  Urine Color: Yellow  Urine  Clarity: Clear  Number of Times Incontinent of Urine: 1  Bladder Device: Diaper  Bladder Scan: Unable To Void  $ Bladder Scan Results (mL): 228  Bladder Medications: No    Skin  Escobar Score   16  Sensory Interventions   Bed Types: Standard/Trauma Mattress  Skin Preventative Measures: Pillows in Use for Support / Positioning  Moisture Interventions  Moisturizers/Barriers: Barrier Paste      Pain  Pain Rating Scale  0 - No Pain  Pain Location  Hip  Pain Location Orientation  Right  Pain Interventions   Medication (see MAR)    ADLs    Bathing   Patient Refused Bathing (pt refused shower)  Linen Change      Personal Hygiene  Change Yaritza Pads, Moist Yaritza Wipes, Perineal Care  Chlorhexidine Bath      Oral Care  Brushed Teeth  Teeth/Dentures  Missing Teeth (Comments)  Shave     Nutrition Percentage Eaten  *  * Meal *  *, Dinner, Between 50-75% Consumed  Environmental Precautions  Treaded Slipper Socks on Patient  Patient Turns/Positioning  Patient Turns Self from Side to Side  Patient Turns Assistance/Tolerance  Assistance of One  Bed Positions  Bed Controls On  Head of Bed Elevated  Self regulated      Psychosocial/Neurologic Assessment  Psychosocial Assessment  Psychosocial (WDL):  WDL Except  Patient Behaviors: Anxious, Fatigue  Neurologic Assessment  Neuro (WDL): Exceptions to WDL  Level of Consciousness: Alert  Orientation Level: Oriented X4  Cognition: Follows commands, Appropriate attention/concentration  Speech: Clear  Motor Function/Sensation Assessment: Motor strength, Dorsiflexion  R Foot Dorsiflexion: Moderate  L Foot Dorsiflexion: Weak  Muscle Strength Right Arm: Good Strength Against Gravity and Moderate Resistance  Muscle Strength Left Arm: Good Strength Against Gravity and Moderate Resistance  Muscle Strength Right Leg: Fair Strength against Gravity but No Resistance  Muscle Strength Left Leg: Fair Strength against Gravity but No Resistance  Neuro Additional Assessments: David Coma Scale  EENT (WDL):   WDL Except    Cardio/Pulmonary Assessment  Edema      Respiratory Breath Sounds  RUL Breath Sounds: Clear  RML Breath Sounds: Diminished  RLL Breath Sounds: Diminished  AZ Breath Sounds: Clear  LLL Breath Sounds: Diminished  Cardiac Assessment   Cardiac (WDL):  Within Defined Limits

## 2023-09-03 NOTE — CARE PLAN
"The patient is Stable - Low risk of patient condition declining or worsening    Shift Goals  Clinical Goals: safety, manage nausea  Patient Goals: comfort, manage upset stomach    Problem: Skin Integrity  Goal: Patient's skin integrity will be maintained or improve  Outcome: Progressing  Note:   Escobar Score: 16    Patient's skin remains intact and free from new or accidental injury this shift; no s/s of infection. RN wound protocol checked. Patient is on waffle overlay on mattress. Encouraged hydration and educated about the importance of nutrition to keep skin integrity. Will continue to monitor.       Problem: Fall Risk - Rehab  Goal: Patient will remain free from falls  Outcome: Progressing  Note: Amie Wright Fall risk Assessment Score: 16    High fall risk Interventions   - Alarming seatbelt  - Bed and strip alarm   - Yellow sign by the door   - Yellow wrist band \"Fall risk\"  - Room near to the nurse station  - Do not leave patient unattended in the bathroom  - Fall risk education provided     "

## 2023-09-03 NOTE — PROGRESS NOTES
"  Physical Medicine & Rehabilitation Progress Note    Encounter Date: 9/3/2023    Chief Complaint: Doesn't need ritalin    Interval Events (Subjective):  BP better  Requiring catheterization  BM 9/1    Seen and examined in her room. She is feeling better but still feels foggy in her head. She has trouble getting up to the bathroom, had been nauseous. Eating better and nausea better.     ROS: 14 point ROS negative unless otherwise specified in the HPI    Objective:  VITAL SIGNS: /53   Pulse 76   Temp 36.8 °C (98.2 °F) (Temporal)   Resp 17   Ht 1.651 m (5' 5\")   Wt 61.7 kg (136 lb)   SpO2 95%   BMI 22.63 kg/m²     GEN: No apparent distress  HEENT: Head normocephalic, atraumatic.  Sclera nonicteric bilaterally, no ocular discharge appreciated bilaterally.  CV: Extremities warm and well-perfused, no peripheral edema appreciated bilaterally.  PULMONARY: Breathing nonlabored on room air, no respiratory accessory muscle use.  Not requiring supplemental oxygen.  SKIN: No appreciable skin breakdown on exposed areas of skin.  PSYCH: Mood and affect within normal limits.  NEURO: Awake alert.  Conversational.  Logical thought content.      Laboratory Values:  Recent Results (from the past 72 hour(s))   HEMOGLOBIN A1C    Collection Time: 09/01/23  5:30 AM   Result Value Ref Range    Glycohemoglobin 5.8 (H) 4.0 - 5.6 %    Est Avg Glucose 120 mg/dL       Medications:  Scheduled Medications   Medication Dose Frequency    methylphenidate  10 mg QDAY with Breakfast    enoxaparin (LOVENOX) injection  40 mg DAILY AT 1800    ciprofloxacin  500 mg Q12HRS    Pharmacy Consult Request  1 Each PHARMACY TO DOSE    omeprazole  20 mg DAILY    ammonium lactate   BID    senna-docusate  2 Tablet BID    And    polyethylene glycol/lytes  1 Packet DAILY    melatonin  4.5 mg QHS     PRN medications: traMADol, traMADol, Respiratory Therapy Consult, hydrALAZINE, carboxymethylcellulose, benzocaine-menthol, mag hydrox-al hydrox-simeth, " ondansetron **OR** ondansetron, traZODone, sodium chloride, acetaminophen, senna-docusate **AND** polyethylene glycol/lytes **AND** magnesium hydroxide **AND** bisacodyl    Diet:  Current Diet Order   Procedures    Diet Order Diet: Regular       Medical Decision Making and Plan:    Right Hip Per-Prosthetic Fracture s/p ORIF Dr. Galvan 8/29/23  PT and OT for mobility and ADLs. Per guidelines, 15 hours per week between PT, OT and/or SLP.  Follow-up Ortho  WBAT RLE  [place] on until outpatient follow up  Sutures out 10-14 days post-op     Pain  PRN Tylenol + Oxycodone     ABLA  Hgb drop 9.3-->8.8  Monitor     Depression  No anti-depressant at home     Hyperglycemia  Order Hgb A1c - 5.8  Monitor    Hypotension  Abdominal binder      ADHD   Add home Adderall XR 30mg daily when patient dtr brings.   9/1 Schedule Ritalin 10mg daily - qAM  9/3 Stop Ritalin, add patient's home Adderall.      Skin  Patient at risk for skin breakdown due to debility in areas including sacrum, achilles, elbows and head in addition to other sites. Nursing to assess skin daily.      GI Ppx  Patient on Prilosec for GERD prophylaxis.      Sleep  Melatonin     Bowel   Patient on Senna-docusate for constipation prophylaxis.   Last BM: 9/1/23     Bladder  Proteus Mirabilis UTI on admission  IDFC - remove if no contraindication  Start Ciprofloxacin 8/31, continue 9/1 x 5 days, reviewed sensitivities and Cipro ok  9/3 requiring CIC     DVT PROPHYLAXIS: Lovenox 30mg SQ q12hrs on admission --> 40mg SQ daily  Switch to ASA 80mg nusrat on D/c per Ortho     HOSPITALIST FOLLOWING: No     CODE STATUS: FULL CODE     DISPO: Lives alone in 1 story apartment on second floor. Elevator present. Daughter can help intermittently.   ____________________________________    Dr. Fauzia Jain DO, MS  [facility] - Physical Medicine & Rehabilitation   ____________________________________

## 2023-09-04 ENCOUNTER — APPOINTMENT (OUTPATIENT)
Dept: PHYSICAL THERAPY | Facility: REHABILITATION | Age: 78
DRG: 560 | End: 2023-09-04
Attending: PHYSICAL MEDICINE & REHABILITATION
Payer: MEDICARE

## 2023-09-04 ENCOUNTER — APPOINTMENT (OUTPATIENT)
Dept: OCCUPATIONAL THERAPY | Facility: REHABILITATION | Age: 78
DRG: 560 | End: 2023-09-04
Attending: PHYSICAL MEDICINE & REHABILITATION
Payer: MEDICARE

## 2023-09-04 LAB
ANION GAP SERPL CALC-SCNC: 10 MMOL/L (ref 7–16)
BASOPHILS # BLD AUTO: 0.5 % (ref 0–1.8)
BASOPHILS # BLD: 0.04 K/UL (ref 0–0.12)
BUN SERPL-MCNC: 17 MG/DL (ref 8–22)
CALCIUM SERPL-MCNC: 8.6 MG/DL (ref 8.5–10.5)
CHLORIDE SERPL-SCNC: 97 MMOL/L (ref 96–112)
CO2 SERPL-SCNC: 27 MMOL/L (ref 20–33)
CREAT SERPL-MCNC: 0.58 MG/DL (ref 0.5–1.4)
EOSINOPHIL # BLD AUTO: 0.17 K/UL (ref 0–0.51)
EOSINOPHIL NFR BLD: 2.1 % (ref 0–6.9)
ERYTHROCYTE [DISTWIDTH] IN BLOOD BY AUTOMATED COUNT: 43.6 FL (ref 35.9–50)
GFR SERPLBLD CREATININE-BSD FMLA CKD-EPI: 91 ML/MIN/1.73 M 2
GLUCOSE SERPL-MCNC: 114 MG/DL (ref 65–99)
HCT VFR BLD AUTO: 28.9 % (ref 37–47)
HGB BLD-MCNC: 9.4 G/DL (ref 12–16)
IMM GRANULOCYTES # BLD AUTO: 0.31 K/UL (ref 0–0.11)
IMM GRANULOCYTES NFR BLD AUTO: 3.8 % (ref 0–0.9)
LYMPHOCYTES # BLD AUTO: 1.52 K/UL (ref 1–4.8)
LYMPHOCYTES NFR BLD: 18.8 % (ref 22–41)
MCH RBC QN AUTO: 29.5 PG (ref 27–33)
MCHC RBC AUTO-ENTMCNC: 32.5 G/DL (ref 32.2–35.5)
MCV RBC AUTO: 90.6 FL (ref 81.4–97.8)
MONOCYTES # BLD AUTO: 0.71 K/UL (ref 0–0.85)
MONOCYTES NFR BLD AUTO: 8.8 % (ref 0–13.4)
NEUTROPHILS # BLD AUTO: 5.32 K/UL (ref 1.82–7.42)
NEUTROPHILS NFR BLD: 66 % (ref 44–72)
NRBC # BLD AUTO: 0 K/UL
NRBC BLD-RTO: 0 /100 WBC (ref 0–0.2)
PLATELET # BLD AUTO: 301 K/UL (ref 164–446)
PMV BLD AUTO: 9.6 FL (ref 9–12.9)
POTASSIUM SERPL-SCNC: 4.2 MMOL/L (ref 3.6–5.5)
RBC # BLD AUTO: 3.19 M/UL (ref 4.2–5.4)
SODIUM SERPL-SCNC: 134 MMOL/L (ref 135–145)
WBC # BLD AUTO: 8.1 K/UL (ref 4.8–10.8)

## 2023-09-04 PROCEDURE — 700102 HCHG RX REV CODE 250 W/ 637 OVERRIDE(OP): Performed by: PHYSICAL MEDICINE & REHABILITATION

## 2023-09-04 PROCEDURE — 97535 SELF CARE MNGMENT TRAINING: CPT

## 2023-09-04 PROCEDURE — 97530 THERAPEUTIC ACTIVITIES: CPT

## 2023-09-04 PROCEDURE — A9270 NON-COVERED ITEM OR SERVICE: HCPCS | Performed by: PHYSICAL MEDICINE & REHABILITATION

## 2023-09-04 PROCEDURE — 80048 BASIC METABOLIC PNL TOTAL CA: CPT

## 2023-09-04 PROCEDURE — 97110 THERAPEUTIC EXERCISES: CPT | Mod: CQ

## 2023-09-04 PROCEDURE — 97530 THERAPEUTIC ACTIVITIES: CPT | Mod: CQ

## 2023-09-04 PROCEDURE — 700111 HCHG RX REV CODE 636 W/ 250 OVERRIDE (IP): Performed by: PHYSICAL MEDICINE & REHABILITATION

## 2023-09-04 PROCEDURE — 85025 COMPLETE CBC W/AUTO DIFF WBC: CPT

## 2023-09-04 PROCEDURE — 770010 HCHG ROOM/CARE - REHAB SEMI PRIVAT*

## 2023-09-04 PROCEDURE — 36415 COLL VENOUS BLD VENIPUNCTURE: CPT

## 2023-09-04 PROCEDURE — 97112 NEUROMUSCULAR REEDUCATION: CPT

## 2023-09-04 PROCEDURE — 97116 GAIT TRAINING THERAPY: CPT | Mod: CQ

## 2023-09-04 RX ORDER — DEXTROAMPHETAMINE SACCHARATE, AMPHETAMINE ASPARTATE MONOHYDRATE, DEXTROAMPHETAMINE SULFATE AND AMPHETAMINE SULFATE 7.5; 7.5; 7.5; 7.5 MG/1; MG/1; MG/1; MG/1
30 CAPSULE, EXTENDED RELEASE ORAL EVERY MORNING
Status: DISCONTINUED | OUTPATIENT
Start: 2023-09-05 | End: 2023-09-11 | Stop reason: HOSPADM

## 2023-09-04 RX ADMIN — OMEPRAZOLE 20 MG: 20 CAPSULE, DELAYED RELEASE ORAL at 08:17

## 2023-09-04 RX ADMIN — CIPROFLOXACIN 500 MG: 500 TABLET, FILM COATED ORAL at 08:17

## 2023-09-04 RX ADMIN — ONDANSETRON 4 MG: 4 TABLET, ORALLY DISINTEGRATING ORAL at 08:57

## 2023-09-04 RX ADMIN — TRAMADOL HYDROCHLORIDE 50 MG: 50 TABLET ORAL at 12:10

## 2023-09-04 RX ADMIN — ACETAMINOPHEN 500 MG: 500 TABLET ORAL at 08:53

## 2023-09-04 RX ADMIN — SENNOSIDES AND DOCUSATE SODIUM 2 TABLET: 50; 8.6 TABLET ORAL at 08:17

## 2023-09-04 RX ADMIN — TRAMADOL HYDROCHLORIDE 25 MG: 50 TABLET ORAL at 05:59

## 2023-09-04 RX ADMIN — Medication 4.5 MG: at 23:06

## 2023-09-04 RX ADMIN — POLYETHYLENE GLYCOL 3350 1 PACKET: 17 POWDER, FOR SOLUTION ORAL at 08:17

## 2023-09-04 RX ADMIN — ENOXAPARIN SODIUM 40 MG: 100 INJECTION SUBCUTANEOUS at 16:54

## 2023-09-04 RX ADMIN — CIPROFLOXACIN 500 MG: 500 TABLET, FILM COATED ORAL at 23:07

## 2023-09-04 RX ADMIN — TRAZODONE HYDROCHLORIDE 50 MG: 50 TABLET ORAL at 23:10

## 2023-09-04 RX ADMIN — SENNOSIDES AND DOCUSATE SODIUM 2 TABLET: 50; 8.6 TABLET ORAL at 23:06

## 2023-09-04 RX ADMIN — Medication: at 08:17

## 2023-09-04 ASSESSMENT — GAIT ASSESSMENTS
GAIT LEVEL OF ASSIST: CONTACT GUARD ASSIST
DISTANCE (FEET): 50
DEVIATION: ANTALGIC;STEP TO;DECREASED BASE OF SUPPORT;BRADYKINETIC
GAIT LEVEL OF ASSIST: CONTACT GUARD ASSIST
DEVIATION: ATAXIC;STEP TO;DECREASED BASE OF SUPPORT;BRADYKINETIC;DECREASED HEEL STRIKE;DECREASED TOE OFF
DISTANCE (FEET): 125
ASSISTIVE DEVICE: FRONT WHEEL WALKER
ASSISTIVE DEVICE: FRONT WHEEL WALKER

## 2023-09-04 ASSESSMENT — PAIN DESCRIPTION - PAIN TYPE: TYPE: ACUTE PAIN

## 2023-09-04 ASSESSMENT — ACTIVITIES OF DAILY LIVING (ADL)
TOILET_TRANSFER_DESCRIPTION: GRAB BAR;REQUIRES LIFT;SET-UP OF EQUIPMENT;SUPERVISION FOR SAFETY;VERBAL CUEING
BED_CHAIR_WHEELCHAIR_TRANSFER_DESCRIPTION: ADAPTIVE EQUIPMENT;SET-UP OF EQUIPMENT;SUPERVISION FOR SAFETY;VERBAL CUEING
BED_CHAIR_WHEELCHAIR_TRANSFER_DESCRIPTION: ADAPTIVE EQUIPMENT;INCREASED TIME;SUPERVISION FOR SAFETY;VERBAL CUEING;SET-UP OF EQUIPMENT

## 2023-09-04 NOTE — PROGRESS NOTES
NURSING DAILY NOTE    Name: Barbra العراقي   Date of Admission: 8/30/2023   Admitting Diagnosis: Closed right hip fracture, initial encounter ([facility])  Attending Physician: Fauzia Jain D.o.  Allergies: Ampicillin and Keflex [cephalexin]    Safety  Patient Assist  mod assist  Patient Precautions  Fall Risk, Weight Bearing As Tolerated Right Lower Extremity  Precaution Comments  wound vac R hip  Bed Transfer Status  Contact Guard Assist (bed>w/c>toilet>w/c>nustep>w/c>bed. CGA to Min with fatigue, SPT with 2ww)  Toilet Transfer Status   Minimal Assist  Assistive Devices  Gait Belt, Rails, Wheelchair  Oxygen  None - Room Air  Diet/Therapeutic Dining  Current Diet Order   Procedures    Diet Order Diet: Regular     Pill Administration  whole  Agitated Behavioral Scale     ABS Level of Severity       Fall Risk  Has the patient had a fall this admission?   No  Amie Wright Fall Risk Scoring  16, HIGH RISK  Fall Risk Safety Measures  bed alarm and chair alarm    Vitals  Temperature: 36.5 °C (97.7 °F)  Temp src: Oral  Pulse: 77  Respiration: 16  Blood Pressure : 132/59  Blood Pressure MAP (Calculated): 83 MM HG  BP Location: Right, Upper Arm  Patient BP Position: Supine     Oxygen  Pulse Oximetry: 97 %  O2 (LPM): 0  O2 Delivery Device: None - Room Air    Bowel and Bladder  Last Bowel Movement  09/04/23  Stool Type  Type 4: Like a sausage or snake, smooth and soft  Bowel Device  Bathroom  Continent  Bladder: Did not void (torres in place)   Bowel: Continent movement  Bladder Function  Urine Void (mL):  (moderate)  Number of Times Voided: 1  Urine Color: Unable To Evaluate  Urine Clarity: Clear  Number of Times Incontinent of Urine: 1  Straight Catheter: 500 ml  Genitourinary Assessment   Bladder Assessment (WDL):  WDL Except  Torres Catheter: Present with Active Order  Torres Reasons per MD Order: Acute urinary retention or bladder outlet obstruction  Torres Care: Given  with Soap and Water  Urinary Elimination: Incontinence  Urine Color: Unable To Evaluate  Urine Clarity: Clear  Number of Times Incontinent of Urine: 1  Bladder Device: Bathroom  Bladder Scan: Unable To Void  $ Bladder Scan Results (mL): 396  Bladder Medications: No    Skin  Escobar Score   16  Sensory Interventions   Bed Types: Standard/Trauma Mattress  Skin Preventative Measures: Pillows in Use for Support / Positioning  Moisture Interventions  Moisturizers/Barriers: Barrier Paste      Pain  Pain Rating Scale  3 - Sometimes distracts me  Pain Location  Hip  Pain Location Orientation  Right  Pain Interventions   Declines    ADLs    Bathing   Patient Refused Bathing (pt refused shower)  Linen Change   Partial  Personal Hygiene  Change Yaritza Pads, Moist Yaritza Wipes, Perineal Care  Chlorhexidine Bath      Oral Care  Brushed Teeth  Teeth/Dentures  Missing Teeth (Comments)  Shave     Nutrition Percentage Eaten  Lunch, Between % Consumed  Environmental Precautions  Treaded Slipper Socks on Patient  Patient Turns/Positioning  Patient Turns Self from Side to Side  Patient Turns Assistance/Tolerance  Assistance of One  Bed Positions  Bed Controls On  Head of Bed Elevated  Self regulated      Psychosocial/Neurologic Assessment  Psychosocial Assessment  Psychosocial (WDL):  WDL Except  Patient Behaviors: Anxious, Fatigue  Neurologic Assessment  Neuro (WDL): Exceptions to WDL  Level of Consciousness: Alert  Orientation Level: Oriented X4  Cognition: Follows commands, Appropriate attention/concentration  Speech: Clear  Motor Function/Sensation Assessment: Motor strength, Dorsiflexion  R Foot Dorsiflexion: Moderate  L Foot Dorsiflexion: Weak  Muscle Strength Right Arm: Good Strength Against Gravity and Moderate Resistance  Muscle Strength Left Arm: Good Strength Against Gravity and Moderate Resistance  Muscle Strength Right Leg: Fair Strength against Gravity but No Resistance  Muscle Strength Left Leg: Fair Strength against  Gravity but No Resistance  Neuro Additional Assessments: [place] Coma Scale  EENT (WDL):  WDL Except    Cardio/Pulmonary Assessment  Edema      Respiratory Breath Sounds  RUL Breath Sounds: Clear  RML Breath Sounds: Diminished  RLL Breath Sounds: Diminished  AZ Breath Sounds: Clear  LLL Breath Sounds: Diminished  Cardiac Assessment   Cardiac (WDL):  Within Defined Limits

## 2023-09-04 NOTE — THERAPY
Physical Therapy   Daily Treatment     Patient Name: Barbra العراقي  Age:  78 y.o., Sex:  female  Medical Record #: [id number]  Today's Date: 9/4/2023     Precautions  Precautions: Fall Risk, Weight Bearing As Tolerated Right Lower Extremity  Comments: wound vac R hip    Subjective    Patient resting in bed, agreeable to session. Has reports of nausea w/o emesis later in session.      Objective       09/04/23 0701   PT Charge Group   PT Gait Training (Units) 1   PT Therapeutic Exercise (Units) 1   PT Therapeutic Activities (Units) 2   PT Total Time Spent   PT Individual Total Time Spent (Mins) 60   Vitals   Pulse (!) 103   Patient BP Position Sitting   Blood Pressure  (!) 143/58   O2 (LPM) 0   Vitals Comments post ambulation   Gait Functional Level of Assist    Gait Level Of Assist Contact Guard Assist   Assistive Device Front Wheel Walker   Distance (Feet) 125   # of Times Distance was Traveled 1  (and 60 x 2)   Deviation Antalgic;Step To;Decreased Base Of Support;Bradykinetic  (tactile cues for pacing)   Sitting Lower Body Exercises   Hip Abduction 2 sets of 10;Bilateral  (w/ AAROM as needed to improve ROM. self limiting distance)   Long Arc Quad 2 sets of 10;Right  (towel slides on floor)   Hamstring Curl 2 sets of 10;Right  (towel slides on floor)   Bed Mobility    Supine to Sit Contact Guard Assist  (w/ leg  and increased time)   Sit to Stand Minimal Assist   Interdisciplinary Plan of Care Collaboration   Patient Position at End of Therapy Seated;Chair Alarm On;Tray Table within Reach;Call Light within Reach     Worked on STS mechanics from w/c and arm chair height: cues for UE placements, and LE placements (pt self selects RLE extension during STS w/o weight bearing) importance of ROM and using RLE as tolerated.     Encouraged OOB tolerance and eating meals upright if possible moving forward.     Assessment    Patient limited by increasing pain with mobility, which results in nausea. Her nausea seems to  correlate with activity or ROM of the RLE, as it increased during gait or exercises. Provided inc time for rests and education throughout session.     Strengths: Able to follow instructions, Effective communication skills, Good insight into deficits/needs, Independent prior level of function, Motivated for self care and independence, Pleasant and cooperative, Supportive family, Willingly participates in therapeutic activities  Barriers: Decreased endurance, Generalized weakness, Impaired activity tolerance, Impaired balance, Limited mobility, Pain, Pain poorly managed    Plan       Continue to progress bed mobility with use of leg , STS/transfer training, ambulation, LE strengthening        Passport items to be completed:  Get in/out of bed safely, in/out of a vehicle, safely use mobility device, walk or wheel around home/community, navigate up and down stairs, show how to get up/down from the ground, ensure home is accessible, demonstrate HEP, complete caregiver training    Physical Therapy Problems (Active)       Problem: Balance       Dates: Start:  08/31/23         Goal: STG-Within one week, patient will maintain static standing 3min with unilateral UE support SPV       Dates: Start:  08/31/23    Expected End:  09/22/23               Problem: Mobility       Dates: Start:  08/31/23         Goal: STG-Within one week, patient will propel wheelchair community 400ft mod I indoors       Dates: Start:  08/31/23    Expected End:  09/22/23            Goal: STG-Within one week, patient will ambulate household distance 25ft x 4 with FWW SBA       Dates: Start:  08/31/23    Expected End:  09/22/23            Goal: STG-Within one week, patient will ambulate up/down a curb with FWW mod assist       Dates: Start:  08/31/23    Expected End:  09/22/23               Problem: Mobility Transfers       Dates: Start:  08/31/23         Goal: STG-Within one week, patient will transfer bed to chair SBA SPT (5 out of 5 trials)        Dates: Start:  08/31/23    Expected End:  09/22/23               Problem: PT-Long Term Goals       Dates: Start:  08/31/23         Goal: LTG-By discharge, patient will tolerate standing 5min at sink to complete ADLs mod I       Dates: Start:  08/31/23    Expected End:  09/22/23            Goal: LTG-By discharge, patient will ambulate 150ft x 4 with FWW mod I        Dates: Start:  08/31/23    Expected End:  09/22/23            Goal: LTG-By discharge, patient will transfer one surface to another mod I SPT safely and consistently       Dates: Start:  08/31/23    Expected End:  09/22/23            Goal: LTG-By discharge, patient will perform home exercise program seated/standing for LE strengthening to be done 3x/day in safe, independent home environment       Dates: Start:  08/31/23    Expected End:  09/22/23            Goal: LTG-By discharge, patient will up/down curb with FWW CGA       Dates: Start:  08/31/23    Expected End:  09/22/23

## 2023-09-04 NOTE — THERAPY
"Physical Therapy   Daily Treatment     Patient Name: Barbra العراقي  Age:  78 y.o., Sex:  female  Medical Record #: [id number]  Today's Date: 9/4/2023     Precautions  Precautions: Fall Risk, Weight Bearing As Tolerated Right Lower Extremity  Comments: wound vac, R hip    Subjective    \"There is an elevator where I live\"     Objective       09/04/23 0931 09/04/23 1431   Therapy Missed   Missed Therapy (Minutes) 45  (moved pt to PM session)  --    Reason For Missed Therapy Medical - Patient has Nausea / Vomiting  --    PT Charge Group   PT Gait Training (Units)  --  2   PT Therapeutic Exercise (Units)  --  1   PT Therapeutic Activities (Units) 1  --    Supervising Physical Therapist joaquina Dickey   PT Total Time Spent   PT Individual Total Time Spent (Mins) 15 45   Gait Functional Level of Assist    Gait Level Of Assist  --  Contact Guard Assist   Assistive Device  --  Front Wheel Walker   Distance (Feet)  --  50   # of Times Distance was Traveled  --  2   Deviation  --  Ataxic;Step To;Decreased Base Of Support;Bradykinetic;Decreased Heel Strike;Decreased Toe Off  (cues for continuious pattern)   Wheelchair Functional Level of Assist   Wheelchair Assist  --  Stand by Assist   Distance Wheelchair (Feet or Distance)  --  100   Wheelchair Description  --  Extra time;Limited by fatigue;Impaired coordination;Supervision for safety;Verbal cueing   Stairs Functional Level of Assist   Level of Assist with Stairs  --  Unable to Participate   # of Stairs Climbed  --  0   Stairs Description  --  Safety concerns   Transfer Functional Level of Assist   Bed, Chair, Wheelchair Transfer  --  Contact Guard Assist   Bed Chair Wheelchair Transfer Description  --  Adaptive equipment;Increased time;Supervision for safety;Verbal cueing;Set-up of equipment   Sitting Lower Body Exercises   Nustep  --  Resistance Level 1;Time (See Comments)  (very small ROM, ultimately had pt use LE's only to promote inc use of her R LE)   Bed Mobility "    Supine to Sit Standby Assist  (increased time, ESTRELLA and vc for initiation)  --    Sit to Supine Standby Assist  --    Sit to Stand  --  Contact Guard Assist   Interdisciplinary Plan of Care Collaboration   IDT Collaboration with  Nursing Certified Nursing Assistant   Patient Position at End of Therapy In Bed;Call Light within Reach;Tray Table within Reach Seated;Self Releasing Lap Belt Applied;Call Light within Reach   Collaboration Comments pt with nausea, recently had zofram however cont to c/o nausea toileting         Assessment    Pt limited by B wrist pain during amb with FWW. She required encouragement to participate, can be self limiting at times. Her nausea has been a barrier today  Strengths: Able to follow instructions, Effective communication skills, Good insight into deficits/needs, Independent prior level of function, Motivated for self care and independence, Pleasant and cooperative, Supportive family, Willingly participates in therapeutic activities  Barriers: Decreased endurance, Generalized weakness, Impaired activity tolerance, Impaired balance, Limited mobility, Pain, Pain poorly managed    Plan    Continue to progress bed mobility with use of leg , STS/transfer training, ambulation, LE strengthening        Passport items to be completed:  Get in/out of bed safely, in/out of a vehicle, safely use mobility device, walk or wheel around home/community, navigate up and down stairs, show how to get up/down from the ground, ensure home is accessible, demonstrate HEP, complete caregiver training    Physical Therapy Problems (Active)       Problem: Balance       Dates: Start:  08/31/23         Goal: STG-Within one week, patient will maintain static standing 3min with unilateral UE support SPV       Dates: Start:  08/31/23    Expected End:  09/22/23               Problem: Mobility       Dates: Start:  08/31/23         Goal: STG-Within one week, patient will propel wheelchair community 400ft mod I  indoors       Dates: Start:  08/31/23    Expected End:  09/22/23            Goal: STG-Within one week, patient will ambulate household distance 25ft x 4 with FWW SBA       Dates: Start:  08/31/23    Expected End:  09/22/23            Goal: STG-Within one week, patient will ambulate up/down a curb with FWW mod assist       Dates: Start:  08/31/23    Expected End:  09/22/23               Problem: Mobility Transfers       Dates: Start:  08/31/23         Goal: STG-Within one week, patient will transfer bed to chair SBA SPT (5 out of 5 trials)       Dates: Start:  08/31/23    Expected End:  09/22/23               Problem: PT-Long Term Goals       Dates: Start:  08/31/23         Goal: LTG-By discharge, patient will tolerate standing 5min at sink to complete ADLs mod I       Dates: Start:  08/31/23    Expected End:  09/22/23            Goal: LTG-By discharge, patient will ambulate 150ft x 4 with FWW mod I        Dates: Start:  08/31/23    Expected End:  09/22/23            Goal: LTG-By discharge, patient will transfer one surface to another mod I SPT safely and consistently       Dates: Start:  08/31/23    Expected End:  09/22/23            Goal: LTG-By discharge, patient will perform home exercise program seated/standing for LE strengthening to be done 3x/day in safe, independent home environment       Dates: Start:  08/31/23    Expected End:  09/22/23            Goal: LTG-By discharge, patient will up/down curb with FWW CGA       Dates: Start:  08/31/23    Expected End:  09/22/23

## 2023-09-04 NOTE — THERAPY
Occupational Therapy  Daily Treatment     Patient Name: Barbra العراقي  Age:  78 y.o., Sex:  female  Medical Record #: [id number]  Today's Date: 9/4/2023     Precautions  Precautions: (P) Fall Risk, Weight Bearing As Tolerated Right Lower Extremity  Comments: (P) wound vac, R hip         Subjective    Patient was in bed and requested to try to void.  She was unable, but did have a BM.     Objective       09/04/23 1231   OT Charge Group   OT Self Care / ADL (Units) 2   OT Neuromuscular Re-education / Balance (Units) 1   OT Therapy Activity (Units) 1   OT Total Time Spent   OT Individual Total Time Spent (Mins) 60   Precautions   Precautions Fall Risk;Weight Bearing As Tolerated Right Lower Extremity   Comments wound vac, R hip   Pain 0 - 10 Group   Location Hip   Location Orientation Right   Therapist Pain Assessment During Activity   Functional Level of Assist   Grooming Standby Assist;Standing   Grooming Description Standing at sink;Supervision for safety  (to wash hands)   Toileting Contact Guard Assist   Toileting Description Grab bar;Increased time;Set-up of equipment;Supervision for safety;Verbal cueing;Adaptive equipment  (fww)   Bed, Chair, Wheelchair Transfer Contact Guard Assist   Bed Chair Wheelchair Transfer Description Adaptive equipment;Set-up of equipment;Supervision for safety;Verbal cueing  (CGA bed <> w/c with FWW)   Toilet Transfers Minimal Assist   Toilet Transfer Description Grab bar;Requires lift;Set-up of equipment;Supervision for safety;Verbal cueing   Bed Mobility    Supine to Sit Standby Assist  (lef )   Sit to Supine Standby Assist  (leg )   Scooting Standby Assist   Skilled Intervention Verbal Cuing   Interdisciplinary Plan of Care Collaboration   Patient Position at End of Therapy In Bed;Call Light within Reach;Tray Table within Reach;Phone within Reach;Bed Alarm On     Dynamic standing balance in parallel bars with single to no UE support on and off airex foam pad while reaching  with R UE to the right for objects, then tossing at a target.  She required CGA.      Assessment    Patient required encouragement to perform her own hygiene after bowel movement as she was waiting for OT to do it.  She had no losses of balance when not on airex pad, but multiple losses of balance when on airex.  Strengths: Able to follow instructions, Alert and oriented, Effective communication skills, Good insight into deficits/needs, Independent prior level of function, Motivated for self care and independence, Pleasant and cooperative, Supportive family, Willingly participates in therapeutic activities  Barriers: Decreased endurance, Constipation, Generalized weakness, Impaired balance, Limited mobility, Pain    Plan    ADLs w/ AE prn, IADLs when able, functional mobility with FWW, strength/endurance, standing tolerance/balance    Occupational Therapy Goals (Active)       Problem: Dressing       Dates: Start:  08/31/23         Goal: STG-Within one week, patient will dress LB with max A using AE       Dates: Start:  08/31/23    Expected End:  09/22/23               Problem: Functional Transfers       Dates: Start:  08/31/23         Goal: STG-Within one week, patient will transfer to toilet with min A using grab bar and commode over toilet       Dates: Start:  08/31/23    Expected End:  09/22/23               Problem: OT Long Term Goals       Dates: Start:  08/31/23         Goal: LTG-By discharge, patient will complete basic self care tasks with mod I       Dates: Start:  08/31/23    Expected End:  09/22/23            Goal: LTG-By discharge, patient will perform bathroom transfers with mod I       Dates: Start:  08/31/23    Expected End:  09/22/23            Goal: LTG-By discharge, patient will complete basic home management with supervision/mod I       Dates: Start:  08/31/23    Expected End:  09/22/23               Problem: Toileting       Dates: Start:  08/31/23         Goal: STG-Within one week, patient will  complete toileting tasks with max A        Dates: Start:  08/31/23    Expected End:  09/22/23

## 2023-09-04 NOTE — PROGRESS NOTES
Patient care assumed. Report received from [facility] AARON Mulligan. Patient is alert and calm, resting in bed. Call light and bedside table within reach. Will continue to monitor.

## 2023-09-05 ENCOUNTER — APPOINTMENT (OUTPATIENT)
Dept: PHYSICAL THERAPY | Facility: REHABILITATION | Age: 78
DRG: 560 | End: 2023-09-05
Attending: PHYSICAL MEDICINE & REHABILITATION
Payer: MEDICARE

## 2023-09-05 ENCOUNTER — APPOINTMENT (OUTPATIENT)
Dept: OCCUPATIONAL THERAPY | Facility: REHABILITATION | Age: 78
DRG: 560 | End: 2023-09-05
Attending: PHYSICAL MEDICINE & REHABILITATION
Payer: MEDICARE

## 2023-09-05 PROCEDURE — 97110 THERAPEUTIC EXERCISES: CPT

## 2023-09-05 PROCEDURE — A9270 NON-COVERED ITEM OR SERVICE: HCPCS | Performed by: PHYSICAL MEDICINE & REHABILITATION

## 2023-09-05 PROCEDURE — 97530 THERAPEUTIC ACTIVITIES: CPT

## 2023-09-05 PROCEDURE — 770010 HCHG ROOM/CARE - REHAB SEMI PRIVAT*

## 2023-09-05 PROCEDURE — 700102 HCHG RX REV CODE 250 W/ 637 OVERRIDE(OP): Performed by: PHYSICAL MEDICINE & REHABILITATION

## 2023-09-05 PROCEDURE — 97116 GAIT TRAINING THERAPY: CPT

## 2023-09-05 PROCEDURE — 99233 SBSQ HOSP IP/OBS HIGH 50: CPT | Performed by: PHYSICAL MEDICINE & REHABILITATION

## 2023-09-05 PROCEDURE — 97535 SELF CARE MNGMENT TRAINING: CPT

## 2023-09-05 PROCEDURE — 700111 HCHG RX REV CODE 636 W/ 250 OVERRIDE (IP): Mod: JZ | Performed by: PHYSICAL MEDICINE & REHABILITATION

## 2023-09-05 RX ADMIN — CIPROFLOXACIN 500 MG: 500 TABLET, FILM COATED ORAL at 08:16

## 2023-09-05 RX ADMIN — POLYETHYLENE GLYCOL 3350 1 PACKET: 17 POWDER, FOR SOLUTION ORAL at 08:08

## 2023-09-05 RX ADMIN — Medication: at 21:22

## 2023-09-05 RX ADMIN — TRAMADOL HYDROCHLORIDE 50 MG: 50 TABLET ORAL at 20:31

## 2023-09-05 RX ADMIN — CIPROFLOXACIN 500 MG: 500 TABLET, FILM COATED ORAL at 20:29

## 2023-09-05 RX ADMIN — SENNOSIDES AND DOCUSATE SODIUM 2 TABLET: 50; 8.6 TABLET ORAL at 20:28

## 2023-09-05 RX ADMIN — ENOXAPARIN SODIUM 40 MG: 100 INJECTION SUBCUTANEOUS at 17:35

## 2023-09-05 RX ADMIN — OMEPRAZOLE 20 MG: 20 CAPSULE, DELAYED RELEASE ORAL at 08:16

## 2023-09-05 RX ADMIN — DEXTROAMPHETAMINE SACCHARATE, AMPHETAMINE ASPARTATE MONOHYDRATE, DEXTROAMPHETAMINE SULFATE AND AMPHETAMINE SULFATE 30 MG: 7.5; 7.5; 7.5; 7.5 CAPSULE, EXTENDED RELEASE ORAL at 09:00

## 2023-09-05 RX ADMIN — SENNOSIDES AND DOCUSATE SODIUM 2 TABLET: 50; 8.6 TABLET ORAL at 08:16

## 2023-09-05 RX ADMIN — TRAMADOL HYDROCHLORIDE 50 MG: 50 TABLET ORAL at 08:21

## 2023-09-05 RX ADMIN — Medication 4.5 MG: at 20:28

## 2023-09-05 ASSESSMENT — GAIT ASSESSMENTS
ASSISTIVE DEVICE: FRONT WHEEL WALKER
DEVIATION: ANTALGIC;BRADYKINETIC
ASSISTIVE DEVICE: FRONT WHEEL WALKER
DISTANCE (FEET): 115
GAIT LEVEL OF ASSIST: CONTACT GUARD ASSIST
GAIT LEVEL OF ASSIST: CONTACT GUARD ASSIST
DEVIATION: ANTALGIC;BRADYKINETIC
DISTANCE (FEET): 60

## 2023-09-05 ASSESSMENT — ACTIVITIES OF DAILY LIVING (ADL)
BED_CHAIR_WHEELCHAIR_TRANSFER_DESCRIPTION: ADAPTIVE EQUIPMENT;SET-UP OF EQUIPMENT;SUPERVISION FOR SAFETY;VERBAL CUEING
TUB_SHOWER_TRANSFER_DESCRIPTION: GRAB BAR;SHOWER BENCH;REQUIRES LIFT;SET-UP OF EQUIPMENT;SUPERVISION FOR SAFETY;VERBAL CUEING
BED_CHAIR_WHEELCHAIR_TRANSFER_DESCRIPTION: ADAPTIVE EQUIPMENT;INCREASED TIME;SET-UP OF EQUIPMENT;SUPERVISION FOR SAFETY;VERBAL CUEING
TOILET_TRANSFER_DESCRIPTION: GRAB BAR;INCREASED TIME;SUPERVISION FOR SAFETY;VERBAL CUEING

## 2023-09-05 NOTE — THERAPY
"Physical Therapy   Daily Treatment     Patient Name: Barbra العراقي  Age:  78 y.o., Sex:  female  Medical Record #: [id number]  Today's Date: 9/5/2023     Precautions  Precautions: Fall Risk, Weight Bearing As Tolerated Right Lower Extremity  Comments: wound vac R hip    Subjective    Patient pleasant and agreeable to participate. Reports she is feeling \"really stiff this morning.\"     Objective       09/05/23 0931   PT Charge Group   PT Gait Training (Units) 1   PT Therapeutic Exercise (Units) 1   PT Therapeutic Activities (Units) 2   PT Total Time Spent   PT Individual Total Time Spent (Mins) 60   Gait Functional Level of Assist    Gait Level Of Assist Contact Guard Assist   Assistive Device Front Wheel Walker   Distance (Feet) 60   # of Times Distance was Traveled 3   Deviation Antalgic;Bradykinetic  (step-through pattern but with increased time in double stance)   Transfer Functional Level of Assist   Bed, Chair, Wheelchair Transfer Contact Guard Assist   Bed Chair Wheelchair Transfer Description Adaptive equipment;Set-up of equipment;Supervision for safety;Verbal cueing  (Stand step bed to WC with FWW)   Toilet Transfers Minimal Assist  (CGA-min A)  -max A clothing management and perihygiene   Toilet Transfer Description Grab bar;Increased time;Supervision for safety;Verbal cueing   Sitting Lower Body Exercises   Ankle Pumps 2 sets of 10   Hip Abduction 2 sets of 10;Bilateral  (isometric against therapist resistance)   Long Arc Quad 2 sets of 10   Sit to Stand 1 set of 10  (from WC to FWW)   Bed Mobility    Supine to Sit Standby Assist  (leg )   Sit to Stand Contact Guard Assist   Interdisciplinary Plan of Care Collaboration   Patient Position at End of Therapy Seated;Self Releasing Lap Belt Applied;Call Light within Reach;Tray Table within Reach;Phone within Reach        09/05/23 1301   PT Charge Group   PT Gait Training (Units) 1   PT Therapeutic Exercise (Units) 2   PT Therapeutic Activities (Units) 1   PT " Total Time Spent   PT Individual Total Time Spent (Mins) 60   Gait Functional Level of Assist    Gait Level Of Assist Contact Guard Assist  (WC in tow by this therapist)   Assistive Device Front Wheel Walker   Distance (Feet) 115  (+75' (seated rest break between bouts))   Deviation Antalgic;Bradykinetic   Transfer Functional Level of Assist   Bed, Chair, Wheelchair Transfer Contact Guard Assist   Bed Chair Wheelchair Transfer Description Adaptive equipment;Increased time;Set-up of equipment;Supervision for safety;Verbal cueing  (Stand step with FWW)   Sitting Lower Body Exercises   Long Arc Quad 2 sets of 10   Marching 2 sets of 10   Comments Heel slide (R LE only) using small ball (heel on ball) 2 x 15 (min A for foot placement on ball)   Standing Lower Body Exercises   Comments Step-over (and back) small box to encourage simultaneous hip and knee flexion (B UE support on FWW) 2 x 8   Bed Mobility    Supine to Sit Standby Assist  (leg )   Sit to Supine Standby Assist  (leg ; extra time and cuing)   Sit to Stand Standby Assist   Scooting Standby Assist   Interdisciplinary Plan of Care Collaboration   IDT Collaboration with  Certified Nursing Assistant   Patient Position at End of Therapy In Bed;Bed Alarm On;Call Light within Reach;Tray Table within Reach;Phone within Reach   Collaboration Comments handoff to CNA for vitals check         Assessment    Patient with improved gait distance and tolerance today, but continues to be limited by pain and decreased activity tolerance. She was able to ambulate with step-through pattern but with decreased hip and knee flexion at the R LE and increased time in double stance. Patient would benefit from continued inpatient physical therapy to address functional mobility deficits.     Strengths: Able to follow instructions, Effective communication skills, Good insight into deficits/needs, Independent prior level of function, Motivated for self care and independence,  Pleasant and cooperative, Supportive family, Willingly participates in therapeutic activities  Barriers: Decreased endurance, Generalized weakness, Impaired activity tolerance, Impaired balance, Limited mobility, Pain, Pain poorly managed    Plan    Continue to progress bed mobility with use of leg , STS/transfer training, ambulation, LE strengthening        Passport items to be completed:  Get in/out of bed safely, in/out of a vehicle, safely use mobility device, walk or wheel around home/community, navigate up and down stairs, show how to get up/down from the ground, ensure home is accessible, demonstrate HEP, complete caregiver training    Physical Therapy Problems (Active)       Problem: Balance       Dates: Start:  08/31/23         Goal: STG-Within one week, patient will maintain static standing 3min with unilateral UE support SPV       Dates: Start:  08/31/23    Expected End:  09/22/23               Problem: Mobility       Dates: Start:  08/31/23         Goal: STG-Within one week, patient will propel wheelchair community 400ft mod I indoors       Dates: Start:  08/31/23    Expected End:  09/22/23            Goal: STG-Within one week, patient will ambulate household distance 25ft x 4 with FWW SBA       Dates: Start:  08/31/23    Expected End:  09/22/23            Goal: STG-Within one week, patient will ambulate up/down a curb with FWW mod assist       Dates: Start:  08/31/23    Expected End:  09/22/23               Problem: Mobility Transfers       Dates: Start:  08/31/23         Goal: STG-Within one week, patient will transfer bed to chair SBA SPT (5 out of 5 trials)       Dates: Start:  08/31/23    Expected End:  09/22/23               Problem: PT-Long Term Goals       Dates: Start:  08/31/23         Goal: LTG-By discharge, patient will tolerate standing 5min at sink to complete ADLs mod I       Dates: Start:  08/31/23    Expected End:  09/22/23            Goal: LTG-By discharge, patient will ambulate  150ft x 4 with FWW mod I        Dates: Start:  08/31/23    Expected End:  09/22/23            Goal: LTG-By discharge, patient will transfer one surface to another mod I SPT safely and consistently       Dates: Start:  08/31/23    Expected End:  09/22/23            Goal: LTG-By discharge, patient will perform home exercise program seated/standing for LE strengthening to be done 3x/day in safe, independent home environment       Dates: Start:  08/31/23    Expected End:  09/22/23            Goal: LTG-By discharge, patient will up/down curb with FWW CGA       Dates: Start:  08/31/23    Expected End:  09/22/23

## 2023-09-05 NOTE — PROGRESS NOTES
"  Physical Medicine & Rehabilitation Progress Note    Encounter Date: 9/5/2023    Chief Complaint: Decreased mobility, weakness    Interval Events (Subjective):  Patient sitting up in therapy gym. She reports therapy is going well except for tightness to right hip. Denies weakness besides pain limited. Discussed would have IDT later today.    _____________________________________  Interdisciplinary Team Conference   Most recent IDT on 9/5/2023    ISara M.D./Ph.D., was present and led the interdisciplinary team conference on 9/5/2023.  I led the IDT conference and agree with the IDT conference documentation and plan of care as noted below.     Nursing:  Diet Current Diet Order   Procedures    Diet Order Diet: Regular       Eating ADL Independent      % of Last Meal  Oral Nutrition: Lunch, Between % Consumed   Sleep    Bowel Last BM: 09/04/23   Bladder CIC   Barriers to Discharge Home:  Wound vac removed  UTI with retention    Physical Therapy:  Bed Mobility    Transfers Contact Guard Assist  Adaptive equipment, Set-up of equipment, Supervision for safety, Verbal cueing (Stand step bed to  with FWW)   Mobility Contact Guard Assist   Stairs    Barriers to Discharge Home:  Self limiting  Wrist pain    Occupational Therapy:  Grooming Independent, Seated   Bathing Minimal Assist   UB Dressing Supervision   LB Dressing Standby Assist   Toileting Contact Guard Assist   Shower & Transfer CGA   Barriers to Discharge Home:  Right hip and thigh pain  Generalized weakness    Tub bench    Case Management:  Continues to work on disposition and DME needs.      Discharge Date/Disposition:  8/11/23  _____________________________________    Objective:  VITAL SIGNS: BP (!) 144/64   Pulse 80   Temp 36.7 °C (98 °F) (Oral)   Resp 16   Ht 1.651 m (5' 5\")   Wt 61.7 kg (136 lb)   SpO2 97%   BMI 22.63 kg/m²   Gen: NAD  Psych: Mood and affect appropriate  CV: RRR, 1+ RLE edema  Resp: CTAB, no upper airway " sounds  Abd: NTND  Neuro: AOx4, following commands    Laboratory Values:  Recent Results (from the past 72 hour(s))   CBC WITH DIFFERENTIAL    Collection Time: 09/04/23  5:53 AM   Result Value Ref Range    WBC 8.1 4.8 - 10.8 K/uL    RBC 3.19 (L) 4.20 - 5.40 M/uL    Hemoglobin 9.4 (L) 12.0 - 16.0 g/dL    Hematocrit 28.9 (L) 37.0 - 47.0 %    MCV 90.6 81.4 - 97.8 fL    MCH 29.5 27.0 - 33.0 pg    MCHC 32.5 32.2 - 35.5 g/dL    RDW 43.6 35.9 - 50.0 fL    Platelet Count 301 164 - 446 K/uL    MPV 9.6 9.0 - 12.9 fL    Neutrophils-Polys 66.00 44.00 - 72.00 %    Lymphocytes 18.80 (L) 22.00 - 41.00 %    Monocytes 8.80 0.00 - 13.40 %    Eosinophils 2.10 0.00 - 6.90 %    Basophils 0.50 0.00 - 1.80 %    Immature Granulocytes 3.80 (H) 0.00 - 0.90 %    Nucleated RBC 0.00 0.00 - 0.20 /100 WBC    Neutrophils (Absolute) 5.32 1.82 - 7.42 K/uL    Lymphs (Absolute) 1.52 1.00 - 4.80 K/uL    Monos (Absolute) 0.71 0.00 - 0.85 K/uL    Eos (Absolute) 0.17 0.00 - 0.51 K/uL    Baso (Absolute) 0.04 0.00 - 0.12 K/uL    Immature Granulocytes (abs) 0.31 (H) 0.00 - 0.11 K/uL    NRBC (Absolute) 0.00 K/uL   Basic Metabolic Panel    Collection Time: 09/04/23  5:53 AM   Result Value Ref Range    Sodium 134 (L) 135 - 145 mmol/L    Potassium 4.2 3.6 - 5.5 mmol/L    Chloride 97 96 - 112 mmol/L    Co2 27 20 - 33 mmol/L    Glucose 114 (H) 65 - 99 mg/dL    Bun 17 8 - 22 mg/dL    Creatinine 0.58 0.50 - 1.40 mg/dL    Calcium 8.6 8.5 - 10.5 mg/dL    Anion Gap 10.0 7.0 - 16.0   ESTIMATED GFR    Collection Time: 09/04/23  5:53 AM   Result Value Ref Range    GFR (CKD-EPI) 91 >60 mL/min/1.73 m 2       Medications:  Scheduled Medications   Medication Dose Frequency    amphetamine-dextroamphetamine ER  30 mg QAM    ciprofloxacin  500 mg Q12HRS    enoxaparin (LOVENOX) injection  40 mg DAILY AT 1800    Pharmacy Consult Request  1 Each PHARMACY TO DOSE    omeprazole  20 mg DAILY    ammonium lactate   BID    senna-docusate  2 Tablet BID    And    polyethylene glycol/lytes   1 Packet DAILY    melatonin  4.5 mg QHS     PRN medications: traMADol, traMADol, Respiratory Therapy Consult, hydrALAZINE, carboxymethylcellulose, benzocaine-menthol, mag hydrox-al hydrox-simeth, ondansetron **OR** ondansetron, traZODone, sodium chloride, acetaminophen, senna-docusate **AND** polyethylene glycol/lytes **AND** magnesium hydroxide **AND** bisacodyl    Diet:  Current Diet Order   Procedures    Diet Order Diet: Regular       Medical Decision Making and Plan:  Right Hip Per-Prosthetic Fracture s/p ORIF Dr. Galvan 8/29/23  PT and OT for mobility and ADLs. Per guidelines, 15 hours per week between PT, OT and/or SLP.  Follow-up Ortho  WBAT RLE  [place] on until outpatient follow up  Sutures out 10-14 days post-op     Pain  PRN Tylenol + Oxycodone. Continue Oxycodone     ABLA  Hgb drop 9.3-->8.8  Monitor     Depression  No anti-depressant at home     Hyperglycemia  Order Hgb A1c - 5.8  Monitor     Hypotension  Abdominal binder      ADHD   Add home Adderall XR 30mg daily when patient dtr brings.   9/1 Schedule Ritalin 10mg daily - qAM  9/3 Stop Ritalin, add patient's home Adderall. Continue Adderall      Skin  Patient at risk for skin breakdown due to debility in areas including sacrum, achilles, elbows and head in addition to other sites. Nursing to assess skin daily.      GI Ppx  Patient on Prilosec for GERD prophylaxis.      Sleep  Melatonin     Bowel   Patient on Senna-docusate for constipation prophylaxis.   Last BM: 9/1/23     Bladder  Proteus Mirabilis UTI on admission  IDFC - remove if no contraindication  Start Ciprofloxacin 8/31, continue 9/1 x 5 days, reviewed sensitivities and Cipro ok  9/3 requiring CIC     DVT PROPHYLAXIS: Lovenox 30mg SQ q12hrs on admission --> 40mg SQ daily. Continue Lovenox  Switch to ASA 80mg nusrat on D/c per Ortho     HOSPITALIST FOLLOWING: No     CODE STATUS: FULL CODE     DISPO: Lives alone in 1 story apartment on second floor. Elevator present. Daughter can help  intermittently.   ____________________________________    T. Don Diaz MD/PhD  [facility] - Physical Medicine & Rehabilitation   [facility] - Brain Injury Medicine   ____________________________________    Total time:  50 minutes. Time spent included pre-rounding review of vitals and tests, unit/floor time, face-to-face time with the patient including physical examination, care coordination, counseling of patient and/or family, ordering medications/procedures/tests, discussion with CM, PT, OT, SLP and/or other healthcare providers, and documentation in the electronic medical record. Topics discussed included discharge planning, pain control, continue adderall, and continue Lovenox. Patient was discussed separately in IDT today; please see details above.

## 2023-09-05 NOTE — CARE PLAN
Problem: Balance  Goal: STG-Within one week, patient will maintain static standing 3min with unilateral UE support SPV  Outcome: Not Met  Note: Need further assessment      Problem: Mobility  Goal: STG-Within one week, patient will propel wheelchair community 400ft mod I indoors  Outcome: Not Met  Note: Dec activity tolerance   Goal: STG-Within one week, patient will ambulate up/down a curb with FWW mod assist  Outcome: Not Met  Note: NT, TBD, safety concerns     Problem: Mobility Transfers  Goal: STG-Within one week, patient will transfer bed to chair SBA SPT (5 out of 5 trials)  Outcome: Not Met  Note: CGA, safety      Problem: Mobility  Goal: STG-Within one week, patient will ambulate household distance 25ft x 4 with FWW SBA  Outcome: Progressing  Note: 60 ft  CGA, bradykinetic FWW, partially met

## 2023-09-05 NOTE — DISCHARGE PLANNING
CM spoke with patients daughter April to update on IDT and DC date of 9/11/23.  Answered questions.  CM emailed Access application to April to complete as much as possible.  CM will continue to monitor for DC needs.      17:00-CM met with patient to discuss IDT and DC date of 9/11/23.  Answered questions.  CM will continue to monitor for DC needs.      9/7/23: CM spoke with patients daughter April regarding RTC Access application.  She will drop it by the  later today.  CM available as needs arise.

## 2023-09-05 NOTE — CARE PLAN
Problem: Dressing  Goal: STG-Within one week, patient will dress LB with max A using AE  Outcome: Met  Note: Setup and SBA with AE     Problem: Toileting  Goal: STG-Within one week, patient will complete toileting tasks with max A   Outcome: Met  Note: CGA     Problem: Functional Transfers  Goal: STG-Within one week, patient will transfer to toilet with min A using grab bar and commode over toilet  Outcome: Met  Note: Min A

## 2023-09-05 NOTE — CARE PLAN
Problem: Bladder / Voiding  Goal: Patient will establish and maintain regular urinary output  Note: Pt. unable to void during this shift. Pt. was straight catheterized for 400 ml so far.      Problem: Skin Integrity  Goal: Patient's skin integrity will be maintained or improve  Note: Wound vac was removed today. Right hip incision clean, approximated with staples in place. Picture was updated.,      The patient is Stable - Low risk of patient condition declining or worsening.    Shift Goals  Clinical Goals: bladder mgmt, wound vac, sleep  Patient Goals: sleep

## 2023-09-05 NOTE — THERAPY
Occupational Therapy  Daily Treatment     Patient Name: Barbra العراقي  Age:  78 y.o., Sex:  female  Medical Record #: [id number]  Today's Date: 9/5/2023     Precautions  Precautions: (P) Fall Risk, Weight Bearing As Tolerated Right Lower Extremity  Comments: (P) wound vac R hip         Subjective    Patient was seated in w/c in her room and agreeable to shower/adl routine.     Objective       09/05/23 1031   OT Charge Group   OT Self Care / ADL (Units) 3   OT Therapeutic Exercise (Units) 1   OT Total Time Spent   OT Individual Total Time Spent (Mins) 60   Precautions   Precautions Fall Risk;Weight Bearing As Tolerated Right Lower Extremity   Comments wound vac R hip   Functional Level of Assist   Eating Independent   Grooming Independent;Seated   Bathing Minimal Assist   Bathing Description Grab bar;Hand held shower;Tub bench;Set-up of equipment;Supervision for safety;Verbal cueing   Upper Body Dressing Supervision   Lower Body Dressing Standby Assist   Lower Body Dressing Description Dressing stick;Reacher;Sock aid;Set-up of equipment;Supervision for safety;Verbal cueing;Increased time  (cues for problem solving)   Tub / Shower Transfers Minimal Assist   Tub Shower Transfer Description Grab bar;Shower bench;Requires lift;Set-up of equipment;Supervision for safety;Verbal cueing   Sitting Upper Body Exercises   Sitting Upper Body Exercises Yes   Lat Pull 3 sets of 10;Bilateral;Weight (See Comments for lbs)  (20, 25, 25 lbs)   Tricep Press 3 sets of 10;Bilateral;Weight (See Comments for lbs)  (25 lbs on rickshaw forward)   Interdisciplinary Plan of Care Collaboration   Patient Position at End of Therapy Seated;Chair Alarm On;Self Releasing Lap Belt Applied  (in dining room)         Assessment    Patient demonstrated improvements in independence with adl routine, but requires verbal cues for problem solving and AE.  Strengths: Able to follow instructions, Alert and oriented, Effective communication skills, Good insight into  deficits/needs, Independent prior level of function, Motivated for self care and independence, Pleasant and cooperative, Supportive family, Willingly participates in therapeutic activities  Barriers: Decreased endurance, Constipation, Generalized weakness, Impaired balance, Limited mobility, Pain    Plan    ADLs w/ AE prn, IADLs when able, functional mobility with FWW, strength/endurance, standing tolerance/balance    Occupational Therapy Goals (Active)       Problem: Dressing       Dates: Start:  08/31/23         Goal: STG-Within one week, patient will dress LB with max A using AE       Dates: Start:  08/31/23    Expected End:  09/22/23               Problem: Functional Transfers       Dates: Start:  08/31/23         Goal: STG-Within one week, patient will transfer to toilet with min A using grab bar and commode over toilet       Dates: Start:  08/31/23    Expected End:  09/22/23               Problem: OT Long Term Goals       Dates: Start:  08/31/23         Goal: LTG-By discharge, patient will complete basic self care tasks with mod I       Dates: Start:  08/31/23    Expected End:  09/22/23            Goal: LTG-By discharge, patient will perform bathroom transfers with mod I       Dates: Start:  08/31/23    Expected End:  09/22/23            Goal: LTG-By discharge, patient will complete basic home management with supervision/mod I       Dates: Start:  08/31/23    Expected End:  09/22/23               Problem: Toileting       Dates: Start:  08/31/23         Goal: STG-Within one week, patient will complete toileting tasks with max A        Dates: Start:  08/31/23    Expected End:  09/22/23

## 2023-09-05 NOTE — CARE PLAN
The patient is Stable - Low risk of patient condition declining or worsening    Shift Goals  Clinical Goals: bladder mgmt, wound vac, sleep  Patient Goals: sleep    Progress made toward(s) clinical / shift goals:      Problem: Bladder / Voiding  Goal: Patient will establish and maintain regular urinary output  Outcome: Progressing  Note: Pt is PVR due to UTI & on cipro. Pt has had 2 scans under 100 mL. Will continue to monitor pt

## 2023-09-05 NOTE — PROGRESS NOTES
Received shift report from day RN Tosha and assumed care of patient.  Patient awake, calm and stable, currently positioned in bed for comfort and safety; call light within reach.  Denies pain or discomfort at this time.  Will continue to monitor.

## 2023-09-06 ENCOUNTER — APPOINTMENT (OUTPATIENT)
Dept: OCCUPATIONAL THERAPY | Facility: REHABILITATION | Age: 78
DRG: 560 | End: 2023-09-06
Attending: PHYSICAL MEDICINE & REHABILITATION
Payer: MEDICARE

## 2023-09-06 ENCOUNTER — APPOINTMENT (OUTPATIENT)
Dept: PHYSICAL THERAPY | Facility: REHABILITATION | Age: 78
DRG: 560 | End: 2023-09-06
Attending: PHYSICAL MEDICINE & REHABILITATION
Payer: MEDICARE

## 2023-09-06 PROCEDURE — 97116 GAIT TRAINING THERAPY: CPT | Mod: CQ

## 2023-09-06 PROCEDURE — 97530 THERAPEUTIC ACTIVITIES: CPT | Mod: CQ

## 2023-09-06 PROCEDURE — A9270 NON-COVERED ITEM OR SERVICE: HCPCS | Performed by: PHYSICAL MEDICINE & REHABILITATION

## 2023-09-06 PROCEDURE — 97535 SELF CARE MNGMENT TRAINING: CPT

## 2023-09-06 PROCEDURE — 700111 HCHG RX REV CODE 636 W/ 250 OVERRIDE (IP): Performed by: PHYSICAL MEDICINE & REHABILITATION

## 2023-09-06 PROCEDURE — 97530 THERAPEUTIC ACTIVITIES: CPT

## 2023-09-06 PROCEDURE — 770010 HCHG ROOM/CARE - REHAB SEMI PRIVAT*

## 2023-09-06 PROCEDURE — 97110 THERAPEUTIC EXERCISES: CPT | Mod: CQ

## 2023-09-06 PROCEDURE — 97110 THERAPEUTIC EXERCISES: CPT

## 2023-09-06 PROCEDURE — 700102 HCHG RX REV CODE 250 W/ 637 OVERRIDE(OP): Performed by: PHYSICAL MEDICINE & REHABILITATION

## 2023-09-06 PROCEDURE — 99232 SBSQ HOSP IP/OBS MODERATE 35: CPT | Performed by: PHYSICAL MEDICINE & REHABILITATION

## 2023-09-06 RX ADMIN — TRAMADOL HYDROCHLORIDE 50 MG: 50 TABLET ORAL at 14:06

## 2023-09-06 RX ADMIN — DEXTROAMPHETAMINE SACCHARATE, AMPHETAMINE ASPARTATE MONOHYDRATE, DEXTROAMPHETAMINE SULFATE AND AMPHETAMINE SULFATE 30 MG: 7.5; 7.5; 7.5; 7.5 CAPSULE, EXTENDED RELEASE ORAL at 09:00

## 2023-09-06 RX ADMIN — SENNOSIDES AND DOCUSATE SODIUM 2 TABLET: 50; 8.6 TABLET ORAL at 08:15

## 2023-09-06 RX ADMIN — SENNOSIDES AND DOCUSATE SODIUM 2 TABLET: 50; 8.6 TABLET ORAL at 19:30

## 2023-09-06 RX ADMIN — TRAMADOL HYDROCHLORIDE 50 MG: 50 TABLET ORAL at 19:34

## 2023-09-06 RX ADMIN — Medication 4.5 MG: at 19:30

## 2023-09-06 RX ADMIN — ONDANSETRON 4 MG: 4 TABLET, ORALLY DISINTEGRATING ORAL at 14:06

## 2023-09-06 RX ADMIN — ENOXAPARIN SODIUM 40 MG: 100 INJECTION SUBCUTANEOUS at 18:19

## 2023-09-06 RX ADMIN — OMEPRAZOLE 20 MG: 20 CAPSULE, DELAYED RELEASE ORAL at 08:16

## 2023-09-06 RX ADMIN — CIPROFLOXACIN 500 MG: 500 TABLET, FILM COATED ORAL at 08:16

## 2023-09-06 RX ADMIN — POLYETHYLENE GLYCOL 3350 1 PACKET: 17 POWDER, FOR SOLUTION ORAL at 08:16

## 2023-09-06 RX ADMIN — CIPROFLOXACIN 500 MG: 500 TABLET, FILM COATED ORAL at 19:30

## 2023-09-06 RX ADMIN — Medication: at 19:32

## 2023-09-06 RX ADMIN — TRAMADOL HYDROCHLORIDE 50 MG: 50 TABLET ORAL at 08:17

## 2023-09-06 RX ADMIN — Medication: at 10:03

## 2023-09-06 ASSESSMENT — GAIT ASSESSMENTS
DEVIATION: ANTALGIC;BRADYKINETIC;DECREASED HEEL STRIKE;DECREASED TOE OFF
DISTANCE (FEET): 75
ASSISTIVE DEVICE: FRONT WHEEL WALKER
GAIT LEVEL OF ASSIST: CONTACT GUARD ASSIST

## 2023-09-06 ASSESSMENT — ACTIVITIES OF DAILY LIVING (ADL)
BED_CHAIR_WHEELCHAIR_TRANSFER_DESCRIPTION: ADAPTIVE EQUIPMENT;INCREASED TIME;SET-UP OF EQUIPMENT;SUPERVISION FOR SAFETY;VERBAL CUEING
BED_CHAIR_WHEELCHAIR_TRANSFER_DESCRIPTION: ADAPTIVE EQUIPMENT;SET-UP OF EQUIPMENT;SUPERVISION FOR SAFETY
TOILETING_LEVEL_OF_ASSIST_DESCRIPTION: GRAB BAR;SET-UP OF EQUIPMENT;SUPERVISION FOR SAFETY;VERBAL CUEING
TOILET_TRANSFER_DESCRIPTION: GRAB BAR;INCREASED TIME;REQUIRES LIFT;SET-UP OF EQUIPMENT;SUPERVISION FOR SAFETY;VERBAL CUEING
BED_CHAIR_WHEELCHAIR_TRANSFER_DESCRIPTION: ADAPTIVE EQUIPMENT;INCREASED TIME;SUPERVISION FOR SAFETY;VERBAL CUEING

## 2023-09-06 NOTE — THERAPY
"Physical Therapy   Daily Treatment     Patient Name: Barbra العراقي  Age:  78 y.o., Sex:  female  Medical Record #: [id number]  Today's Date: 9/6/2023     Precautions  Precautions: Fall Risk, Weight Bearing As Tolerated Right Lower Extremity  Comments: antalgia    Subjective    \"I'm fine\"     Objective       09/06/23 0901   PT Charge Group   PT Therapeutic Exercise (Units) 2   PT Total Time Spent   PT Individual Total Time Spent (Mins) 30   Pain   Intervention Cold Pack  (provided at end of session)   Pain 0 - 10 Group   Pain Rating Scale (NPRS)   (pt reports pain is bearable)   Transfer Functional Level of Assist   Bed, Chair, Wheelchair Transfer Contact Guard Assist   Bed Chair Wheelchair Transfer Description Assist with two limbs;Increased time;Verbal cueing  (SPT with no AD, pt uses bed rail)   Sitting Lower Body Exercises   Sit to Stand 3 sets of 10  (from progressively lowered plinth, CG with intermittent Diomedes for lower surfaces)   Standing Lower Body Exercises   Comments Pt stands with LUE on 2\" step, BUE support on w/c handles, to encourage RLE weight acceptance. Pt performs 10x R TKE in standing with CG throughout. Pt performs all of this x2   Bed Mobility    Sit to Supine Moderate Assist  (pt unable to coordinate)   Interdisciplinary Plan of Care Collaboration   IDT Collaboration with  Physical Therapist Assistant (PTA)   Patient Position at End of Therapy In Bed;Call Light within Reach;Tray Table within Reach     LE therex (seated, BLE):   - 30x ankle pumps  - 10x LAQ  - 10x marches    Assessment    Pt tolerated session well, focusing on transfers and RLE weight acceptance. Pt motivated.     Strengths: Able to follow instructions, Effective communication skills, Good insight into deficits/needs, Independent prior level of function, Motivated for self care and independence, Pleasant and cooperative, Supportive family, Willingly participates in therapeutic activities  Barriers: Decreased endurance, Generalized " weakness, Impaired activity tolerance, Impaired balance, Limited mobility, Pain, Pain poorly managed    Plan    Continue to progress bed mobility with use of leg , STS/transfer training, ambulation, LE strengthening    Passport items to be completed:  Get in/out of bed safely, in/out of a vehicle, safely use mobility device, walk or wheel around home/community, navigate up and down stairs, show how to get up/down from the ground, ensure home is accessible, demonstrate HEP, complete caregiver training    Physical Therapy Problems (Active)       Problem: Balance       Dates: Start:  08/31/23         Goal: STG-Within one week, patient will maintain static standing 3min with unilateral UE support SPV       Dates: Start:  08/31/23    Expected End:  09/22/23         Goal Note filed on 09/05/23 1235 by Madelyn Hairston DPT       Need further assessment                  Problem: Mobility       Dates: Start:  08/31/23         Goal: STG-Within one week, patient will propel wheelchair community 400ft mod I indoors       Dates: Start:  08/31/23    Expected End:  09/22/23         Goal Note filed on 09/05/23 1235 by Madelyn Hairston DPT       Dec activity tolerance               Goal: STG-Within one week, patient will ambulate household distance 25ft x 4 with FWW SBA       Dates: Start:  08/31/23    Expected End:  09/22/23         Goal Note filed on 09/05/23 1235 by Madelyn Hairston DPT       60 ft  CGA, bradykinetic FWW, partially met              Goal: STG-Within one week, patient will ambulate up/down a curb with FWW mod assist       Dates: Start:  08/31/23    Expected End:  09/22/23         Goal Note filed on 09/05/23 1235 by Madelyn Hairston DPT       NT, TBD, safety concerns                 Problem: Mobility Transfers       Dates: Start:  08/31/23         Goal: STG-Within one week, patient will transfer bed to chair SBA SPT (5 out of 5 trials)       Dates: Start:  08/31/23    Expected End:  09/22/23         Goal Note filed on  09/05/23 1235 by Madelyn Hairston DPT       CGA, safety                  Problem: PT-Long Term Goals       Dates: Start:  08/31/23         Goal: LTG-By discharge, patient will tolerate standing 5min at sink to complete ADLs mod I       Dates: Start:  08/31/23    Expected End:  09/22/23            Goal: LTG-By discharge, patient will ambulate 150ft x 4 with FWW mod I        Dates: Start:  08/31/23    Expected End:  09/22/23            Goal: LTG-By discharge, patient will transfer one surface to another mod I SPT safely and consistently       Dates: Start:  08/31/23    Expected End:  09/22/23            Goal: LTG-By discharge, patient will perform home exercise program seated/standing for LE strengthening to be done 3x/day in safe, independent home environment       Dates: Start:  08/31/23    Expected End:  09/22/23            Goal: LTG-By discharge, patient will up/down curb with FWW CGA       Dates: Start:  08/31/23    Expected End:  09/22/23

## 2023-09-06 NOTE — THERAPY
Occupational Therapy  Daily Treatment     Patient Name: Barbra العراقي  Age:  78 y.o., Sex:  female  Medical Record #: [id number]  Today's Date: 9/6/2023     Precautions  Precautions: Fall Risk, Weight Bearing As Tolerated Right Lower Extremity  Comments: antalgia         Subjective    Patient was in bed upon OT arrival getting lotion applied on legs by RN. Agreeable to OT.     Objective       09/06/23 1001   OT Charge Group   OT Self Care / ADL (Units) 1   OT Therapy Activity (Units) 2   OT Therapeutic Exercise (Units) 1   OT Total Time Spent   OT Individual Total Time Spent (Mins) 60   Precautions   Precautions Fall Risk;Weight Bearing As Tolerated Right Lower Extremity   Functional Level of Assist   Grooming Independent;Seated   Lower Body Dressing Standby Assist   Lower Body Dressing Description Sock aid;Set-up of equipment;Supervision for safety  (to don socks with sock aide)   Toileting Standby Assist   Toileting Description Grab bar;Set-up of equipment;Supervision for safety;Verbal cueing   Bed, Chair, Wheelchair Transfer Standby Assist   Bed Chair Wheelchair Transfer Description Adaptive equipment;Set-up of equipment;Supervision for safety  (sba with fww bed to w/c)   Toilet Transfers Minimal Assist   Toilet Transfer Description Grab bar;Increased time;Requires lift;Set-up of equipment;Supervision for safety;Verbal cueing  (min lifting assist with cues, gb)   Sitting Upper Body Exercises   Sitting Upper Body Exercises Yes   Upper Extremity Bike Minutes / Rest Breaks (See Comments)  (motomed cycle gear 3 x 10 minutes)   IADL Treatments   IADL Treatments Kitchen mobility education   Kitchen Mobility Education Patient educated on safe kitchen mobility techniques using counter, FWW for support while reaching into cabinets and appliances for objects.  She then mobilized around kitchen with sba and FWW using counter prn with one verbal cue for safety.   Bed Mobility    Supine to Sit Standby Assist  (leg )    Scooting Standby Assist   Interdisciplinary Plan of Care Collaboration   Patient Position at End of Therapy In Bed;Call Light within Reach;Tray Table within Reach;Phone within Reach;Bed Alarm On         Assessment    Patient tolerated all activities well and without complaints.  She is still requiring min A for lifting assist from toilet.    Strengths: Able to follow instructions, Alert and oriented, Effective communication skills, Good insight into deficits/needs, Independent prior level of function, Motivated for self care and independence, Pleasant and cooperative, Supportive family, Willingly participates in therapeutic activities  Barriers: Decreased endurance, Constipation, Generalized weakness, Impaired balance, Limited mobility, Pain    Plan    ADLs w/ AE prn, IADLs when able, functional mobility with FWW, strength/endurance, standing tolerance/balance    Occupational Therapy Goals (Active)       Problem: Dressing       Dates: Start:  09/05/23         Goal: STG-Within one week, patient will dress LB with supervision using AE       Dates: Start:  09/05/23               Problem: Functional Transfers       Dates: Start:  09/05/23         Goal: STG-Within one week, patient will transfer to toilet with SBA       Dates: Start:  09/05/23               Problem: OT Long Term Goals       Dates: Start:  08/31/23         Goal: LTG-By discharge, patient will complete basic self care tasks with mod I       Dates: Start:  08/31/23    Expected End:  09/22/23            Goal: LTG-By discharge, patient will perform bathroom transfers with mod I       Dates: Start:  08/31/23    Expected End:  09/22/23            Goal: LTG-By discharge, patient will complete basic home management with supervision/mod I       Dates: Start:  08/31/23    Expected End:  09/22/23               Problem: Toileting       Dates: Start:  09/05/23         Goal: STG-Within one week, patient will complete toileting tasks with supervision       Dates:  Start:  09/05/23

## 2023-09-06 NOTE — THERAPY
"Physical Therapy   Daily Treatment     Patient Name: Barbra العراقي  Age:  78 y.o., Sex:  female  Medical Record #: [id number]  Today's Date: 9/6/2023     Precautions  Precautions: Fall Risk, Weight Bearing As Tolerated Right Lower Extremity  Comments: antalgia    Subjective    \"Are you going to put the belt on me?\"         Objective       09/06/23 1301   PT Charge Group   PT Gait Training (Units) 1   PT Therapeutic Exercise (Units) 1   PT Therapeutic Activities (Units) 2   Supervising Physical Therapist aPige Dickey   PT Total Time Spent   PT Individual Total Time Spent (Mins) 60   Pain   Intervention Nurse Notified;Ambulation / Increased Activity   Pain 0 - 10 Group   Location Hip   Location Orientation Right   Description Sore;Aching   Comfort Goal Comfort with Movement;Perform Activity   Therapist Pain Assessment Post Activity Pain Same as Prior to Activity;Nurse Notified   Transfer Functional Level of Assist   Bed, Chair, Wheelchair Transfer Standby Assist   Bed Chair Wheelchair Transfer Description Adaptive equipment;Increased time;Set-up of equipment;Supervision for safety;Verbal cueing   Toilet Transfers Minimal Assist   Toilet Transfer Description Grab bar;Increased time;Requires lift;Other (comment);Supervision for safety;Verbal cueing  (min lifting assist from toilet)   Standing Lower Body Exercises   Standing Lower Body Exercises Yes   Hip Flexion 1 set of 10;Bilateral   Marching 1 set of 10   Heel Rise 1 set of 10;Bilateral   Mini Squat Partial;1 set of 10   Other Exercises side stepping in // 4 x 8' with pink resistance band proximal to B knees   Neuro-Muscular Treatments   Neuro-Muscular Treatments Postural Facilitation;Sequencing;Verbal Cuing   Comments see note   Interdisciplinary Plan of Care Collaboration   IDT Collaboration with  Nursing;Certified Nursing Assistant   Patient Position at End of Therapy Seated;Self Releasing Lap Belt Applied;Phone within Reach;Tray Table within Reach   Collaboration " "Comments pain pill, nausea, PVR, vitals     Gait training/there act  Standing in // with B UE support stepping over half foam bolster 1 x 10 with R and 1 x 10 with L, vc for weight shifting onto the leading LE  Rolling small foam roller under R foot 2 x 10 reps        Dc planning discussion about upcoming dc; pt's daughter April and the patient use RTC for transportation, I have advised the patient to see if her daughter can come in day of DC for a short caregiver training session for her to observe pt amb and to learn how to assist her with getting into her apt. Barbra will let her daughter know what we talked about.    Pt may benefit from Access application for community transportation    Assessment    Pt tolerated ts session well, she continues to be self limiting in anticipation of pain and exhibits significant fear of falling. Pt requires extra time for functional tasks, motivated to cont to progress mobility and return to PLOF. Pt is progressing from CGA to SBA and responds well to positive affirmation. Barbra lives alone in a senior apt and will need to be [place] before DC, she said her daughter may be able to stay with her for 1 night.     Strengths: Able to follow instructions, Effective communication skills, Good insight into deficits/needs, Independent prior level of function, Motivated for self care and independence, Pleasant and cooperative, Supportive family, Willingly participates in therapeutic activities  Barriers: Decreased endurance, Generalized weakness, Impaired activity tolerance, Impaired balance, Limited mobility, Pain, Pain poorly managed    Plan    Cont to promote self efficacy with mobility, cont to progress bed mobility with use of leg , STS/transfer training, ambulation, LE strengthening(hip abductors) improved R LE wbing and gait mechanics, 4\" platform step with FWW     Passport items to be completed:  Get in/out of bed safely, in/out of a vehicle, safely use mobility device, walk or " wheel around home/community, navigate up and down stairs, show how to get up/down from the ground, ensure home is accessible, demonstrate HEP, complete caregiver training    Passport items to be completed:  Get in/out of bed safely, in/out of a vehicle, safely use mobility device, walk or wheel around home/community, navigate up and down stairs, show how to get up/down from the ground, ensure home is accessible, demonstrate HEP, complete caregiver training    Physical Therapy Problems (Active)       Problem: Balance       Dates: Start:  08/31/23         Goal: STG-Within one week, patient will maintain static standing 3min with unilateral UE support SPV       Dates: Start:  08/31/23    Expected End:  09/22/23         Goal Note filed on 09/05/23 1235 by Madelyn Hairston DPT       Need further assessment                  Problem: Mobility       Dates: Start:  08/31/23         Goal: STG-Within one week, patient will propel wheelchair community 400ft mod I indoors       Dates: Start:  08/31/23    Expected End:  09/22/23         Goal Note filed on 09/05/23 1235 by Madelyn Hairston DPT       Dec activity tolerance               Goal: STG-Within one week, patient will ambulate household distance 25ft x 4 with FWW SBA       Dates: Start:  08/31/23    Expected End:  09/22/23         Goal Note filed on 09/05/23 1235 by Madelyn Hairston DPT       60 ft  CGA, bradykinetic FWW, partially met              Goal: STG-Within one week, patient will ambulate up/down a curb with FWW mod assist       Dates: Start:  08/31/23    Expected End:  09/22/23         Goal Note filed on 09/05/23 1235 by Madelyn Hairston DPT       NT, TBD, safety concerns                 Problem: Mobility Transfers       Dates: Start:  08/31/23         Goal: STG-Within one week, patient will transfer bed to chair SBA SPT (5 out of 5 trials)       Dates: Start:  08/31/23    Expected End:  09/22/23         Goal Note filed on 09/05/23 1235 by Madelyn Hairston DPT       CGA,  safety                  Problem: PT-Long Term Goals       Dates: Start:  08/31/23         Goal: LTG-By discharge, patient will tolerate standing 5min at sink to complete ADLs mod I       Dates: Start:  08/31/23    Expected End:  09/22/23            Goal: LTG-By discharge, patient will ambulate 150ft x 4 with FWW mod I        Dates: Start:  08/31/23    Expected End:  09/22/23            Goal: LTG-By discharge, patient will transfer one surface to another mod I SPT safely and consistently       Dates: Start:  08/31/23    Expected End:  09/22/23            Goal: LTG-By discharge, patient will perform home exercise program seated/standing for LE strengthening to be done 3x/day in safe, independent home environment       Dates: Start:  08/31/23    Expected End:  09/22/23            Goal: LTG-By discharge, patient will up/down curb with FWW CGA       Dates: Start:  08/31/23    Expected End:  09/22/23

## 2023-09-06 NOTE — PROGRESS NOTES
NURSING DAILY NOTE    Name: Barbra العراقي   Date of Admission: 8/30/2023   Admitting Diagnosis: Closed right hip fracture, initial encounter ([facility])  Attending Physician: Sara Diaz M.d.  Allergies: Ampicillin and Keflex [cephalexin]    Safety  Patient Assist  Mod assist  Patient Precautions  Fall Risk, Weight Bearing As Tolerated Right Lower Extremity  Precaution Comments  wound vac R hip  Bed Transfer Status  Contact Guard Assist  Toilet Transfer Status   Minimal Assist (CGA-min A)  Assistive Devices  Gait Belt, Rails, Wheelchair  Oxygen  None - Room Air  Diet/Therapeutic Dining  Current Diet Order   Procedures    Diet Order Diet: Regular     Pill Administration  whole  Agitated Behavioral Scale     ABS Level of Severity       Fall Risk  Has the patient had a fall this admission?   No  Amie Wright Fall Risk Scoring  16, HIGH RISK  Fall Risk Safety Measures  bed alarm, chair alarm, and poor balance    Vitals  Temperature: 37.2 °C (98.9 °F)  Temp src: Oral  Pulse: 94  Respiration: 18  Blood Pressure : (!) 142/65  Blood Pressure MAP (Calculated): 91 MM HG  BP Location: Right, Upper Arm  Patient BP Position: Supine     Oxygen  Pulse Oximetry: 95 %  O2 (LPM): 0  O2 Delivery Device: None - Room Air    Bowel and Bladder  Last Bowel Movement  09/05/23  Stool Type  Type 4: Like a sausage or snake, smooth and soft  Bowel Device  Bathroom  Continent  Bladder: Did not void (torres in place)   Bowel: Continent movement  Bladder Function  Urine Void (mL):  (small vd)  Number of Times Voided: 2  Urine Color: Unable To Evaluate  Urine Clarity: Clear  Number of Times Incontinent of Urine: 1  Straight Catheter: 420 ml  Genitourinary Assessment   Bladder Assessment (WDL):  WDL Except  Torres Catheter: Present with Active Order  Torres Reasons per MD Order: Acute urinary retention or bladder outlet obstruction  Torres Care: Given with Soap and Water  Urinary Elimination:  Catheter (Document on LDA)  Urine Color: Unable To Evaluate  Urine Clarity: Clear  Number of Times Incontinent of Urine: 1  Bladder Device: Other Bladder Device (Comment)  Bladder Scan: Unable To Void  $ Bladder Scan Results (mL): 493  Bladder Medications: Other (Comments)    Skin  Escobar Score   16  Sensory Interventions   Bed Types: Standard/Trauma Mattress with Overlay  Skin Preventative Measures: Pillows in Use for Support / Positioning  Moisture Interventions  Moisturizers/Barriers: Barrier Paste      Pain  Pain Rating Scale  0 - No Pain  Pain Location  Leg  Pain Location Orientation  Right  Pain Interventions   Medication (see MAR) (pt wants to have her muscle relaxer for NOC med pass to decrease pain)    ADLs    Bathing   Patient Refused Bathing (pt refused shower)  Linen Change   Partial  Personal Hygiene  Change Yaritza Pads, Moist Yaritza Wipes, Perineal Care  Chlorhexidine Bath      Oral Care  Brushed Teeth  Teeth/Dentures  Missing Teeth (Comments)  Shave     Nutrition Percentage Eaten  Lunch, Between % Consumed  Environmental Precautions  Treaded Slipper Socks on Patient, Personal Belongings, Wastebasket, Call Bell etc. in Easy Reach, Transferred to Stronger Side, Bed in Low Position  Patient Turns/Positioning  Patient Turns Self from Side to Side  Patient Turns Assistance/Tolerance  Assistance of One  Bed Positions  Bed Locked, Bed Controls On  Head of Bed Elevated  Self regulated      Psychosocial/Neurologic Assessment  Psychosocial Assessment  Psychosocial (WDL):  Within Defined Limits  Patient Behaviors: Anxious  Neurologic Assessment  Neuro (WDL): Exceptions to WDL  Level of Consciousness: Alert  Orientation Level: Oriented X4  Cognition: Follows commands, Appropriate attention/concentration  Speech: Clear  Motor Function/Sensation Assessment: Motor response  R Foot Dorsiflexion: Moderate  L Foot Dorsiflexion: Weak  Muscle Strength Right Arm: Good Strength Against Gravity and Moderate  Resistance  Muscle Strength Left Arm: Good Strength Against Gravity and Moderate Resistance  Muscle Strength Right Leg: Fair Strength against Gravity but No Resistance  Muscle Strength Left Leg: Fair Strength against Gravity but No Resistance  Neuro Additional Assessments: David Coma Scale  EENT (WDL):  WDL Except    Cardio/Pulmonary Assessment  Edema      Respiratory Breath Sounds  RUL Breath Sounds: Clear  RML Breath Sounds: Diminished  RLL Breath Sounds: Diminished  AZ Breath Sounds: Clear  LLL Breath Sounds: Diminished  Cardiac Assessment   Cardiac (WDL):  Within Defined Limits

## 2023-09-06 NOTE — CARE PLAN
The patient is Watcher - Medium risk of patient condition declining or worsening    Shift Goals  Clinical Goals: bladder mgmt, wound vac, sleep  Patient Goals: sleep    Progress made toward(s) clinical / shift goals:    Problem: Knowledge Deficit - Standard  Goal: Patient and family/care givers will demonstrate understanding of plan of care, disease process/condition, diagnostic tests and medications  Outcome: Progressing     Problem: Psychosocial  Goal: Patient's level of anxiety will decrease  Outcome: Progressing     Problem: Neurogenic Bladder  Goal: Patient will demonstrate self-cath technique using clean technique and care of the catheter  Note: Unable to perform self-cath. PVR more than 400. ICP as ordered.

## 2023-09-06 NOTE — PROGRESS NOTES
NURSING DAILY NOTE    Name: Barbra العراقي   Date of Admission: 8/30/2023   Admitting Diagnosis: Closed right hip fracture, initial encounter ([facility])  Attending Physician: Sara Diaz M.d.  Allergies: Ampicillin and Keflex [cephalexin]    Safety  Patient Assist  Mod assist  Patient Precautions  Fall Risk, Weight Bearing As Tolerated Right Lower Extremity  Precaution Comments  wound vac R hip  Bed Transfer Status  Contact Guard Assist  Toilet Transfer Status   Minimal Assist (CGA-min A)  Assistive Devices  Gait Belt, Rails, Wheelchair  Oxygen  None - Room Air  Diet/Therapeutic Dining  Current Diet Order   Procedures    Diet Order Diet: Regular     Pill Administration  whole  Agitated Behavioral Scale     ABS Level of Severity       Fall Risk  Has the patient had a fall this admission?   No  Amie Wright Fall Risk Scoring  16, HIGH RISK  Fall Risk Safety Measures  bed alarm and chair alarm    Vitals  Temperature: 36.7 °C (98 °F)  Temp src: Oral  Pulse: (!) 103  Respiration: 18  Blood Pressure : 130/71  Blood Pressure MAP (Calculated): 91 MM HG  BP Location: Right, Upper Arm  Patient BP Position: Supine     Oxygen  Pulse Oximetry: 99 %  O2 (LPM): 0  O2 Delivery Device: None - Room Air    Bowel and Bladder  Last Bowel Movement  09/04/23  Stool Type  Type 4: Like a sausage or snake, smooth and soft  Bowel Device  Bathroom  Continent  Bladder: Did not void (torres in place)   Bowel: Continent movement  Bladder Function  Urine Void (mL):  (moderate)  Number of Times Voided: 1  Urine Color: Yellow  Urine Clarity: Clear  Number of Times Incontinent of Urine: 1  Straight Catheter: 400 ml  Genitourinary Assessment   Bladder Assessment (WDL):  WDL Except  Torres Catheter: Present with Active Order  Torres Reasons per MD Order: Acute urinary retention or bladder outlet obstruction  Torres Care: Given with Soap and Water  Urinary Elimination: Catheter (Document on  LDA)  Urine Color: Yellow  Urine Clarity: Clear  Number of Times Incontinent of Urine: 1  Bladder Device: Other Bladder Device (Comment)  Bladder Scan:  (unable to vd at this time)  $ Bladder Scan Results (mL): 138  Bladder Medications: Other (Comments)    Skin  Escobar Score   16  Sensory Interventions   Bed Types: Standard/Trauma Mattress  Skin Preventative Measures: Pillows in Use for Support / Positioning  Moisture Interventions  Moisturizers/Barriers: Barrier Paste      Pain  Pain Rating Scale  7 - Focus of attention, prevents doing daily activities  Pain Location  Leg  Pain Location Orientation  Right  Pain Interventions   Medication (see MAR) (pt wants to have her muscle relaxer for NOC med pass to decrease pain)    ADLs    Bathing   Patient Refused Bathing (pt refused shower)  Linen Change   Partial  Personal Hygiene  Change Yaritza Pads, Moist Yaritza Wipes, Perineal Care  Chlorhexidine Bath      Oral Care  Brushed Teeth  Teeth/Dentures  Missing Teeth (Comments)  Shave     Nutrition Percentage Eaten  Lunch, Between % Consumed  Environmental Precautions  Treaded Slipper Socks on Patient, Personal Belongings, Wastebasket, Call Bell etc. in Easy Reach, Transferred to Stronger Side, Bed in Low Position  Patient Turns/Positioning  Patient Turns Self from Side to Side  Patient Turns Assistance/Tolerance  Assistance of One  Bed Positions  Bed Locked, Bed Controls On  Head of Bed Elevated  Self regulated      Psychosocial/Neurologic Assessment  Psychosocial Assessment  Psychosocial (WDL):  WDL Except  Patient Behaviors: Anxious  Neurologic Assessment  Neuro (WDL): Exceptions to WDL  Level of Consciousness: Alert  Orientation Level: Oriented X4  Cognition: Follows commands, Appropriate attention/concentration  Speech: Clear  Motor Function/Sensation Assessment: Motor response  R Foot Dorsiflexion: Moderate  L Foot Dorsiflexion: Weak  Muscle Strength Right Arm: Good Strength Against Gravity and Moderate  Resistance  Muscle Strength Left Arm: Good Strength Against Gravity and Moderate Resistance  Muscle Strength Right Leg: Fair Strength against Gravity but No Resistance  Muscle Strength Left Leg: Fair Strength against Gravity but No Resistance  Neuro Additional Assessments: David Coma Scale  EENT (WDL):  WDL Except    Cardio/Pulmonary Assessment  Edema      Respiratory Breath Sounds  RUL Breath Sounds: Clear  RML Breath Sounds: Diminished  RLL Breath Sounds: Diminished  AZ Breath Sounds: Clear  LLL Breath Sounds: Diminished  Cardiac Assessment   Cardiac (WDL):  Within Defined Limits

## 2023-09-06 NOTE — PROGRESS NOTES
"  Physical Medicine & Rehabilitation Progress Note    Encounter Date: 9/6/2023    Chief Complaint: Decreased mobility, weakness    Interval Events (Subjective):  Patient sitting up in room. She reports therapy is going well. She denies NVD. Denies SOB.     _____________________________________  Interdisciplinary Team Conference   Most recent IDT on 9/5/2023    Discharge Date/Disposition:  8/11/23  _____________________________________    Objective:  VITAL SIGNS: /56   Pulse 72   Temp 36.4 °C (97.6 °F) (Oral)   Resp 16   Ht 1.651 m (5' 5\")   Wt 61.7 kg (136 lb)   SpO2 98%   BMI 22.63 kg/m²   Gen: NAD  Psych: Mood and affect appropriate  CV: RRR, 1+ RLE edema  Resp: CTAB, no upper airway sounds  Abd: NTND  Neuro: AOx4, following commands  Unchanged from 9/6/23    Laboratory Values:  Recent Results (from the past 72 hour(s))   CBC WITH DIFFERENTIAL    Collection Time: 09/04/23  5:53 AM   Result Value Ref Range    WBC 8.1 4.8 - 10.8 K/uL    RBC 3.19 (L) 4.20 - 5.40 M/uL    Hemoglobin 9.4 (L) 12.0 - 16.0 g/dL    Hematocrit 28.9 (L) 37.0 - 47.0 %    MCV 90.6 81.4 - 97.8 fL    MCH 29.5 27.0 - 33.0 pg    MCHC 32.5 32.2 - 35.5 g/dL    RDW 43.6 35.9 - 50.0 fL    Platelet Count 301 164 - 446 K/uL    MPV 9.6 9.0 - 12.9 fL    Neutrophils-Polys 66.00 44.00 - 72.00 %    Lymphocytes 18.80 (L) 22.00 - 41.00 %    Monocytes 8.80 0.00 - 13.40 %    Eosinophils 2.10 0.00 - 6.90 %    Basophils 0.50 0.00 - 1.80 %    Immature Granulocytes 3.80 (H) 0.00 - 0.90 %    Nucleated RBC 0.00 0.00 - 0.20 /100 WBC    Neutrophils (Absolute) 5.32 1.82 - 7.42 K/uL    Lymphs (Absolute) 1.52 1.00 - 4.80 K/uL    Monos (Absolute) 0.71 0.00 - 0.85 K/uL    Eos (Absolute) 0.17 0.00 - 0.51 K/uL    Baso (Absolute) 0.04 0.00 - 0.12 K/uL    Immature Granulocytes (abs) 0.31 (H) 0.00 - 0.11 K/uL    NRBC (Absolute) 0.00 K/uL   Basic Metabolic Panel    Collection Time: 09/04/23  5:53 AM   Result Value Ref Range    Sodium 134 (L) 135 - 145 mmol/L    " Potassium 4.2 3.6 - 5.5 mmol/L    Chloride 97 96 - 112 mmol/L    Co2 27 20 - 33 mmol/L    Glucose 114 (H) 65 - 99 mg/dL    Bun 17 8 - 22 mg/dL    Creatinine 0.58 0.50 - 1.40 mg/dL    Calcium 8.6 8.5 - 10.5 mg/dL    Anion Gap 10.0 7.0 - 16.0   ESTIMATED GFR    Collection Time: 09/04/23  5:53 AM   Result Value Ref Range    GFR (CKD-EPI) 91 >60 mL/min/1.73 m 2       Medications:  Scheduled Medications   Medication Dose Frequency    amphetamine-dextroamphetamine ER  30 mg QAM    ciprofloxacin  500 mg Q12HRS    enoxaparin (LOVENOX) injection  40 mg DAILY AT 1800    Pharmacy Consult Request  1 Each PHARMACY TO DOSE    omeprazole  20 mg DAILY    ammonium lactate   BID    senna-docusate  2 Tablet BID    And    polyethylene glycol/lytes  1 Packet DAILY    melatonin  4.5 mg QHS     PRN medications: traMADol, traMADol, Respiratory Therapy Consult, hydrALAZINE, carboxymethylcellulose, benzocaine-menthol, mag hydrox-al hydrox-simeth, ondansetron **OR** ondansetron, traZODone, sodium chloride, acetaminophen, senna-docusate **AND** polyethylene glycol/lytes **AND** magnesium hydroxide **AND** bisacodyl    Diet:  Current Diet Order   Procedures    Diet Order Diet: Regular       Medical Decision Making and Plan:  Right Hip Per-Prosthetic Fracture s/p ORIF Dr. Galvan 8/29/23  PT and OT for mobility and ADLs. Per guidelines, 15 hours per week between PT, OT and/or SLP.  Follow-up Ortho  WBAT RLE  [place] on until outpatient follow up  Sutures out 10-14 days post-op     Pain  PRN Tylenol + Oxycodone. Continue Oxycodone     ABLA  Hgb drop 9.3-->8.8   Repeat 9.4, will check Fe panel     Depression  No anti-depressant at home     Hyperglycemia  Order Hgb A1c - 5.8  Monitor     Hypotension  Abdominal binder      ADHD   Add home Adderall XR 30mg daily when patient dtr brings.   9/1 Schedule Ritalin 10mg daily - qAM  9/3 Stop Ritalin, add patient's home Adderall. Continue Adderall      Skin  Patient at risk for skin breakdown due to  debility in areas including sacrum, achilles, elbows and head in addition to other sites. Nursing to assess skin daily.      GI Ppx  Patient on Prilosec for GERD prophylaxis.      Sleep  Melatonin     Bowel   Patient on Senna-docusate for constipation prophylaxis.   Last BM: 9/1/23     Bladder  Proteus Mirabilis UTI on admission  IDFC - remove if no contraindication  Start Ciprofloxacin 8/31, continue 9/1 x 5 days, reviewed sensitivities and Cipro ok  9/3 requiring CIC. Continue Ciprofloxacin     DVT PROPHYLAXIS: Lovenox 30mg SQ q12hrs on admission --> 40mg SQ daily. Continue Lovenox  Switch to ASA 80mg nusrat on D/c per Ortho     HOSPITALIST FOLLOWING: No     CODE STATUS: FULL CODE     DISPO: Lives alone in 1 story apartment on second floor. Elevator present. Daughter can help intermittently.   ____________________________________    T. Don Diaz MD/PhD  [facility] - Physical Medicine & Rehabilitation   [facility] - Brain Injury Medicine   ____________________________________

## 2023-09-07 ENCOUNTER — APPOINTMENT (OUTPATIENT)
Dept: OCCUPATIONAL THERAPY | Facility: REHABILITATION | Age: 78
DRG: 560 | End: 2023-09-07
Attending: PHYSICAL MEDICINE & REHABILITATION
Payer: MEDICARE

## 2023-09-07 ENCOUNTER — APPOINTMENT (OUTPATIENT)
Dept: PHYSICAL THERAPY | Facility: REHABILITATION | Age: 78
DRG: 560 | End: 2023-09-07
Attending: PHYSICAL MEDICINE & REHABILITATION
Payer: MEDICARE

## 2023-09-07 LAB
ANION GAP SERPL CALC-SCNC: 8 MMOL/L (ref 7–16)
BUN SERPL-MCNC: 20 MG/DL (ref 8–22)
CALCIUM SERPL-MCNC: 8.4 MG/DL (ref 8.5–10.5)
CHLORIDE SERPL-SCNC: 102 MMOL/L (ref 96–112)
CO2 SERPL-SCNC: 27 MMOL/L (ref 20–33)
CREAT SERPL-MCNC: 0.53 MG/DL (ref 0.5–1.4)
ERYTHROCYTE [DISTWIDTH] IN BLOOD BY AUTOMATED COUNT: 45.7 FL (ref 35.9–50)
GFR SERPLBLD CREATININE-BSD FMLA CKD-EPI: 93 ML/MIN/1.73 M 2
GLUCOSE SERPL-MCNC: 102 MG/DL (ref 65–99)
HCT VFR BLD AUTO: 28 % (ref 37–47)
HGB BLD-MCNC: 8.8 G/DL (ref 12–16)
HGB RETIC QN AUTO: 31.9 PG/CELL (ref 29–35)
IMM RETICS NFR: 21.1 % (ref 2.6–16.1)
IRON SATN MFR SERPL: 18 % (ref 15–55)
IRON SERPL-MCNC: 33 UG/DL (ref 40–170)
MAGNESIUM SERPL-MCNC: 1.8 MG/DL (ref 1.5–2.5)
MCH RBC QN AUTO: 29.6 PG (ref 27–33)
MCHC RBC AUTO-ENTMCNC: 31.4 G/DL (ref 32.2–35.5)
MCV RBC AUTO: 94.3 FL (ref 81.4–97.8)
PLATELET # BLD AUTO: 338 K/UL (ref 164–446)
PMV BLD AUTO: 9.6 FL (ref 9–12.9)
POTASSIUM SERPL-SCNC: 4.4 MMOL/L (ref 3.6–5.5)
RBC # BLD AUTO: 2.97 M/UL (ref 4.2–5.4)
RETICS # AUTO: 0.12 M/UL (ref 0.04–0.12)
RETICS/RBC NFR: 4.1 % (ref 0.8–2.6)
SODIUM SERPL-SCNC: 137 MMOL/L (ref 135–145)
TIBC SERPL-MCNC: 187 UG/DL (ref 250–450)
UIBC SERPL-MCNC: 154 UG/DL (ref 110–370)
WBC # BLD AUTO: 9 K/UL (ref 4.8–10.8)

## 2023-09-07 PROCEDURE — 80048 BASIC METABOLIC PNL TOTAL CA: CPT

## 2023-09-07 PROCEDURE — 97110 THERAPEUTIC EXERCISES: CPT | Mod: CQ

## 2023-09-07 PROCEDURE — 700111 HCHG RX REV CODE 636 W/ 250 OVERRIDE (IP): Mod: JZ | Performed by: PHYSICAL MEDICINE & REHABILITATION

## 2023-09-07 PROCEDURE — 36415 COLL VENOUS BLD VENIPUNCTURE: CPT

## 2023-09-07 PROCEDURE — 99232 SBSQ HOSP IP/OBS MODERATE 35: CPT | Performed by: PHYSICAL MEDICINE & REHABILITATION

## 2023-09-07 PROCEDURE — 85046 RETICYTE/HGB CONCENTRATE: CPT

## 2023-09-07 PROCEDURE — 700102 HCHG RX REV CODE 250 W/ 637 OVERRIDE(OP): Performed by: PHYSICAL MEDICINE & REHABILITATION

## 2023-09-07 PROCEDURE — 83540 ASSAY OF IRON: CPT

## 2023-09-07 PROCEDURE — A9270 NON-COVERED ITEM OR SERVICE: HCPCS | Performed by: PHYSICAL MEDICINE & REHABILITATION

## 2023-09-07 PROCEDURE — 83735 ASSAY OF MAGNESIUM: CPT

## 2023-09-07 PROCEDURE — 85027 COMPLETE CBC AUTOMATED: CPT

## 2023-09-07 PROCEDURE — 97116 GAIT TRAINING THERAPY: CPT | Mod: CQ

## 2023-09-07 PROCEDURE — 83550 IRON BINDING TEST: CPT

## 2023-09-07 PROCEDURE — 97530 THERAPEUTIC ACTIVITIES: CPT | Mod: CQ

## 2023-09-07 PROCEDURE — 97530 THERAPEUTIC ACTIVITIES: CPT

## 2023-09-07 PROCEDURE — 770010 HCHG ROOM/CARE - REHAB SEMI PRIVAT*

## 2023-09-07 RX ORDER — FERROUS SULFATE 325(65) MG
325 TABLET ORAL
Status: DISCONTINUED | OUTPATIENT
Start: 2023-09-08 | End: 2023-09-11 | Stop reason: HOSPADM

## 2023-09-07 RX ORDER — HYDROCODONE BITARTRATE AND ACETAMINOPHEN 5; 325 MG/1; MG/1
1 TABLET ORAL EVERY 4 HOURS PRN
Status: DISCONTINUED | OUTPATIENT
Start: 2023-09-07 | End: 2023-09-11 | Stop reason: HOSPADM

## 2023-09-07 RX ADMIN — TRAMADOL HYDROCHLORIDE 50 MG: 50 TABLET ORAL at 08:21

## 2023-09-07 RX ADMIN — SENNOSIDES AND DOCUSATE SODIUM 2 TABLET: 50; 8.6 TABLET ORAL at 20:22

## 2023-09-07 RX ADMIN — CIPROFLOXACIN 500 MG: 500 TABLET, FILM COATED ORAL at 08:21

## 2023-09-07 RX ADMIN — Medication: at 20:25

## 2023-09-07 RX ADMIN — SENNOSIDES AND DOCUSATE SODIUM 2 TABLET: 50; 8.6 TABLET ORAL at 08:21

## 2023-09-07 RX ADMIN — DEXTROAMPHETAMINE SACCHARATE, AMPHETAMINE ASPARTATE MONOHYDRATE, DEXTROAMPHETAMINE SULFATE AND AMPHETAMINE SULFATE 30 MG: 7.5; 7.5; 7.5; 7.5 CAPSULE, EXTENDED RELEASE ORAL at 09:00

## 2023-09-07 RX ADMIN — Medication 4.5 MG: at 20:22

## 2023-09-07 RX ADMIN — HYDROCODONE BITARTRATE AND ACETAMINOPHEN 1 TABLET: 5; 325 TABLET ORAL at 20:22

## 2023-09-07 RX ADMIN — OMEPRAZOLE 20 MG: 20 CAPSULE, DELAYED RELEASE ORAL at 08:21

## 2023-09-07 RX ADMIN — POLYETHYLENE GLYCOL 3350 1 PACKET: 17 POWDER, FOR SOLUTION ORAL at 08:26

## 2023-09-07 RX ADMIN — ENOXAPARIN SODIUM 40 MG: 100 INJECTION SUBCUTANEOUS at 17:41

## 2023-09-07 RX ADMIN — HYDROCODONE BITARTRATE AND ACETAMINOPHEN 1 TABLET: 5; 325 TABLET ORAL at 15:57

## 2023-09-07 RX ADMIN — Medication: at 08:26

## 2023-09-07 ASSESSMENT — GAIT ASSESSMENTS
GAIT LEVEL OF ASSIST: STANDBY ASSIST
DEVIATION: ANTALGIC;STEP TO;DECREASED BASE OF SUPPORT;BRADYKINETIC;DECREASED HEEL STRIKE;DECREASED TOE OFF
GAIT LEVEL OF ASSIST: STANDBY ASSIST
DISTANCE (FEET): 100
DEVIATION: ANTALGIC;DECREASED BASE OF SUPPORT;BRADYKINETIC;SHUFFLED GAIT;DECREASED HEEL STRIKE;DECREASED TOE OFF
ASSISTIVE DEVICE: 4 WHEEL WALKER
ASSISTIVE DEVICE: FRONT WHEEL WALKER;4 WHEEL WALKER

## 2023-09-07 ASSESSMENT — ACTIVITIES OF DAILY LIVING (ADL)
BED_CHAIR_WHEELCHAIR_TRANSFER_DESCRIPTION: ADAPTIVE EQUIPMENT;INCREASED TIME;SUPERVISION FOR SAFETY;VERBAL CUEING
BED_CHAIR_WHEELCHAIR_TRANSFER_DESCRIPTION: ADAPTIVE EQUIPMENT;SUPERVISION FOR SAFETY;SET-UP OF EQUIPMENT
BED_CHAIR_WHEELCHAIR_TRANSFER_DESCRIPTION: ADAPTIVE EQUIPMENT;INCREASED TIME;SET-UP OF EQUIPMENT;SUPERVISION FOR SAFETY;VERBAL CUEING
TOILET_TRANSFER_DESCRIPTION: GRAB BAR;INCREASED TIME;REQUIRES LIFT;SUPERVISION FOR SAFETY;VERBAL CUEING

## 2023-09-07 NOTE — CARE PLAN
The patient is Watcher - Medium risk of patient condition declining or worsening    Shift Goals  Clinical Goals: Bladder management, wound vac  Patient Goals: Rest      Problem: Psychosocial  Goal: Patient's level of anxiety will decrease  Outcome: Progressing    Patient is pleasant without s/s of anxiety or distress noted.      Problem: Respiratory  Goal: Patient will understand use and administration of respiratory medications to improve respiratory function  Outcome: Progressing    Patient on RA.  No signs of labored breathing noted.

## 2023-09-07 NOTE — THERAPY
Occupational Therapy  Daily Treatment     Patient Name: Barbra العراقي  Age:  78 y.o., Sex:  female  Medical Record #: [id number]  Today's Date: 9/7/2023     Precautions  Precautions: (P) Fall Risk, Weight Bearing As Tolerated Right Lower Extremity  Comments: antalgia         Subjective    Patient was resting in bed upon OT arrival to room.  She was agreeable to simple meal prep activity this session.       Objective       09/07/23 1231   OT Charge Group   OT Therapy Activity (Units) 4   OT Total Time Spent   OT Individual Total Time Spent (Mins) 60   Precautions   Precautions Fall Risk;Weight Bearing As Tolerated Right Lower Extremity   Functional Level of Assist   Grooming Supervision;Standing  (wash hands)   Bed, Chair, Wheelchair Transfer Standby Assist   Bed Chair Wheelchair Transfer Description Adaptive equipment;Supervision for safety;Set-up of equipment  (fww)   IADL Treatments   IADL Treatments Kitchen mobility education;Meal preparation;Home management   Kitchen Mobility Education Patient gathered all needed items from cupboards and fridge using FWW with SBA/supervision.   Meal Preparation Patient scrambled an egg on the stove with SBA/supervision and min verbal cues for using the stove burner.   Home Management Patient rinsed all dishes off in sink and placed in dirty dish bin with supervision.   Interdisciplinary Plan of Care Collaboration   Patient Position at End of Therapy In Bed;Call Light within Reach;Tray Table within Reach;Phone within Reach;Bed Alarm On     Functional mobility with FWW from room <> adl kitchen with SBA/supervision.    Assessment    Patient demonstrated simple meal prep, clean up and kitchen mobility with FWW and SBA/supervision.  She is demonstrating much improved walking/standing endurance and decreased complaints of pain.  She is on track for d/c home Monday.  Strengths: Able to follow instructions, Alert and oriented, Effective communication skills, Good insight into deficits/needs,  Independent prior level of function, Motivated for self care and independence, Pleasant and cooperative, Supportive family, Willingly participates in therapeutic activities  Barriers: Decreased endurance, Constipation, Generalized weakness, Impaired balance, Limited mobility, Pain    Plan        Occupational Therapy Goals (Active)       Problem: Dressing       Dates: Start:  09/05/23         Goal: STG-Within one week, patient will dress LB with supervision using AE       Dates: Start:  09/05/23               Problem: Functional Transfers       Dates: Start:  09/05/23         Goal: STG-Within one week, patient will transfer to toilet with SBA       Dates: Start:  09/05/23               Problem: OT Long Term Goals       Dates: Start:  08/31/23         Goal: LTG-By discharge, patient will complete basic self care tasks with mod I       Dates: Start:  08/31/23    Expected End:  09/22/23            Goal: LTG-By discharge, patient will perform bathroom transfers with mod I       Dates: Start:  08/31/23    Expected End:  09/22/23            Goal: LTG-By discharge, patient will complete basic home management with supervision/mod I       Dates: Start:  08/31/23    Expected End:  09/22/23               Problem: Toileting       Dates: Start:  09/05/23         Goal: STG-Within one week, patient will complete toileting tasks with supervision       Dates: Start:  09/05/23

## 2023-09-07 NOTE — CARE PLAN
The patient is Watcher - Medium risk of patient condition declining or worsening    Shift Goals  Clinical Goals: bladder mgmt, sleep  Patient Goals: sleep    Progress made toward(s) clinical / shift goals:    Problem: Knowledge Deficit - Standard  Goal: Patient and family/care givers will demonstrate understanding of plan of care, disease process/condition, diagnostic tests and medications  Outcome: Progressing     Problem: Hemodynamics  Goal: Patient's hemodynamics, fluid balance and neurologic status will be stable or improve  Outcome: Progressing     Problem: Psychosocial  Goal: Patient's level of anxiety will decrease  Outcome: Progressing     Voiding with retention less than 400 ml. Tramadol 50 mg PO given at bedtime for C/O right hip pain with relief. Patient sleeping well throughout the night.

## 2023-09-07 NOTE — PROGRESS NOTES
NURSING DAILY NOTE    Name: Barbra العراقي   Date of Admission: 8/30/2023   Admitting Diagnosis: Closed right hip fracture, initial encounter ([facility])  Attending Physician: Sara Diaz M.d.  Allergies: Ampicillin and Keflex [cephalexin]    Safety  Patient Assist  Mod assist  Patient Precautions  Fall Risk, Weight Bearing As Tolerated Right Lower Extremity  Precaution Comments  antalgia  Bed Transfer Status  Standby Assist  Toilet Transfer Status   Minimal Assist  Assistive Devices  Gait Belt, Rails, Wheelchair  Oxygen  None - Room Air  Diet/Therapeutic Dining  Current Diet Order   Procedures    Diet Order Diet: Regular     Pill Administration  whole  Agitated Behavioral Scale     ABS Level of Severity       Fall Risk  Has the patient had a fall this admission?   No  Amie Wright Fall Risk Scoring  16, HIGH RISK  Fall Risk Safety Measures  bed alarm, chair alarm, and poor balance    Vitals  Temperature: 37.1 °C (98.8 °F)  Temp src: Oral  Pulse: 80  Respiration: 16  Blood Pressure : (!) 153/67  Blood Pressure MAP (Calculated): 96 MM HG  BP Location: Right, Upper Arm  Patient BP Position: Supine     Oxygen  Pulse Oximetry: 100 %  O2 (LPM): 0  O2 Delivery Device: None - Room Air    Bowel and Bladder  Last Bowel Movement  09/06/23  Stool Type  Type 4: Like a sausage or snake, smooth and soft  Bowel Device  Bathroom  Continent  Bladder: Did not void (torres in place)   Bowel: Continent movement  Bladder Function  Urine Void (mL):  (small vd)  Number of Times Voided: 1  Urine Color: Yellow  Urine Clarity: Clear  Number of Times Incontinent of Urine: 1  Straight Catheter: 400 ml  Genitourinary Assessment   Bladder Assessment (WDL):  WDL Except  Torres Catheter: Present with Active Order  Torres Reasons per MD Order: Acute urinary retention or bladder outlet obstruction  Torres Care: Given with Soap and Water  Urinary Elimination: Catheter (Document on LDA)  Urine  Color: Yellow  Urine Clarity: Clear  Number of Bladder Accidents: 0  Total Number of Bladder of Accidents in Last 7 Days: 0  Number of Times Incontinent of Urine: 1  Bladder Device: Bathroom  Time Void: Yes  Bladder Scan: Unable To Void  $ Bladder Scan Results (mL): 298  Bladder Medications: Other (Comments)    Skin  Escobar Score   16  Sensory Interventions   Bed Types: Standard/Trauma Mattress  Skin Preventative Measures: Pillows in Use for Support / Positioning  Moisture Interventions  Moisturizers/Barriers: Barrier Paste      Pain  Pain Rating Scale  8 - Awful, hard to do anything  Pain Location  Hip  Pain Location Orientation  Right  Pain Interventions   Nurse Notified, Ambulation / Increased Activity    ADLs    Bathing   Patient Refused Bathing (pt refused shower)  Linen Change   Partial  Personal Hygiene  Change Yaritza Pads, Moist Yaritza Wipes, Perineal Care  Chlorhexidine Bath      Oral Care  Brushed Teeth  Teeth/Dentures  Missing Teeth (Comments)  Shave     Nutrition Percentage Eaten  *  * Meal *  *, Breakfast, Between % Consumed  Environmental Precautions  Treaded Slipper Socks on Patient, Personal Belongings, Wastebasket, Call Bell etc. in Easy Reach, Transferred to Stronger Side, Bed in Low Position  Patient Turns/Positioning  Patient Turns Self from Side to Side  Patient Turns Assistance/Tolerance  Assistance of One  Bed Positions  Bed Locked, Bed Controls On  Head of Bed Elevated  Self regulated      Psychosocial/Neurologic Assessment  Psychosocial Assessment  Psychosocial (WDL):  Within Defined Limits  Patient Behaviors: Anxious  Neurologic Assessment  Neuro (WDL): Exceptions to WDL  Level of Consciousness: Alert  Orientation Level: Oriented X4  Cognition: Follows commands, Appropriate attention/concentration  Speech: Clear  Motor Function/Sensation Assessment: Motor response  R Foot Dorsiflexion: Moderate  L Foot Dorsiflexion: Weak  Muscle Strength Right Arm: Good Strength Against Gravity and  Moderate Resistance  Muscle Strength Left Arm: Good Strength Against Gravity and Moderate Resistance  Muscle Strength Right Leg: Fair Strength against Gravity but No Resistance  Muscle Strength Left Leg: Fair Strength against Gravity but No Resistance  Neuro Additional Assessments: David Coma Scale  EENT (WDL):  WDL Except    Cardio/Pulmonary Assessment  Edema      Respiratory Breath Sounds  RUL Breath Sounds: Clear  RML Breath Sounds: Diminished  RLL Breath Sounds: Diminished  AZ Breath Sounds: Clear  LLL Breath Sounds: Diminished  Cardiac Assessment   Cardiac (WDL):  Within Defined Limits

## 2023-09-07 NOTE — PROGRESS NOTES
NURSING DAILY NOTE    Name: Barbra العراقي   Date of Admission: 8/30/2023   Admitting Diagnosis: Closed right hip fracture, initial encounter ([facility])  Attending Physician: Sara Diaz M.d.  Allergies: Ampicillin and Keflex [cephalexin]    Safety  Patient Assist  min mod  Patient Precautions  Fall Risk, Weight Bearing As Tolerated Right Lower Extremity  Precaution Comments  antalgia  Bed Transfer Status  Standby Assist  Toilet Transfer Status   Minimal Assist  Assistive Devices  Wheelchair, Hand held assist  Oxygen  None - Room Air  Diet/Therapeutic Dining  Current Diet Order   Procedures    Diet Order Diet: Regular     Pill Administration  whole  Agitated Behavioral Scale     ABS Level of Severity       Fall Risk  Has the patient had a fall this admission?   No  Amie Wright Fall Risk Scoring  16, HIGH RISK  Fall Risk Safety Measures  bed alarm, chair alarm, and poor balance    Vitals  Temperature: 36.9 °C (98.4 °F)  Temp src: Oral  Pulse: 82  Respiration: 18  Blood Pressure : 138/56  Blood Pressure MAP (Calculated): 83 MM HG  BP Location: Right, Upper Arm  Patient BP Position: Supine     Oxygen  Pulse Oximetry: 96 %  O2 (LPM): 0  O2 Delivery Device: None - Room Air    Bowel and Bladder  Last Bowel Movement  09/06/23  Stool Type  Type 4: Like a sausage or snake, smooth and soft  Bowel Device  Bathroom  Continent  Bladder: Did not void (torres in place)   Bowel: Continent movement  Bladder Function  Urine Void (mL):  (moderate vd)  Number of Times Voided: 1  Urine Color: Steph  Urine Clarity: Clear  Number of Times Incontinent of Urine: 1  Straight Catheter: 400 ml  Genitourinary Assessment   Bladder Assessment (WDL):  WDL Except  Torres Catheter: Present with Active Order  Torres Reasons per MD Order: Acute urinary retention or bladder outlet obstruction  Torres Care: Given with Soap and Water  Urinary Elimination: Catheter (Document on LDA)  Urine Color:  Steph  Urine Clarity: Clear  Number of Bladder Accidents: 0  Total Number of Bladder of Accidents in Last 7 Days: 0  Number of Times Incontinent of Urine: 1  Bladder Device: Bathroom  Time Void: Yes  Bladder Scan: Post Void  $ Bladder Scan Results (mL): 336  Bladder Medications: Other (Comments)    Skin  Escobar Score   16  Sensory Interventions   Bed Types: Standard/Trauma Mattress with Overlay  Skin Preventative Measures: Pillows in Use for Support / Positioning  Moisture Interventions  Moisturizers/Barriers: Barrier Paste      Pain  Pain Rating Scale  0 - No Pain  Pain Location  Hip  Pain Location Orientation  Right  Pain Interventions   Nurse Notified, Ambulation / Increased Activity    ADLs    Bathing   Patient Refused Bathing (pt refused shower)  Linen Change   Partial  Personal Hygiene  Change Yaritza Pads, Moist Yaritza Wipes, Perineal Care  Chlorhexidine Bath      Oral Care  Brushed Teeth  Teeth/Dentures  Missing Teeth (Comments)  Shave     Nutrition Percentage Eaten  *  * Meal *  *, Breakfast, Between % Consumed  Environmental Precautions  Treaded Slipper Socks on Patient, Personal Belongings, Wastebasket, Call Bell etc. in Easy Reach, Transferred to Stronger Side, Bed in Low Position  Patient Turns/Positioning  Patient Turns Self from Side to Side  Patient Turns Assistance/Tolerance  Assistance of One  Bed Positions  Bed Locked, Bed Controls On  Head of Bed Elevated  Self regulated      Psychosocial/Neurologic Assessment  Psychosocial Assessment  Psychosocial (WDL):  Within Defined Limits  Patient Behaviors: Anxious  Neurologic Assessment  Neuro (WDL): Exceptions to WDL  Level of Consciousness: Alert  Orientation Level: Oriented X4  Cognition: Follows commands, Appropriate attention/concentration  Speech: Clear  Motor Function/Sensation Assessment: Motor response  R Foot Dorsiflexion: Moderate  L Foot Dorsiflexion: Weak  Muscle Strength Right Arm: Good Strength Against Gravity and Moderate  Resistance  Muscle Strength Left Arm: Good Strength Against Gravity and Moderate Resistance  Muscle Strength Right Leg: Fair Strength against Gravity but No Resistance  Muscle Strength Left Leg: Fair Strength against Gravity but No Resistance  Neuro Additional Assessments: David Coma Scale  EENT (WDL):  WDL Except    Cardio/Pulmonary Assessment  Edema      Respiratory Breath Sounds  RUL Breath Sounds: Clear  RML Breath Sounds: Diminished  RLL Breath Sounds: Diminished  AZ Breath Sounds: Clear  LLL Breath Sounds: Diminished  Cardiac Assessment   Cardiac (WDL):  Within Defined Limits

## 2023-09-07 NOTE — PROGRESS NOTES
Open wound on buttocks noted. Cleansed with approved wound cleanser and applied barrier paste. Charge RN notified, endorsed to Day shift, Wound consult placed.

## 2023-09-07 NOTE — PROGRESS NOTES
"  Physical Medicine & Rehabilitation Progress Note    Encounter Date: 9/7/2023    Chief Complaint: Decreased mobility, weakness    Interval Events (Subjective):  Patient sitting up in room. She reports therapy is going well. She reports nausea with oxycodone but tramadol insufficient. Discussed trial of hydrocodone. She is in agreement. She has questions for CM as she thinks she needs a SW at discharge.     _____________________________________  Interdisciplinary Team Conference   Most recent IDT on 9/5/2023    Discharge Date/Disposition:  8/11/23  _____________________________________    Objective:  VITAL SIGNS: /60   Pulse 72   Temp 37.1 °C (98.8 °F) (Oral)   Resp 16   Ht 1.651 m (5' 5\")   Wt 61.7 kg (136 lb)   SpO2 96%   BMI 22.63 kg/m²   Gen: NAD  Psych: Mood and affect appropriate  CV: RRR, 1+ RLE edema  Resp: CTAB, no upper airway sounds  Abd: NTND  Neuro: AOx4, following commands    Laboratory Values:  Recent Results (from the past 72 hour(s))   Basic Metabolic Panel    Collection Time: 09/07/23  5:21 AM   Result Value Ref Range    Sodium 137 135 - 145 mmol/L    Potassium 4.4 3.6 - 5.5 mmol/L    Chloride 102 96 - 112 mmol/L    Co2 27 20 - 33 mmol/L    Glucose 102 (H) 65 - 99 mg/dL    Bun 20 8 - 22 mg/dL    Creatinine 0.53 0.50 - 1.40 mg/dL    Calcium 8.4 (L) 8.5 - 10.5 mg/dL    Anion Gap 8.0 7.0 - 16.0   MAGNESIUM    Collection Time: 09/07/23  5:21 AM   Result Value Ref Range    Magnesium 1.8 1.5 - 2.5 mg/dL   CBC WITHOUT DIFFERENTIAL    Collection Time: 09/07/23  5:21 AM   Result Value Ref Range    WBC 9.0 4.8 - 10.8 K/uL    RBC 2.97 (L) 4.20 - 5.40 M/uL    Hemoglobin 8.8 (L) 12.0 - 16.0 g/dL    Hematocrit 28.0 (L) 37.0 - 47.0 %    MCV 94.3 81.4 - 97.8 fL    MCH 29.6 27.0 - 33.0 pg    MCHC 31.4 (L) 32.2 - 35.5 g/dL    RDW 45.7 35.9 - 50.0 fL    Platelet Count 338 164 - 446 K/uL    MPV 9.6 9.0 - 12.9 fL   RETICULOCYTES COUNT    Collection Time: 09/07/23  5:21 AM   Result Value Ref Range    " Reticulocyte Count 4.1 (H) 0.8 - 2.6 %    Retic, Absolute 0.12 0.04 - 0.12 M/uL    Imm. Reticulocyte Fraction 21.1 (H) 2.6 - 16.1 %    Retic Hgb Equivalent 31.9 29.0 - 35.0 pg/cell   IRON/TOTAL IRON BIND    Collection Time: 09/07/23  5:21 AM   Result Value Ref Range    Iron 33 (L) 40 - 170 ug/dL    Total Iron Binding 187 (L) 250 - 450 ug/dL    Unsat Iron Binding 154 110 - 370 ug/dL    % Saturation 18 15 - 55 %   ESTIMATED GFR    Collection Time: 09/07/23  5:21 AM   Result Value Ref Range    GFR (CKD-EPI) 93 >60 mL/min/1.73 m 2       Medications:  Scheduled Medications   Medication Dose Frequency    [START ON 9/8/2023] ferrous sulfate  325 mg QDAY with Breakfast    amphetamine-dextroamphetamine ER  30 mg QAM    enoxaparin (LOVENOX) injection  40 mg DAILY AT 1800    Pharmacy Consult Request  1 Each PHARMACY TO DOSE    omeprazole  20 mg DAILY    ammonium lactate   BID    senna-docusate  2 Tablet BID    And    polyethylene glycol/lytes  1 Packet DAILY    melatonin  4.5 mg QHS     PRN medications: HYDROcodone-acetaminophen, traMADol, traMADol, Respiratory Therapy Consult, hydrALAZINE, carboxymethylcellulose, benzocaine-menthol, mag hydrox-al hydrox-simeth, ondansetron **OR** ondansetron, traZODone, sodium chloride, acetaminophen, senna-docusate **AND** polyethylene glycol/lytes **AND** magnesium hydroxide **AND** bisacodyl    Diet:  Current Diet Order   Procedures    Diet Order Diet: Regular       Medical Decision Making and Plan:  Right Hip Per-Prosthetic Fracture s/p ORIF Dr. Galvan 8/29/23  PT and OT for mobility and ADLs. Per guidelines, 15 hours per week between PT, OT and/or SLP.  Follow-up Ortho  WBAT RLE  [place] on until outpatient follow up  Sutures out 10-14 days post-op     Pain  PRN Tylenol + Oxycodone. Switch to Hydrocodone/Tylenol on 9/7     ABLA  Hgb drop 9.3-->8.8   Repeat 9.4, will check Fe panel- Fe low. Start Fe supplement.      Depression  No anti-depressant at home     Hyperglycemia  Order Hgb  A1c - 5.8  Monitor     Hypotension  Abdominal binder      ADHD   Add home Adderall XR 30mg daily when patient dtr brings.   9/1 Schedule Ritalin 10mg daily - qAM  9/3 Stop Ritalin, add patient's home Adderall. Continue Adderall XR     Skin  Patient at risk for skin breakdown due to debility in areas including sacrum, achilles, elbows and head in addition to other sites. Nursing to assess skin daily.      GI Ppx  Patient on Prilosec for GERD prophylaxis.      Sleep  Melatonin     Bowel   Patient on Senna-docusate for constipation prophylaxis.      Bladder  Proteus Mirabilis UTI on admission  IDFC - remove if no contraindication  Start Ciprofloxacin 8/31, continue 9/1 x 5 days, reviewed sensitivities and Cipro ok  9/3 requiring CIC. Completed Ciprofloxacin     DVT PROPHYLAXIS: Lovenox 30mg SQ q12hrs on admission --> 40mg SQ daily. Continue Lovenox  Switch to ASA 80mg nusrat on D/c per Ortho     HOSPITALIST FOLLOWING: No     CODE STATUS: FULL CODE     DISPO: Lives alone in 1 story apartment on second floor. Elevator present. Daughter can help intermittently.   ____________________________________    T. Don Diaz MD/PhD  [facility] - Physical Medicine & Rehabilitation   [facility]   ____________________________________

## 2023-09-07 NOTE — CARE PLAN
The patient is Watcher - Medium risk of patient condition declining or worsening    Shift Goals  Clinical Goals: bladder mgmt, wound vac, sleep  Patient Goals: sleep      Problem: Psychosocial  Goal: Patient's level of anxiety will decrease  Outcome: Progressing    Patient has been calm with no s/s of anxiety or distress noted today.     Problem: Risk for Aspiration  Goal: Patient's risk for aspiration will be absent or decrease  Outcome: Progressing     Patient able to swallow pills whole with thin liquid without difficulty.  No s/s of aspiration noted.

## 2023-09-07 NOTE — THERAPY
Physical Therapy   Daily Treatment     Patient Name: Barbra العراقي  Age:  78 y.o., Sex:  female  Medical Record #: [id number]  Today's Date: 9/7/2023     Precautions  Precautions: Fall Risk, Weight Bearing As Tolerated Right Lower Extremity  Comments: antalgia    Subjective    Restring in bed upon arrival  Pt reports a recent onset of posterior c/s discomfort described as soreness, she attributes this pain to the hospital bed and position when she is sleeping, because of this the patient says it is hard to stay up in her wc for very long       Objective       09/07/23 0831   PT Charge Group   PT Gait Training (Units) 1   PT Therapeutic Exercise (Units) 2   PT Therapeutic Activities (Units) 1   Supervising Physical Therapist Paige Dickey   PT Total Time Spent   PT Individual Total Time Spent (Mins) 60   Pain   Intervention Cold Pack;Emotional Support;Ambulation / Increased Activity   Gait Functional Level of Assist    Gait Level Of Assist Standby Assist   Assistive Device Front Wheel Walker;4 Wheel Walker   Distance (Feet) 100  (100' x 1 FWW SBA, 45' x 1 4WW CGA progressing to close SBA)   # of Times Distance was Traveled 2   Deviation Antalgic;Step To;Decreased Base Of Support;Bradykinetic;Decreased Heel Strike;Decreased Toe Off  (B hip IR)   Transfer Functional Level of Assist   Bed, Chair, Wheelchair Transfer Standby Assist   Bed Chair Wheelchair Transfer Description Adaptive equipment;Increased time;Set-up of equipment;Supervision for safety;Verbal cueing   Toilet Transfers Minimal Assist   Toilet Transfer Description Grab bar;Increased time;Requires lift;Supervision for safety;Verbal cueing  (from walker level)   Supine Lower Body Exercise   Heel Slide 2 sets of 10;Right  (with leg )   Sitting Lower Body Exercises   Sit to Stand 1 set of 10   Nustep Resistance Level 3;Resistance Level 1;Time (See Comments)  (5 minutes with rest break after 2 minutes, encouragment for inc ROM and SPM)   Neuro-Muscular  "Treatments   Neuro-Muscular Treatments Anterior weight shift;Weight Shift Right;Tactile Cuing;Verbal Cuing;Postural Facilitation   Comments STS from mat with HHA biasing R LE, use of 1\" step under L foot to promote increased wbing R LE, used ball btwn knees to prevent excessive hip IR   Interdisciplinary Plan of Care Collaboration   Patient Position at End of Therapy Seated;Self Releasing Lap Belt Applied;Call Light within Reach;Tray Table within Reach          09/07/23 1001   PT Charge Group   PT Gait Training (Units) 2   Supervising Physical Therapist Paige Dickey   PT Total Time Spent   PT Individual Total Time Spent (Mins) 30   Pain   Intervention Ambulation / Increased Activity   Gait Functional Level of Assist    Gait Level Of Assist Standby Assist   Assistive Device 4 Wheel Walker   Distance (Feet)   (75', 200' 4WW SBA)   # of Times Distance was Traveled 2   Deviation Antalgic;Decreased Base Of Support;Bradykinetic;Shuffled Gait;Decreased Heel Strike;Decreased Toe Off  (B hip IR, vc for upright posture)   Wheelchair Functional Level of Assist   Wheelchair Assist Supervised   Distance Wheelchair (Feet or Distance) 100   Wheelchair Description Extra time;Limited by fatigue;Supervision for safety   Transfer Functional Level of Assist   Bed, Chair, Wheelchair Transfer Standby Assist   Bed Chair Wheelchair Transfer Description Adaptive equipment;Increased time;Supervision for safety;Verbal cueing   Bed Mobility    Supine to Sit Standby Assist   Sit to Supine Minimal Assist  (fatigued, pt requested assistance vs using leg )   Neuro-Muscular Treatments   Neuro-Muscular Treatments Sequencing   Comments ed pt on bed mobility from [facility], sitting up into long sit then moving legs over EOB. instructions provided on sequencing with 4WW breaks and STS transitions   Interdisciplinary Plan of Care Collaboration   IDT Collaboration with  Occupational Therapist   Patient Position at End of Therapy In Bed;Call " "Light within Reach;Tray Table within Reach;Bed Alarm On   Collaboration Comments CLOF           Assessment    The patient was introduced to the 4WW this session for mobility, she is able to amb with close SBA on indoor level surfaces, cont to demo antalgic, discontinuous gait pattern however is progressing with both gait distance and R LE weight acceptance. The patient has made significant progress toward STG this week, she has tendency to not give herself enough credit for her participation and responded well to my encouragement   Strengths: Able to follow instructions, Effective communication skills, Good insight into deficits/needs, Independent prior level of function, Motivated for self care and independence, Pleasant and cooperative, Supportive family, Willingly participates in therapeutic activities  Barriers: Decreased endurance, Generalized weakness, Impaired activity tolerance, Impaired balance, Limited mobility, Pain, Pain poorly managed    Plan    Cont to promote self efficacy with mobility, cont to progress bed mobility with use of leg , STS/transfer training, ambulation, LE strengthening(hip abductors) improved R LE wbing and gait mechanics, 4\" platform step in //, assess consecutive stairs, progressive gait training with 4WW, outdoor amb, reinforce HEP     Passport items to be completed:  Get in/out of bed safely, in/out of a vehicle, safely use mobility device, walk or wheel around home/community, navigate up and down stairs, show how to get up/down from the ground, ensure home is accessible, demonstrate HEP, complete caregiver training      Physical Therapy Problems (Active)       Problem: Balance       Dates: Start:  08/31/23         Goal: STG-Within one week, patient will maintain static standing 3min with unilateral UE support SPV       Dates: Start:  08/31/23    Expected End:  09/22/23         Goal Note filed on 09/05/23 1235 by JUANCARLOS RuelasT       Need further assessment             "      Problem: Mobility       Dates: Start:  08/31/23         Goal: STG-Within one week, patient will propel wheelchair community 400ft mod I indoors       Dates: Start:  08/31/23    Expected End:  09/22/23         Goal Note filed on 09/05/23 1235 by Madelyn Hairston DPT       Dec activity tolerance               Goal: STG-Within one week, patient will ambulate household distance 25ft x 4 with FWW SBA       Dates: Start:  08/31/23    Expected End:  09/22/23         Goal Note filed on 09/05/23 1235 by Madelyn Hairston DPT       60 ft  CGA, bradykinetic FWW, partially met              Goal: STG-Within one week, patient will ambulate up/down a curb with FWW mod assist       Dates: Start:  08/31/23    Expected End:  09/22/23         Goal Note filed on 09/05/23 1235 by Madelyn Hairston DPT       NT, TBD, safety concerns                 Problem: Mobility Transfers       Dates: Start:  08/31/23         Goal: STG-Within one week, patient will transfer bed to chair SBA SPT (5 out of 5 trials)       Dates: Start:  08/31/23    Expected End:  09/22/23         Goal Note filed on 09/05/23 1235 by Madelyn Hairston DPT       CGA, safety                  Problem: PT-Long Term Goals       Dates: Start:  08/31/23         Goal: LTG-By discharge, patient will tolerate standing 5min at sink to complete ADLs mod I       Dates: Start:  08/31/23    Expected End:  09/22/23            Goal: LTG-By discharge, patient will ambulate 150ft x 4 with FWW mod I        Dates: Start:  08/31/23    Expected End:  09/22/23            Goal: LTG-By discharge, patient will transfer one surface to another mod I SPT safely and consistently       Dates: Start:  08/31/23    Expected End:  09/22/23            Goal: LTG-By discharge, patient will perform home exercise program seated/standing for LE strengthening to be done 3x/day in safe, independent home environment       Dates: Start:  08/31/23    Expected End:  09/22/23            Goal: LTG-By discharge, patient will  up/down curb with FWW CGA       Dates: Start:  08/31/23    Expected End:  09/22/23

## 2023-09-08 ENCOUNTER — APPOINTMENT (OUTPATIENT)
Dept: PHYSICAL THERAPY | Facility: REHABILITATION | Age: 78
DRG: 560 | End: 2023-09-08
Attending: PHYSICAL MEDICINE & REHABILITATION
Payer: MEDICARE

## 2023-09-08 ENCOUNTER — APPOINTMENT (OUTPATIENT)
Dept: OCCUPATIONAL THERAPY | Facility: REHABILITATION | Age: 78
DRG: 560 | End: 2023-09-08
Attending: PHYSICAL MEDICINE & REHABILITATION
Payer: MEDICARE

## 2023-09-08 PROCEDURE — 97530 THERAPEUTIC ACTIVITIES: CPT

## 2023-09-08 PROCEDURE — 770010 HCHG ROOM/CARE - REHAB SEMI PRIVAT*

## 2023-09-08 PROCEDURE — A9270 NON-COVERED ITEM OR SERVICE: HCPCS | Performed by: PHYSICAL MEDICINE & REHABILITATION

## 2023-09-08 PROCEDURE — 99232 SBSQ HOSP IP/OBS MODERATE 35: CPT | Performed by: PHYSICAL MEDICINE & REHABILITATION

## 2023-09-08 PROCEDURE — 97535 SELF CARE MNGMENT TRAINING: CPT

## 2023-09-08 PROCEDURE — 700102 HCHG RX REV CODE 250 W/ 637 OVERRIDE(OP): Performed by: PHYSICAL MEDICINE & REHABILITATION

## 2023-09-08 PROCEDURE — 97110 THERAPEUTIC EXERCISES: CPT

## 2023-09-08 PROCEDURE — 97602 WOUND(S) CARE NON-SELECTIVE: CPT

## 2023-09-08 PROCEDURE — 97116 GAIT TRAINING THERAPY: CPT | Mod: CQ

## 2023-09-08 PROCEDURE — 700111 HCHG RX REV CODE 636 W/ 250 OVERRIDE (IP): Mod: JZ | Performed by: PHYSICAL MEDICINE & REHABILITATION

## 2023-09-08 PROCEDURE — 97110 THERAPEUTIC EXERCISES: CPT | Mod: CQ

## 2023-09-08 PROCEDURE — 97530 THERAPEUTIC ACTIVITIES: CPT | Mod: CQ

## 2023-09-08 RX ADMIN — HYDROCODONE BITARTRATE AND ACETAMINOPHEN 1 TABLET: 5; 325 TABLET ORAL at 22:10

## 2023-09-08 RX ADMIN — Medication 4.5 MG: at 22:10

## 2023-09-08 RX ADMIN — OMEPRAZOLE 20 MG: 20 CAPSULE, DELAYED RELEASE ORAL at 09:03

## 2023-09-08 RX ADMIN — Medication: at 09:04

## 2023-09-08 RX ADMIN — Medication 1 APPLICATION: at 22:08

## 2023-09-08 RX ADMIN — DEXTROAMPHETAMINE SACCHARATE, AMPHETAMINE ASPARTATE MONOHYDRATE, DEXTROAMPHETAMINE SULFATE AND AMPHETAMINE SULFATE 30 MG: 7.5; 7.5; 7.5; 7.5 CAPSULE, EXTENDED RELEASE ORAL at 09:00

## 2023-09-08 RX ADMIN — FERROUS SULFATE TAB 325 MG (65 MG ELEMENTAL FE) 325 MG: 325 (65 FE) TAB at 09:03

## 2023-09-08 RX ADMIN — ENOXAPARIN SODIUM 40 MG: 100 INJECTION SUBCUTANEOUS at 18:00

## 2023-09-08 RX ADMIN — HYDROCODONE BITARTRATE AND ACETAMINOPHEN 1 TABLET: 5; 325 TABLET ORAL at 09:28

## 2023-09-08 SDOH — ECONOMIC STABILITY: TRANSPORTATION INSECURITY
IN THE PAST 12 MONTHS, HAS LACK OF RELIABLE TRANSPORTATION KEPT YOU FROM MEDICAL APPOINTMENTS, MEETINGS, WORK OR FROM GETTING THINGS NEEDED FOR DAILY LIVING?: NO

## 2023-09-08 SDOH — ECONOMIC STABILITY: TRANSPORTATION INSECURITY
IN THE PAST 12 MONTHS, HAS THE LACK OF TRANSPORTATION KEPT YOU FROM MEDICAL APPOINTMENTS OR FROM GETTING MEDICATIONS?: NO

## 2023-09-08 ASSESSMENT — GAIT ASSESSMENTS
DEVIATION: ANTALGIC;DECREASED BASE OF SUPPORT;DECREASED HEEL STRIKE;DECREASED TOE OFF
DISTANCE (FEET): 200
ASSISTIVE DEVICE: 4 WHEEL WALKER
GAIT LEVEL OF ASSIST: STANDBY ASSIST
ASSISTIVE DEVICE: FRONT WHEEL WALKER;4 WHEEL WALKER
GAIT LEVEL OF ASSIST: STANDBY ASSIST
DISTANCE (FEET): 250
DEVIATION: ANTALGIC;DECREASED BASE OF SUPPORT;DECREASED HEEL STRIKE;DECREASED TOE OFF;BRADYKINETIC

## 2023-09-08 ASSESSMENT — PAIN DESCRIPTION - PAIN TYPE
TYPE: SURGICAL PAIN
TYPE: ACUTE PAIN;SURGICAL PAIN
TYPE: SURGICAL PAIN

## 2023-09-08 ASSESSMENT — ACTIVITIES OF DAILY LIVING (ADL)
BED_CHAIR_WHEELCHAIR_TRANSFER_DESCRIPTION: ADAPTIVE EQUIPMENT;INCREASED TIME;INITIAL PREPARATION FOR TASK;SUPERVISION FOR SAFETY
BED_CHAIR_WHEELCHAIR_TRANSFER_DESCRIPTION: VERBAL CUEING;SUPERVISION FOR SAFETY;INCREASED TIME;ADAPTIVE EQUIPMENT

## 2023-09-08 NOTE — PROGRESS NOTES
NURSING DAILY NOTE    Name: Barbra العراقي   Date of Admission: 8/30/2023   Admitting Diagnosis: Closed right hip fracture, initial encounter ([facility])  Attending Physician: Sara Diaz M.d.  Allergies: Ampicillin and Keflex [cephalexin]    Safety  Patient Assist  min mod  Patient Precautions  Fall Risk, Weight Bearing As Tolerated Right Lower Extremity  Precaution Comments  antalgia  Bed Transfer Status  Standby Assist  Toilet Transfer Status   Minimal Assist  Assistive Devices  Wheelchair  Oxygen  None - Room Air  Diet/Therapeutic Dining  Current Diet Order   Procedures    Diet Order Diet: Regular     Pill Administration  whole  Agitated Behavioral Scale     ABS Level of Severity       Fall Risk  Has the patient had a fall this admission?   No  Amie Wright Fall Risk Scoring  16, HIGH RISK  Fall Risk Safety Measures  bed alarm, chair alarm, and poor balance    Vitals  Temperature: 37.2 °C (99 °F)  Temp src: Oral  Pulse: 77  Respiration: 18  Blood Pressure : 107/46  Blood Pressure MAP (Calculated): 66 MM HG  BP Location: Left, Upper Arm  Patient BP Position: Supine     Oxygen  Pulse Oximetry: 98 %  O2 (LPM): 0  O2 Delivery Device: None - Room Air    Bowel and Bladder  Last Bowel Movement  09/07/23  Stool Type  Type 4: Like a sausage or snake, smooth and soft  Bowel Device  Bathroom  Continent  Bladder: Did not void (torres in place)   Bowel: Continent movement  Bladder Function  Urine Void (mL):  (moderate)  Number of Times Voided: 1  Urine Color: Unable To Evaluate  Urine Clarity: Clear  Number of Times Incontinent of Urine: 1  Straight Catheter: 400 ml  Genitourinary Assessment   Bladder Assessment (WDL):  WDL Except  Torres Catheter: Present with Active Order  Torres Reasons per MD Order: Acute urinary retention or bladder outlet obstruction  Torres Care: Given with Soap and Water  Urinary Elimination: Catheter (Document on LDA)  Urine Color: Unable To  Evaluate  Urine Clarity: Clear  Number of Bladder Accidents: 0  Total Number of Bladder of Accidents in Last 7 Days: 0  Number of Times Incontinent of Urine: 1  Bladder Device: Bathroom  Time Void: Yes  Bladder Scan: Post Void  $ Bladder Scan Results (mL): 519  Bladder Medications: Other (Comments)    Skin  Escobar Score   17  Sensory Interventions   Bed Types: Standard/Trauma Mattress with Overlay  Skin Preventative Measures: Pillows in Use for Support / Positioning  Moisture Interventions  Moisturizers/Barriers: Barrier Paste      Pain  Pain Rating Scale  0 - No Pain  Pain Location  Hip  Pain Location Orientation  Right  Pain Interventions   Medication (see MAR)    ADLs    Bathing   Patient Refused Bathing (pt refused shower)  Linen Change   Complete  Personal Hygiene  Change Yaritza Pads, Moist Yaritza Wipes, Perineal Care  Chlorhexidine Bath      Oral Care  Brushed Teeth (self)  Teeth/Dentures  Missing Teeth (Comments)  Shave     Nutrition Percentage Eaten  *  * Meal *  *, Dinner, Between 25-50% Consumed  Environmental Precautions  Treaded Slipper Socks on Patient, Personal Belongings, Wastebasket, Call Bell etc. in Easy Reach, Transferred to Stronger Side, Bed in Low Position  Patient Turns/Positioning  Patient Turns Self from Side to Side  Patient Turns Assistance/Tolerance  Assistance of One  Bed Positions  Bed Locked, Bed Controls On  Head of Bed Elevated  Self regulated      Psychosocial/Neurologic Assessment  Psychosocial Assessment  Psychosocial (WDL):  Within Defined Limits  Patient Behaviors: Anxious  Neurologic Assessment  Neuro (WDL): Exceptions to WDL  Level of Consciousness: Alert  Orientation Level: Oriented X4  Cognition: Follows commands, Appropriate attention/concentration  Speech: Clear  Motor Function/Sensation Assessment: Motor response  R Foot Dorsiflexion: Moderate  L Foot Dorsiflexion: Weak  Muscle Strength Right Arm: Good Strength Against Gravity and Moderate Resistance  Muscle Strength Left  Arm: Good Strength Against Gravity and Moderate Resistance  Muscle Strength Right Leg: Fair Strength against Gravity but No Resistance  Muscle Strength Left Leg: Fair Strength against Gravity but No Resistance  Neuro Additional Assessments: David Coma Scale  EENT (WDL):  WDL Except    Cardio/Pulmonary Assessment  Edema      Respiratory Breath Sounds  RUL Breath Sounds: Clear  RML Breath Sounds: Diminished  RLL Breath Sounds: Diminished  AZ Breath Sounds: Clear  LLL Breath Sounds: Diminished  Cardiac Assessment   Cardiac (WDL):  Within Defined Limits

## 2023-09-08 NOTE — PROGRESS NOTES
"  Physical Medicine & Rehabilitation Progress Note    Encounter Date: 9/8/2023    Chief Complaint: Decreased mobility, weakness    Interval Events (Subjective):  Patient sitting up in therapy gym. She reports therapy is going well. She reports she is ready for discharge on Monday. Nicolásies NVd.     _____________________________________  Interdisciplinary Team Conference   Most recent IDT on 9/5/2023    Discharge Date/Disposition:  8/11/23  _____________________________________    Objective:  VITAL SIGNS: /60   Pulse 78   Temp 37.2 °C (98.9 °F) (Oral)   Resp 16   Ht 1.651 m (5' 5\")   Wt 61.7 kg (136 lb)   SpO2 97%   BMI 22.63 kg/m²   Gen: NAD  Psych: Mood and affect appropriate  CV: RRR, 1+ RLE edema  Resp: CTAB, no upper airway sounds  Abd: NTND  Neuro: AOx4, following commands  Unchanged from 9/7/23    Laboratory Values:  Recent Results (from the past 72 hour(s))   Basic Metabolic Panel    Collection Time: 09/07/23  5:21 AM   Result Value Ref Range    Sodium 137 135 - 145 mmol/L    Potassium 4.4 3.6 - 5.5 mmol/L    Chloride 102 96 - 112 mmol/L    Co2 27 20 - 33 mmol/L    Glucose 102 (H) 65 - 99 mg/dL    Bun 20 8 - 22 mg/dL    Creatinine 0.53 0.50 - 1.40 mg/dL    Calcium 8.4 (L) 8.5 - 10.5 mg/dL    Anion Gap 8.0 7.0 - 16.0   MAGNESIUM    Collection Time: 09/07/23  5:21 AM   Result Value Ref Range    Magnesium 1.8 1.5 - 2.5 mg/dL   CBC WITHOUT DIFFERENTIAL    Collection Time: 09/07/23  5:21 AM   Result Value Ref Range    WBC 9.0 4.8 - 10.8 K/uL    RBC 2.97 (L) 4.20 - 5.40 M/uL    Hemoglobin 8.8 (L) 12.0 - 16.0 g/dL    Hematocrit 28.0 (L) 37.0 - 47.0 %    MCV 94.3 81.4 - 97.8 fL    MCH 29.6 27.0 - 33.0 pg    MCHC 31.4 (L) 32.2 - 35.5 g/dL    RDW 45.7 35.9 - 50.0 fL    Platelet Count 338 164 - 446 K/uL    MPV 9.6 9.0 - 12.9 fL   RETICULOCYTES COUNT    Collection Time: 09/07/23  5:21 AM   Result Value Ref Range    Reticulocyte Count 4.1 (H) 0.8 - 2.6 %    Retic, Absolute 0.12 0.04 - 0.12 M/uL    Imm. " Reticulocyte Fraction 21.1 (H) 2.6 - 16.1 %    Retic Hgb Equivalent 31.9 29.0 - 35.0 pg/cell   IRON/TOTAL IRON BIND    Collection Time: 09/07/23  5:21 AM   Result Value Ref Range    Iron 33 (L) 40 - 170 ug/dL    Total Iron Binding 187 (L) 250 - 450 ug/dL    Unsat Iron Binding 154 110 - 370 ug/dL    % Saturation 18 15 - 55 %   ESTIMATED GFR    Collection Time: 09/07/23  5:21 AM   Result Value Ref Range    GFR (CKD-EPI) 93 >60 mL/min/1.73 m 2       Medications:  Scheduled Medications   Medication Dose Frequency    ferrous sulfate  325 mg QDAY with Breakfast    amphetamine-dextroamphetamine ER  30 mg QAM    enoxaparin (LOVENOX) injection  40 mg DAILY AT 1800    Pharmacy Consult Request  1 Each PHARMACY TO DOSE    omeprazole  20 mg DAILY    ammonium lactate   BID    senna-docusate  2 Tablet BID    And    polyethylene glycol/lytes  1 Packet DAILY    melatonin  4.5 mg QHS     PRN medications: HYDROcodone-acetaminophen, traMADol, traMADol, Respiratory Therapy Consult, hydrALAZINE, carboxymethylcellulose, benzocaine-menthol, mag hydrox-al hydrox-simeth, ondansetron **OR** ondansetron, traZODone, sodium chloride, acetaminophen, senna-docusate **AND** polyethylene glycol/lytes **AND** magnesium hydroxide **AND** bisacodyl    Diet:  Current Diet Order   Procedures    Diet Order Diet: Regular       Medical Decision Making and Plan:  Right Hip Per-Prosthetic Fracture s/p ORIF Dr. Galvan 8/29/23  PT and OT for mobility and ADLs. Per guidelines, 15 hours per week between PT, OT and/or SLP.  Follow-up Ortho  WBAT RLE  [place] on until outpatient follow up  Sutures out 10-14 days post-op     Pain  PRN Tylenol + Oxycodone. Switch to Hydrocodone/Tylenol on 9/7     ABLA  Hgb drop 9.3-->8.8   Repeat 9.4, will check Fe panel- Fe low. Start Fe supplement. Continue Fe supplement. Repeat labs     Depression  No anti-depressant at home     Hyperglycemia  Order Hgb A1c - 5.8  Monitor     Hypotension  Abdominal binder      ADHD   Add home  Adderall XR 30mg daily when patient dtr brings.   9/1 Schedule Ritalin 10mg daily - qAM  9/3 Stop Ritalin, add patient's home Adderall. Continue Adderall XR     Skin  Patient at risk for skin breakdown due to debility in areas including sacrum, achilles, elbows and head in addition to other sites. Nursing to assess skin daily.      GI Ppx  Patient on Prilosec for GERD prophylaxis.      Sleep  Melatonin     Bowel   Patient on Senna-docusate for constipation prophylaxis.      Bladder  Proteus Mirabilis UTI on admission  IDFC - remove if no contraindication  Start Ciprofloxacin 8/31, continue 9/1 x 5 days, reviewed sensitivities and Cipro ok  9/3 requiring CIC. Completed Ciprofloxacin     DVT PROPHYLAXIS: Lovenox 30mg SQ q12hrs on admission --> 40mg SQ daily. Continue Lovenox  Switch to ASA 80mg nusrat on D/c per Ortho     HOSPITALIST FOLLOWING: No     CODE STATUS: FULL CODE     DISPO: Lives alone in 1 story apartment on second floor. Elevator present. Daughter can help intermittently.   ____________________________________    T. Don Diaz MD/PhD  [facility] - Physical Medicine & Rehabilitation   [facility] - Brain Injury Medicine   ____________________________________

## 2023-09-08 NOTE — WOUND TEAM
[facility]  Inpatient Services  Wound and Skin Care Brief Evaluation    Admission Date: 8/30/2023     Last order of IP CONSULT TO WOUND CARE was found on 9/7/2023 from Hospital Encounter on 8/30/2023     HPI, PMH, SH: Reviewed    No chief complaint on file.    Diagnosis: Closed right hip fracture, initial encounter ([facility]) [S72.001A]    Unit where seen by Wound Team: 04/01     Wound consult placed regarding coccyx. Chart and images reviewed. This discussed with bedside RN. This clinician in to assess patient. Patient pleasant and agreeable. Patient with suspected healing bacterial lesion to upper left buttock, surrounding tissue is blanching with no discoloration. Non-selectively debrided with Wound cleanser and Gauze.     No pressure injuries or advanced wound care needs identified. Wound consult completed. No further follow up unless indicated and consulted.     Wound 09/07/23 Other (comment) Coccyx;Buttocks Left (Active)   Date First Assessed/Time First Assessed: 09/07/23 0600   Hand Hygiene Completed: Yes  Primary Wound Type: (c) Other (comment)  Location: Coccyx;Buttocks  Laterality: Left      Assessments 9/8/2023  3:00 PM   Wound Image      Site Assessment Red;Pink;Dry;Scabbed   Periwound Assessment Clean;Dry;Intact   Margins Attached edges;Defined edges   Closure Open to air   Drainage Amount None   Treatments Cleansed;Nonselective debridement;Site care   Wound Cleansing Approved Wound Cleanser   Periwound Protectant Not Applicable   Dressing Status Open to Air   NEXT Weekly Photo (Inpatient Only) 09/15/23   Wound Team Following Not following   Non-staged Wound Description Full thickness   Wound Length (cm) 3 cm   Wound Width (cm) 2 cm   Wound Depth (cm) 0 cm   Wound Surface Area (cm^2) 6 cm^2   Wound Volume (cm^3) 0 cm^3   Shape oval   Wound Odor None       PREVENTATIVE INTERVENTIONS:    Q shift Escobar - performed per nursing policy  Q shift pressure point assessments - performed per nursing  policy    Patient is able to reposition herself independently.

## 2023-09-08 NOTE — THERAPY
"Physical Therapy   Daily Treatment     Patient Name: Barbra العراقي  Age:  78 y.o., Sex:  female  Medical Record #: [id number]  Today's Date: 9/8/2023     Precautions  Precautions: (P) Fall Risk, Weight Bearing As Tolerated Right Lower Extremity  Comments: (P) antalgia    Subjective    Patient in bed and agreeable to therapy.     Objective       09/08/23 1031   PT Charge Group   PT Gait Training (Units) 1   PT Therapeutic Exercise (Units) 2   PT Therapeutic Activities (Units) 1   PT Total Time Spent   PT Individual Total Time Spent (Mins) 60   Precautions   Precautions Fall Risk;Weight Bearing As Tolerated Right Lower Extremity   Comments antalgia   Pain 0 - 10 Group   Location Leg   Location Orientation Right   Gait Functional Level of Assist    Gait Level Of Assist Standby Assist  (to supervised)   Assistive Device Front Wheel Walker;4 Wheel Walker   Distance (Feet) 200  (with FWW)   # of Times Distance was Traveled 2   Deviation Antalgic;Decreased Base Of Support;Decreased Heel Strike;Decreased Toe Off  (genu valgum)   Stairs Functional Level of Assist   Level of Assist with Stairs Contact Guard Assist   # of Stairs Climbed 4  (6\" steps with B rails)   Stairs Description Extra time;Hand rails;Limited by fatigue;Supervision for safety;Verbal cueing  (step to pattern with LLE leading on ascent, RLE on descent)   Transfer Functional Level of Assist   Bed, Chair, Wheelchair Transfer Standby Assist   Bed Chair Wheelchair Transfer Description Adaptive equipment;Increased time;Initial preparation for task;Supervision for safety  (stand step transfer with FWW)   Supine Lower Body Exercise   Bridges Two Legged;2 sets of 10   Other Exercises Hooklying BLE hip ABD with pink theraband 3x10; BLE butterfly hip adductor stretch x2 minutes   Bed Mobility    Supine to Sit Supervised   Sit to Supine Minimal Assist  (due to fatigue)   Sit to Stand Standby Assist   Scooting Supervised   Rolling Supervised   Interdisciplinary Plan of Care " "Collaboration   IDT Collaboration with  Physical Therapist Assistant (PTA);Physician   Patient Position at End of Therapy In Bed;Bed Alarm On;Call Light within Reach;Tray Table within Reach;Phone within Reach   Collaboration Comments CLOF     Supine RLE 90/90 hip flexion stretch x2 minutes, RLE knee flexion stretch x2 minutes.    Stair negotiation performed, also demonstrated sidestepping technique for descent as patient with increased difficulty with LLE ankle dorsiflexion.    Assessment    Patient tolerated session well, continues with antalgic movement patterns however no LOBs noted during session. Agreeable to initiating stair negotiation.     Strengths: Able to follow instructions, Effective communication skills, Good insight into deficits/needs, Independent prior level of function, Motivated for self care and independence, Pleasant and cooperative, Supportive family, Willingly participates in therapeutic activities  Barriers: Decreased endurance, Generalized weakness, Impaired activity tolerance, Impaired balance, Limited mobility, Pain, Pain poorly managed    Plan    Cont to promote self efficacy with mobility, cont to progress bed mobility with use of leg , STS/transfer training, ambulation, LE strengthening(hip abductors) improved R LE wbing and gait mechanics, 4\" platform step in //, assess consecutive stairs, progressive gait training with 4WW, outdoor amb, reinforce HEP     Passport items to be completed:  Get in/out of bed safely, in/out of a vehicle, safely use mobility device, walk or wheel around home/community, navigate up and down stairs, show how to get up/down from the ground, ensure home is accessible, demonstrate HEP, complete caregiver training    Physical Therapy Problems (Active)       Problem: Balance       Dates: Start:  08/31/23         Goal: STG-Within one week, patient will maintain static standing 3min with unilateral UE support SPV       Dates: Start:  08/31/23    Expected End:  " 09/22/23         Goal Note filed on 09/05/23 1235 by Madleyn Hairston DPT       Need further assessment                  Problem: Mobility       Dates: Start:  08/31/23         Goal: STG-Within one week, patient will propel wheelchair community 400ft mod I indoors       Dates: Start:  08/31/23    Expected End:  09/22/23         Goal Note filed on 09/05/23 1235 by Madelyn Hairston DPT       Dec activity tolerance               Goal: STG-Within one week, patient will ambulate household distance 25ft x 4 with FWW SBA       Dates: Start:  08/31/23    Expected End:  09/22/23         Goal Note filed on 09/05/23 1235 by Madelyn Hairston DPT       60 ft  CGA, bradykinetic FWW, partially met              Goal: STG-Within one week, patient will ambulate up/down a curb with FWW mod assist       Dates: Start:  08/31/23    Expected End:  09/22/23         Goal Note filed on 09/05/23 1235 by Madelyn Hairston DPT       NT, TBD, safety concerns                 Problem: Mobility Transfers       Dates: Start:  08/31/23         Goal: STG-Within one week, patient will transfer bed to chair SBA SPT (5 out of 5 trials)       Dates: Start:  08/31/23    Expected End:  09/22/23         Goal Note filed on 09/05/23 1235 by Madelyn Hairston DPT       CGA, safety                  Problem: PT-Long Term Goals       Dates: Start:  08/31/23         Goal: LTG-By discharge, patient will tolerate standing 5min at sink to complete ADLs mod I       Dates: Start:  08/31/23    Expected End:  09/22/23            Goal: LTG-By discharge, patient will ambulate 150ft x 4 with FWW mod I        Dates: Start:  08/31/23    Expected End:  09/22/23            Goal: LTG-By discharge, patient will transfer one surface to another mod I SPT safely and consistently       Dates: Start:  08/31/23    Expected End:  09/22/23            Goal: LTG-By discharge, patient will perform home exercise program seated/standing for LE strengthening to be done 3x/day in safe, independent home  environment       Dates: Start:  08/31/23    Expected End:  09/22/23            Goal: LTG-By discharge, patient will up/down curb with FWW CGA       Dates: Start:  08/31/23    Expected End:  09/22/23

## 2023-09-08 NOTE — CARE PLAN
The patient is Watcher - Medium risk of patient condition declining or worsening    Shift Goals  Clinical Goals: Bladder management, wound vac  Patient Goals: Sleep Well    Progress made toward(s) clinical / shift goals:    Problem: Psychosocial  Goal: Patient's level of anxiety will decrease  Outcome: Progressing     Problem: Neurogenic Bladder  Goal: Patient will demonstrate ability to take care of indwelling catheter  Outcome: Progressing  Patient voiding without difficulty. Retaining urine less than 400. ICP not needed.      Problem: Skin Integrity  Goal: Patient's skin integrity will be maintained or improve  Outcome: Progressing

## 2023-09-08 NOTE — PROGRESS NOTES
NURSING DAILY NOTE    Name: Barbra العراقي   Date of Admission: 8/30/2023   Admitting Diagnosis: Closed right hip fracture, initial encounter ([facility]ly [facility])  Attending Physician: Sara Diaz M.d.  Allergies: Ampicillin and Keflex [cephalexin]    Safety  Patient Assist  min mod  Patient Precautions  Fall Risk, Weight Bearing As Tolerated Right Lower Extremity  Precaution Comments  antalgia  Bed Transfer Status  Standby Assist  Toilet Transfer Status   Minimal Assist  Assistive Devices  Wheelchair, Hand held assist  Oxygen  None - Room Air  Diet/Therapeutic Dining  Current Diet Order   Procedures    Diet Order Diet: Regular     Pill Administration  whole  Agitated Behavioral Scale     ABS Level of Severity       Fall Risk  Has the patient had a fall this admission?   No  Amie Wright Fall Risk Scoring  16, HIGH RISK  Fall Risk Safety Measures  bed alarm, chair alarm, and poor balance    Vitals  Temperature: 37 °C (98.6 °F)  Temp src: Oral  Pulse: 88  Respiration: 16  Blood Pressure : 125/61  Blood Pressure MAP (Calculated): 82 MM HG  BP Location: Right, Upper Arm  Patient BP Position: Supine     Oxygen  Pulse Oximetry: 93 %  O2 (LPM): 0  O2 Delivery Device: None - Room Air    Bowel and Bladder  Last Bowel Movement  09/07/23  Stool Type  Type 4: Like a sausage or snake, smooth and soft  Bowel Device  Bathroom  Continent  Bladder: Did not void (torres in place)   Bowel: Continent movement  Bladder Function  Urine Void (mL):  (large vd)  Number of Times Voided: 1  Urine Color: Unable To Evaluate  Urine Clarity: Clear  Number of Times Incontinent of Urine: 1  Straight Catheter: 400 ml  Genitourinary Assessment   Bladder Assessment (WDL):  WDL Except  Torres Catheter: Present with Active Order  Torres Reasons per MD Order: Acute urinary retention or bladder outlet obstruction  Torres Care: Given with Soap and Water  Urinary Elimination: Catheter (Document on LDA)  Urine  Color: Unable To Evaluate  Urine Clarity: Clear  Number of Bladder Accidents: 0  Total Number of Bladder of Accidents in Last 7 Days: 0  Number of Times Incontinent of Urine: 1  Bladder Device: Bathroom  Time Void: Yes  Bladder Scan: Post Void  $ Bladder Scan Results (mL): 315  Bladder Medications: Other (Comments)    Skin  Escobar Score   17  Sensory Interventions   Bed Types: Standard/Trauma Mattress  Skin Preventative Measures: Pillows in Use for Support / Positioning  Moisture Interventions  Moisturizers/Barriers: Barrier Paste      Pain  Pain Rating Scale  7 - Focus of attention, prevents doing daily activities  Pain Location  Hip  Pain Location Orientation  Right  Pain Interventions   Ambulation / Increased Activity    ADLs    Bathing   Patient Refused Bathing (pt refused shower)  Linen Change   Complete  Personal Hygiene  Change Yaritza Pads, Moist Yaritza Wipes, Perineal Care  Chlorhexidine Bath      Oral Care  Brushed Teeth (self)  Teeth/Dentures  Missing Teeth (Comments)  Shave     Nutrition Percentage Eaten  *  * Meal *  *, Lunch, Between 25-50% Consumed (20%)  Environmental Precautions  Treaded Slipper Socks on Patient, Personal Belongings, Wastebasket, Call Bell etc. in Easy Reach, Transferred to Stronger Side, Bed in Low Position  Patient Turns/Positioning  Patient Turns Self from Side to Side  Patient Turns Assistance/Tolerance  Assistance of One  Bed Positions  Bed Locked, Bed Controls On  Head of Bed Elevated  Self regulated      Psychosocial/Neurologic Assessment  Psychosocial Assessment  Psychosocial (WDL):  Within Defined Limits  Patient Behaviors: Anxious  Neurologic Assessment  Neuro (WDL): Exceptions to WDL  Level of Consciousness: Alert  Orientation Level: Oriented X4  Cognition: Follows commands, Appropriate attention/concentration  Speech: Clear  Motor Function/Sensation Assessment: Motor response  R Foot Dorsiflexion: Moderate  L Foot Dorsiflexion: Weak  Muscle Strength Right Arm: Good Strength  Against Gravity and Moderate Resistance  Muscle Strength Left Arm: Good Strength Against Gravity and Moderate Resistance  Muscle Strength Right Leg: Fair Strength against Gravity but No Resistance  Muscle Strength Left Leg: Fair Strength against Gravity but No Resistance  Neuro Additional Assessments: [place] Coma Scale  EENT (WDL):  WDL Except    Cardio/Pulmonary Assessment  Edema      Respiratory Breath Sounds  RUL Breath Sounds: Clear  RML Breath Sounds: Diminished  RLL Breath Sounds: Diminished  AZ Breath Sounds: Clear  LLL Breath Sounds: Diminished  Cardiac Assessment   Cardiac (WDL):  Within Defined Limits

## 2023-09-08 NOTE — DISCHARGE PLANNING
DAVID completed s portion of RTC Access nena.  Will give to daughter on patients day of DC as requested.      9/11/23: Application provided back to patient per daughter's request.

## 2023-09-08 NOTE — THERAPY
Occupational Therapy  Daily Treatment     Patient Name: Barbra العراقي  Age:  78 y.o., Sex:  female  Medical Record #: [id number]  Today's Date: 9/8/2023     Precautions  Precautions: (P) Fall Risk, Weight Bearing As Tolerated Right Lower Extremity  Comments: antalgia         Subjective    Patient was using the restroom upon OT arrival with CNA assisting.  She was agreeable to brush teeth in standing.       Objective       09/08/23 0901   OT Charge Group   OT Self Care / ADL (Units) 1   OT Therapy Activity (Units) 1   OT Therapeutic Exercise (Units) 2   OT Total Time Spent   OT Individual Total Time Spent (Mins) 60   Precautions   Precautions Fall Risk;Weight Bearing As Tolerated Right Lower Extremity   Pain 0 - 10 Group   Location Hip   Location Orientation Right   Therapist Pain Assessment Prior to Activity   Functional Level of Assist   Grooming Supervision;Standing   Grooming Description Supervision for safety;Standing at sink   Sitting Upper Body Exercises   Sitting Upper Body Exercises Yes   Chest Press 3 sets of 10;Bilateral;Weight (See Comments for lbs)   Front Arm Raise 3 sets of 10;Right ;Left;Weight (See Comments for lbs)   Side Arm Raise 3 sets of 10;Right ;Left;Weight (See Comments for lbs)   Shoulder Press 3 sets of 10;Right ;Left;Weight (See Comments for lbs)   Internal Shoulder Rotation 3 sets of 10;Right ;Left;Weight (See Comments for lbs)   External Shoulder Rotation 3 sets of 10;Right ;Left;Weight (See Comments for lbs)   Bicep Curls 3 sets of 10;Right ;Left;Weight (See Comments for lbs)   Pronation / Supination 3 sets of 10;Right ;Left;Weight (See Comments for lbs)   Wrist Flexion / Extension 3 sets of 10;Right ;Left;Weight (See Comments for lbs)   Other Exercise Bilateral arm circles, 3 x 10   Comments 3 lb weights   IADL Treatments   Home Management Simulated laundry task using FWW to transport towels and washcloths from kitchen counter to washing machine, placing in washer, removing and putting in  dryer, removing and standing to fold with supervision.   Interdisciplinary Plan of Care Collaboration   Patient Position at End of Therapy In Bed;Bed Alarm On;Call Light within Reach;Tray Table within Reach;Phone within Reach     [facility] HEP handout issued and reviewed.    Assessment    Patient tolerated all activities well with minimal pain complaints.  She is on track for d/c home Monday.    Strengths: Able to follow instructions, Alert and oriented, Effective communication skills, Good insight into deficits/needs, Independent prior level of function, Motivated for self care and independence, Pleasant and cooperative, Supportive family, Willingly participates in therapeutic activities  Barriers: Decreased endurance, Constipation, Generalized weakness, Impaired balance, Limited mobility, Pain    Plan    D/C [facility] Saturday or Sunday for Monday d/c home    Occupational Therapy Goals (Active)       Problem: Dressing       Dates: Start:  09/05/23         Goal: STG-Within one week, patient will dress LB with supervision using AE       Dates: Start:  09/05/23               Problem: Functional Transfers       Dates: Start:  09/05/23         Goal: STG-Within one week, patient will transfer to toilet with SBA       Dates: Start:  09/05/23               Problem: OT Long Term Goals       Dates: Start:  08/31/23         Goal: LTG-By discharge, patient will complete basic self care tasks with mod I       Dates: Start:  08/31/23    Expected End:  09/22/23            Goal: LTG-By discharge, patient will perform bathroom transfers with mod I       Dates: Start:  08/31/23    Expected End:  09/22/23            Goal: LTG-By discharge, patient will complete basic home management with supervision/mod I       Dates: Start:  08/31/23    Expected End:  09/22/23               Problem: Toileting       Dates: Start:  09/05/23         Goal: STG-Within one week, patient will complete toileting tasks with supervision       Dates: Start:  09/05/23

## 2023-09-08 NOTE — DISCHARGE PLANNING
Case management  Reviewed signed copy of IMM and answered all questions.    Dc date /disposition: DC home 9/11/23 with home health.

## 2023-09-08 NOTE — THERAPY
"Physical Therapy   Daily Treatment     Patient Name: Barbra العراقي  Age:  78 y.o., Sex:  female  Medical Record #: [id number]  Today's Date: 9/8/2023     Precautions  Precautions: Fall Risk, Weight Bearing As Tolerated Right Lower Extremity  Comments: antalgia    Subjective    Pt asked I therapist would help get her legs in the bed and remove her socks for her    This writer politely declined and provided pt with all the AE she needed to perform tasks on her own which she complied with     Objective       09/08/23 1401   PT Charge Group   PT Gait Training (Units) 2   PT Therapeutic Exercise (Units) 1   PT Therapeutic Activities (Units) 1   Supervising Physical Therapist Paige Dickey   PT Total Time Spent   PT Individual Total Time Spent (Mins) 60   Pain   Intervention Ambulation / Increased Activity;Distraction;Emotional Support;Cold Pack   Pain 0 - 10 Group   Location Leg   Location Orientation Right   Description Aching;Constant   Comfort Goal Comfort with Movement;Perform Activity   Therapist Pain Assessment Post Activity Pain Same as Prior to Activity   Gait Functional Level of Assist    Gait Level Of Assist Standby Assist   Assistive Device 4 Wheel Walker   Distance (Feet) 250  (plus 40' outdoors)   # of Times Distance was Traveled 2   Deviation Antalgic;Decreased Base Of Support;Decreased Heel Strike;Decreased Toe Off;Bradykinetic  (vc for L LE hip ER \"toes out\")   Transfer Functional Level of Assist   Bed, Chair, Wheelchair Transfer Supervised   Bed Chair Wheelchair Transfer Description Verbal cueing;Supervision for safety;Increased time;Adaptive equipment   Sitting Lower Body Exercises   Nustep Resistance Level 2;Time (See Comments)  (6' B LE/UE for inc ROM flexibility, limited by pain today)   Bed Mobility    Supine to Sit Supervised   Sit to Supine Supervised   Sit to Stand Standby Assist   Scooting Supervised   Rolling Supervised   Interdisciplinary Plan of Care Collaboration   IDT Collaboration with  " Physical Therapist   Patient Position at End of Therapy In Bed;Call Light within Reach;Tray Table within Reach;Bed Alarm On   Collaboration Comments POC     Scavenger hunt for 7 grocery items within gym located at various heights, requiring pt to navigate through smaller spaces, make tight turns, back up, lock/unlock breaks frequently and problem sold throughout. Pt completed with SBA with min/mod cues for safe walker management and attention to obstacles in the environment     Assessment    Pt tolerated session well however limited by fatigue. Pt will benefit from additional training with 4WW before DC to practice safety with navigating smaller spaces and safety with breaks. When pt became fatigued this afternoon she required increased amount of vc for sequencing with the 4WW.     Strengths: Able to follow instructions, Effective communication skills, Good insight into deficits/needs, Independent prior level of function, Motivated for self care and independence, Pleasant and cooperative, Supportive family, Willingly participates in therapeutic activities  Barriers: Decreased endurance, Generalized weakness, Impaired activity tolerance, Impaired balance, Limited mobility, Pain, Pain poorly managed    Plan    4WW safety  Curb with 4WW  Demo floor recovery, fall safety packet        Collect DC [facility] data in preparation for DC home 9/11, with 4WW, follow up with  PT      Passport items to be completed:  Get in/out of bed safely, in/out of a vehicle, safely use mobility device, walk or wheel around home/community, navigate up and down stairs, show how to get up/down from the ground, ensure home is accessible, demonstrate HEP, complete caregiver training    Physical Therapy Problems (Active)       Problem: Balance       Dates: Start:  08/31/23         Goal: STG-Within one week, patient will maintain static standing 3min with unilateral UE support SPV       Dates: Start:  08/31/23    Expected End:  09/22/23          Goal Note filed on 09/05/23 1235 by Madelyn Hairston DPT       Need further assessment                  Problem: Mobility       Dates: Start:  08/31/23         Goal: STG-Within one week, patient will propel wheelchair community 400ft mod I indoors       Dates: Start:  08/31/23    Expected End:  09/22/23         Goal Note filed on 09/05/23 1235 by Madelyn Hairston DPT       Dec activity tolerance               Goal: STG-Within one week, patient will ambulate household distance 25ft x 4 with FWW SBA       Dates: Start:  08/31/23    Expected End:  09/22/23         Goal Note filed on 09/05/23 1235 by Madelyn Hairston DPT       60 ft  CGA, bradykinetic FWW, partially met              Goal: STG-Within one week, patient will ambulate up/down a curb with FWW mod assist       Dates: Start:  08/31/23    Expected End:  09/22/23         Goal Note filed on 09/05/23 1235 by Madelyn Hairston DPT       NT, TBD, safety concerns                 Problem: Mobility Transfers       Dates: Start:  08/31/23         Goal: STG-Within one week, patient will transfer bed to chair SBA SPT (5 out of 5 trials)       Dates: Start:  08/31/23    Expected End:  09/22/23         Goal Note filed on 09/05/23 1235 by Madelyn Hairston DPT       CGA, safety                  Problem: PT-Long Term Goals       Dates: Start:  08/31/23         Goal: LTG-By discharge, patient will tolerate standing 5min at sink to complete ADLs mod I       Dates: Start:  08/31/23    Expected End:  09/22/23            Goal: LTG-By discharge, patient will ambulate 150ft x 4 with FWW mod I        Dates: Start:  08/31/23    Expected End:  09/22/23            Goal: LTG-By discharge, patient will transfer one surface to another mod I SPT safely and consistently       Dates: Start:  08/31/23    Expected End:  09/22/23            Goal: LTG-By discharge, patient will perform home exercise program seated/standing for LE strengthening to be done 3x/day in safe, independent home environment        Dates: Start:  08/31/23    Expected End:  09/22/23            Goal: LTG-By discharge, patient will up/down curb with FWW CGA       Dates: Start:  08/31/23    Expected End:  09/22/23

## 2023-09-09 LAB
ANION GAP SERPL CALC-SCNC: 8 MMOL/L (ref 7–16)
BASOPHILS # BLD AUTO: 0.3 % (ref 0–1.8)
BASOPHILS # BLD: 0.02 K/UL (ref 0–0.12)
BUN SERPL-MCNC: 17 MG/DL (ref 8–22)
CALCIUM SERPL-MCNC: 8.6 MG/DL (ref 8.5–10.5)
CHLORIDE SERPL-SCNC: 105 MMOL/L (ref 96–112)
CO2 SERPL-SCNC: 27 MMOL/L (ref 20–33)
CREAT SERPL-MCNC: 0.56 MG/DL (ref 0.5–1.4)
EOSINOPHIL # BLD AUTO: 0.15 K/UL (ref 0–0.51)
EOSINOPHIL NFR BLD: 2.3 % (ref 0–6.9)
ERYTHROCYTE [DISTWIDTH] IN BLOOD BY AUTOMATED COUNT: 45.4 FL (ref 35.9–50)
GFR SERPLBLD CREATININE-BSD FMLA CKD-EPI: 92 ML/MIN/1.73 M 2
GLUCOSE SERPL-MCNC: 102 MG/DL (ref 65–99)
HCT VFR BLD AUTO: 28.6 % (ref 37–47)
HGB BLD-MCNC: 9.1 G/DL (ref 12–16)
IMM GRANULOCYTES # BLD AUTO: 0.13 K/UL (ref 0–0.11)
IMM GRANULOCYTES NFR BLD AUTO: 2 % (ref 0–0.9)
LYMPHOCYTES # BLD AUTO: 1.49 K/UL (ref 1–4.8)
LYMPHOCYTES NFR BLD: 22.6 % (ref 22–41)
MAGNESIUM SERPL-MCNC: 1.9 MG/DL (ref 1.5–2.5)
MCH RBC QN AUTO: 29.4 PG (ref 27–33)
MCHC RBC AUTO-ENTMCNC: 31.8 G/DL (ref 32.2–35.5)
MCV RBC AUTO: 92.3 FL (ref 81.4–97.8)
MONOCYTES # BLD AUTO: 0.59 K/UL (ref 0–0.85)
MONOCYTES NFR BLD AUTO: 8.9 % (ref 0–13.4)
NEUTROPHILS # BLD AUTO: 4.22 K/UL (ref 1.82–7.42)
NEUTROPHILS NFR BLD: 63.9 % (ref 44–72)
NRBC # BLD AUTO: 0 K/UL
NRBC BLD-RTO: 0 /100 WBC (ref 0–0.2)
PLATELET # BLD AUTO: 392 K/UL (ref 164–446)
PMV BLD AUTO: 9.8 FL (ref 9–12.9)
POTASSIUM SERPL-SCNC: 4.2 MMOL/L (ref 3.6–5.5)
RBC # BLD AUTO: 3.1 M/UL (ref 4.2–5.4)
SODIUM SERPL-SCNC: 140 MMOL/L (ref 135–145)
WBC # BLD AUTO: 6.6 K/UL (ref 4.8–10.8)

## 2023-09-09 PROCEDURE — 85025 COMPLETE CBC W/AUTO DIFF WBC: CPT

## 2023-09-09 PROCEDURE — 83735 ASSAY OF MAGNESIUM: CPT

## 2023-09-09 PROCEDURE — 770010 HCHG ROOM/CARE - REHAB SEMI PRIVAT*

## 2023-09-09 PROCEDURE — 700102 HCHG RX REV CODE 250 W/ 637 OVERRIDE(OP): Performed by: PHYSICAL MEDICINE & REHABILITATION

## 2023-09-09 PROCEDURE — A9270 NON-COVERED ITEM OR SERVICE: HCPCS | Performed by: PHYSICAL MEDICINE & REHABILITATION

## 2023-09-09 PROCEDURE — 99232 SBSQ HOSP IP/OBS MODERATE 35: CPT | Performed by: PHYSICAL MEDICINE & REHABILITATION

## 2023-09-09 PROCEDURE — 700111 HCHG RX REV CODE 636 W/ 250 OVERRIDE (IP): Mod: JZ | Performed by: PHYSICAL MEDICINE & REHABILITATION

## 2023-09-09 PROCEDURE — 80048 BASIC METABOLIC PNL TOTAL CA: CPT

## 2023-09-09 PROCEDURE — 36415 COLL VENOUS BLD VENIPUNCTURE: CPT

## 2023-09-09 RX ADMIN — POLYETHYLENE GLYCOL 3350 1 PACKET: 17 POWDER, FOR SOLUTION ORAL at 10:12

## 2023-09-09 RX ADMIN — DEXTROAMPHETAMINE SACCHARATE, AMPHETAMINE ASPARTATE MONOHYDRATE, DEXTROAMPHETAMINE SULFATE AND AMPHETAMINE SULFATE 30 MG: 7.5; 7.5; 7.5; 7.5 CAPSULE, EXTENDED RELEASE ORAL at 09:00

## 2023-09-09 RX ADMIN — OMEPRAZOLE 20 MG: 20 CAPSULE, DELAYED RELEASE ORAL at 09:35

## 2023-09-09 RX ADMIN — Medication: at 09:41

## 2023-09-09 RX ADMIN — HYDROCODONE BITARTRATE AND ACETAMINOPHEN 1 TABLET: 5; 325 TABLET ORAL at 09:35

## 2023-09-09 RX ADMIN — FERROUS SULFATE TAB 325 MG (65 MG ELEMENTAL FE) 325 MG: 325 (65 FE) TAB at 09:35

## 2023-09-09 RX ADMIN — SENNOSIDES AND DOCUSATE SODIUM 2 TABLET: 50; 8.6 TABLET ORAL at 09:35

## 2023-09-09 RX ADMIN — Medication 4.5 MG: at 21:11

## 2023-09-09 RX ADMIN — ENOXAPARIN SODIUM 40 MG: 100 INJECTION SUBCUTANEOUS at 18:02

## 2023-09-09 RX ADMIN — HYDROCODONE BITARTRATE AND ACETAMINOPHEN 1 TABLET: 5; 325 TABLET ORAL at 21:12

## 2023-09-09 RX ADMIN — SENNOSIDES AND DOCUSATE SODIUM 2 TABLET: 50; 8.6 TABLET ORAL at 21:11

## 2023-09-09 RX ADMIN — Medication 1 DOSE: at 21:16

## 2023-09-09 ASSESSMENT — PAIN DESCRIPTION - PAIN TYPE
TYPE: ACUTE PAIN;CHRONIC PAIN
TYPE: ACUTE PAIN;SURGICAL PAIN

## 2023-09-09 NOTE — PROGRESS NOTES
NURSING DAILY NOTE    Name: Barbra العراقي   Date of Admission: 8/30/2023   Admitting Diagnosis: Closed right hip fracture, initial encounter ([facility])  Attending Physician: Sara Diaz M.d.  Allergies: Ampicillin and Keflex [cephalexin]    Safety  Patient Assist  min mod  Patient Precautions  Fall Risk, Weight Bearing As Tolerated Right Lower Extremity  Precaution Comments  antalgia  Bed Transfer Status  Supervised  Toilet Transfer Status   Minimal Assist  Assistive Devices  Wheelchair  Oxygen  None - Room Air  Diet/Therapeutic Dining  Current Diet Order   Procedures    Diet Order Diet: Regular     Pill Administration  whole  Agitated Behavioral Scale  16  ABS Level of Severity  No Agitation    Fall Risk  Has the patient had a fall this admission?   No  Amie Wright Fall Risk Scoring  15, HIGH RISK  Fall Risk Safety Measures  bed alarm, chair alarm, and poor balance    Vitals  Temperature: 37 °C (98.6 °F)  Temp src: Oral  Pulse: 89  Respiration: 18  Blood Pressure : 127/58  Blood Pressure MAP (Calculated): 81 MM HG  BP Location: Left, Upper Arm  Patient BP Position: Supine     Oxygen  Pulse Oximetry: 97 %  O2 (LPM): 0  O2 Delivery Device: None - Room Air    Bowel and Bladder  Last Bowel Movement  09/08/23  Stool Type  Type 6: Fluffy pieces with ragged edges, a mushy stool  Bowel Device  Bathroom  Continent  Bladder: Did not void (torres in place)   Bowel: Continent movement  Bladder Function  Urine Void (mL):  (moderate)  Number of Times Voided: 1  Urine Color: Unable To Evaluate  Urine Clarity: Clear  Number of Times Incontinent of Urine: 1  Straight Catheter: 400 ml  Genitourinary Assessment   Bladder Assessment (WDL):  WDL Except  Torres Catheter: Not Applicable  Torres Reasons per MD Order: Acute urinary retention or bladder outlet obstruction  Torres Care: Given with Soap and Water  Urinary Elimination: Catheter (Document on LDA)  Urine Color: Unable  To Evaluate  Urine Clarity: Clear  Number of Bladder Accidents: 0  Total Number of Bladder of Accidents in Last 7 Days: 0  Number of Times Incontinent of Urine: 1  Bladder Device: Bathroom  Time Void: Yes  Bladder Scan: Post Void  $ Bladder Scan Results (mL): 200  Bladder Medications: No    Skin  Escobar Score   17  Sensory Interventions   Bed Types: Standard/Trauma Mattress  Skin Preventative Measures: Pillows in Use for Support / Positioning  Moisture Interventions  Moisturizers/Barriers: Barrier Wipes, Barrier Paste      Pain  Pain Rating Scale  4 - Distracts me, can do usual activities  Pain Location  Leg  Pain Location Orientation  Right  Pain Interventions   Ambulation / Increased Activity, Distraction, Emotional Support, Cold Pack    ADLs    Bathing   Patient Refused Bathing  Linen Change   Complete  Personal Hygiene  Change Yaritza Pads, Moist Yaritza Wipes, Perineal Care  Chlorhexidine Bath      Oral Care  Brushed Teeth (self)  Teeth/Dentures  Missing Teeth (Comments)  Shave     Nutrition Percentage Eaten  *  * Meal *  *, Lunch, Between 50-75% Consumed  Environmental Precautions  Treaded Slipper Socks on Patient, Personal Belongings, Wastebasket, Call Bell etc. in Easy Reach, Bed in Low Position  Patient Turns/Positioning  Patient Turns Self from Side to Side  Patient Turns Assistance/Tolerance  Assistance of One  Bed Positions  Bed Locked, Bed Controls On  Head of Bed Elevated  Self regulated      Psychosocial/Neurologic Assessment  Psychosocial Assessment  Psychosocial (WDL):  Within Defined Limits  Patient Behaviors: Anxious  Neurologic Assessment  Neuro (WDL): Exceptions to WDL  Level of Consciousness: Alert  Orientation Level: Oriented X4  Cognition: Follows commands, Appropriate attention/concentration  Speech: Clear  Motor Function/Sensation Assessment: Motor response  R Foot Dorsiflexion: Moderate  L Foot Dorsiflexion: Weak  Muscle Strength Right Arm: Good Strength Against Gravity and Moderate  Resistance  Muscle Strength Left Arm: Good Strength Against Gravity and Moderate Resistance  Muscle Strength Right Leg: Fair Strength against Gravity but No Resistance  Muscle Strength Left Leg: Fair Strength against Gravity but No Resistance  Neuro Additional Assessments: David Coma Scale  EENT (WDL):  WDL Except    Cardio/Pulmonary Assessment  Edema      Respiratory Breath Sounds  RUL Breath Sounds: Clear  RML Breath Sounds: Diminished  RLL Breath Sounds: Diminished  AZ Breath Sounds: Clear  LLL Breath Sounds: Diminished  Cardiac Assessment   Cardiac (WDL):  Within Defined Limits

## 2023-09-09 NOTE — PROGRESS NOTES
NURSING DAILY NOTE    Name: Barbra العراقي   Date of Admission: 8/30/2023   Admitting Diagnosis: Closed right hip fracture, initial encounter ([facility])  Attending Physician: Sara Diaz M.d.  Allergies: Ampicillin and Keflex [cephalexin]    Safety  Patient Assist  min to mod  Patient Precautions  Fall Risk, Weight Bearing As Tolerated Right Lower Extremity  Precaution Comments  antalgia  Bed Transfer Status  Supervised  Toilet Transfer Status   Minimal Assist  Assistive Devices  Wheelchair  Oxygen  None - Room Air  Diet/Therapeutic Dining  Current Diet Order   Procedures    Diet Order Diet: Regular     Pill Administration  whole  Agitated Behavioral Scale  16  ABS Level of Severity  No Agitation    Fall Risk  Has the patient had a fall this admission?   No  Amie Wright Fall Risk Scoring  15, HIGH RISK  Fall Risk Safety Measures  bed alarm, chair alarm, seatbelt alarm, poor balance, and low vision/ hearing    Vitals  Temperature: 36.7 °C (98 °F)  Temp src: Oral  Pulse: 70  Respiration: 17  Blood Pressure : 127/64  Blood Pressure MAP (Calculated): 85 MM HG  BP Location: Right, Upper Arm  Patient BP Position: Supine     Oxygen  Pulse Oximetry: 99 %  O2 (LPM): 0  O2 Delivery Device: None - Room Air    Bowel and Bladder  Last Bowel Movement  09/08/23  Stool Type  Type 6: Fluffy pieces with ragged edges, a mushy stool  Bowel Device  Bathroom  Continent  Bladder: Did not void (torres in place)   Bowel: Continent movement  Bladder Function  Urine Void (mL):  (moderate)  Number of Times Voided: 1  Urine Color: Unable To Evaluate  Urine Clarity: Clear  Number of Times Incontinent of Urine: 1  Straight Catheter:  (nurse cath)  Genitourinary Assessment   Bladder Assessment (WDL):  WDL Except  Torres Catheter: Not Applicable  Torres Reasons per MD Order: Acute urinary retention or bladder outlet obstruction  Torres Care: Given with Soap and Water  Urinary Elimination:  Catheter (Document on LDA)  Urine Color: Unable To Evaluate  Urine Clarity: Clear  Number of Bladder Accidents: 0  Total Number of Bladder of Accidents in Last 7 Days: 0  Number of Times Incontinent of Urine: 1  Bladder Device: Bathroom  Time Void: Yes  Bladder Scan: Post Void  $ Bladder Scan Results (mL): 500  Bladder Medications: No    Skin  Escobar Score   17  Sensory Interventions   Bed Types: Standard/Trauma Mattress  Skin Preventative Measures: Pillows in Use for Support / Positioning  Moisture Interventions  Moisturizers/Barriers: Barrier Wipes      Pain  Pain Rating Scale  6 - Hard to ignore, avoid usual activities  Pain Location  Leg  Pain Location Orientation  Right  Pain Interventions   Ambulation / Increased Activity, Distraction, Emotional Support, Cold Pack    ADLs    Bathing   Patient Refused Bathing  Linen Change   Complete  Personal Hygiene  Change Yaritza Pads, Moist Yaritza Wipes, Perineal Care  Chlorhexidine Bath      Oral Care  Brushed Teeth (self)  Teeth/Dentures  Missing Teeth (Comments)  Shave     Nutrition Percentage Eaten  *  * Meal *  *, Lunch, Between 50-75% Consumed  Environmental Precautions  Treaded Slipper Socks on Patient, Personal Belongings, Wastebasket, Call Bell etc. in Easy Reach, Transferred to Stronger Side, Report Given to Other Health Care Providers Regarding Fall Risk, Bed in Low Position  Patient Turns/Positioning  Patient Turns Self from Side to Side  Patient Turns Assistance/Tolerance  Assistance of One  Bed Positions  Bed Locked, Bed Controls On  Head of Bed Elevated  Self regulated      Psychosocial/Neurologic Assessment  Psychosocial Assessment  Psychosocial (WDL):  Within Defined Limits  Patient Behaviors: Drowsy, Fatigue  Neurologic Assessment  Neuro (WDL): Exceptions to WDL  Level of Consciousness: Alert  Orientation Level: Oriented X4  Cognition: Follows commands, Appropriate attention/concentration  Speech: Clear  Motor Function/Sensation Assessment: Motor response  R  Foot Dorsiflexion: Moderate  L Foot Dorsiflexion: Weak  Muscle Strength Right Arm: Good Strength Against Gravity and Moderate Resistance  Muscle Strength Left Arm: Good Strength Against Gravity and Moderate Resistance  Muscle Strength Right Leg: Fair Strength against Gravity but No Resistance  Muscle Strength Left Leg: Fair Strength against Gravity but No Resistance  Neuro Additional Assessments: David Coma Scale  EENT (WDL):  WDL Except    Cardio/Pulmonary Assessment  Edema      Respiratory Breath Sounds  RUL Breath Sounds: Clear  RML Breath Sounds: Diminished  RLL Breath Sounds: Diminished  AZ Breath Sounds: Clear  LLL Breath Sounds: Diminished  Cardiac Assessment   Cardiac (WDL):  Within Defined Limits

## 2023-09-09 NOTE — PROGRESS NOTES
Received shift report from day RN Adryan and assumed care of patient.  Patient awake, calm and stable, currently positioned in bed for comfort and safety; call light within reach.  Denies pain or discomfort at this time.  Will continue to monitor.

## 2023-09-09 NOTE — PROGRESS NOTES
"  Physical Medicine & Rehabilitation Progress Note    Encounter Date: 9/9/2023    Chief Complaint: Decreased mobility, weakness    Interval Events (Subjective):  Patient sitting up in room. She reports therapy is going well. She reports her family has some anxiety about her going home. Per patient her daughter will be available only limited. Her son, in [place], has concerns about sisters ability to care for patient. Discussed with patient and she feels like she is doing well. Reviewed AM labs and improving anemia.     _____________________________________  Interdisciplinary Team Conference   Most recent IDT on 9/5/2023    Discharge Date/Disposition:  8/11/23  _____________________________________    Objective:  VITAL SIGNS: /64   Pulse 70   Temp 36.7 °C (98 °F) (Oral)   Resp 17   Ht 1.651 m (5' 5\")   Wt 61.7 kg (136 lb)   SpO2 99%   BMI 22.63 kg/m²   Gen: NAD  Psych: Mood and affect appropriate  CV: RRR, 0 edema  Resp: CTAB, no upper airway sounds  Abd: NTND  Neuro: AOx4, following commands    Laboratory Values:  Recent Results (from the past 72 hour(s))   Basic Metabolic Panel    Collection Time: 09/07/23  5:21 AM   Result Value Ref Range    Sodium 137 135 - 145 mmol/L    Potassium 4.4 3.6 - 5.5 mmol/L    Chloride 102 96 - 112 mmol/L    Co2 27 20 - 33 mmol/L    Glucose 102 (H) 65 - 99 mg/dL    Bun 20 8 - 22 mg/dL    Creatinine 0.53 0.50 - 1.40 mg/dL    Calcium 8.4 (L) 8.5 - 10.5 mg/dL    Anion Gap 8.0 7.0 - 16.0   MAGNESIUM    Collection Time: 09/07/23  5:21 AM   Result Value Ref Range    Magnesium 1.8 1.5 - 2.5 mg/dL   CBC WITHOUT DIFFERENTIAL    Collection Time: 09/07/23  5:21 AM   Result Value Ref Range    WBC 9.0 4.8 - 10.8 K/uL    RBC 2.97 (L) 4.20 - 5.40 M/uL    Hemoglobin 8.8 (L) 12.0 - 16.0 g/dL    Hematocrit 28.0 (L) 37.0 - 47.0 %    MCV 94.3 81.4 - 97.8 fL    MCH 29.6 27.0 - 33.0 pg    MCHC 31.4 (L) 32.2 - 35.5 g/dL    RDW 45.7 35.9 - 50.0 fL    Platelet Count 338 164 - 446 K/uL    MPV 9.6 9.0 " - 12.9 fL   RETICULOCYTES COUNT    Collection Time: 09/07/23  5:21 AM   Result Value Ref Range    Reticulocyte Count 4.1 (H) 0.8 - 2.6 %    Retic, Absolute 0.12 0.04 - 0.12 M/uL    Imm. Reticulocyte Fraction 21.1 (H) 2.6 - 16.1 %    Retic Hgb Equivalent 31.9 29.0 - 35.0 pg/cell   IRON/TOTAL IRON BIND    Collection Time: 09/07/23  5:21 AM   Result Value Ref Range    Iron 33 (L) 40 - 170 ug/dL    Total Iron Binding 187 (L) 250 - 450 ug/dL    Unsat Iron Binding 154 110 - 370 ug/dL    % Saturation 18 15 - 55 %   ESTIMATED GFR    Collection Time: 09/07/23  5:21 AM   Result Value Ref Range    GFR (CKD-EPI) 93 >60 mL/min/1.73 m 2   CBC WITH DIFFERENTIAL    Collection Time: 09/09/23  5:14 AM   Result Value Ref Range    WBC 6.6 4.8 - 10.8 K/uL    RBC 3.10 (L) 4.20 - 5.40 M/uL    Hemoglobin 9.1 (L) 12.0 - 16.0 g/dL    Hematocrit 28.6 (L) 37.0 - 47.0 %    MCV 92.3 81.4 - 97.8 fL    MCH 29.4 27.0 - 33.0 pg    MCHC 31.8 (L) 32.2 - 35.5 g/dL    RDW 45.4 35.9 - 50.0 fL    Platelet Count 392 164 - 446 K/uL    MPV 9.8 9.0 - 12.9 fL    Neutrophils-Polys 63.90 44.00 - 72.00 %    Lymphocytes 22.60 22.00 - 41.00 %    Monocytes 8.90 0.00 - 13.40 %    Eosinophils 2.30 0.00 - 6.90 %    Basophils 0.30 0.00 - 1.80 %    Immature Granulocytes 2.00 (H) 0.00 - 0.90 %    Nucleated RBC 0.00 0.00 - 0.20 /100 WBC    Neutrophils (Absolute) 4.22 1.82 - 7.42 K/uL    Lymphs (Absolute) 1.49 1.00 - 4.80 K/uL    Monos (Absolute) 0.59 0.00 - 0.85 K/uL    Eos (Absolute) 0.15 0.00 - 0.51 K/uL    Baso (Absolute) 0.02 0.00 - 0.12 K/uL    Immature Granulocytes (abs) 0.13 (H) 0.00 - 0.11 K/uL    NRBC (Absolute) 0.00 K/uL   Basic Metabolic Panel    Collection Time: 09/09/23  5:14 AM   Result Value Ref Range    Sodium 140 135 - 145 mmol/L    Potassium 4.2 3.6 - 5.5 mmol/L    Chloride 105 96 - 112 mmol/L    Co2 27 20 - 33 mmol/L    Glucose 102 (H) 65 - 99 mg/dL    Bun 17 8 - 22 mg/dL    Creatinine 0.56 0.50 - 1.40 mg/dL    Calcium 8.6 8.5 - 10.5 mg/dL    Anion Gap  8.0 7.0 - 16.0   MAGNESIUM    Collection Time: 09/09/23  5:14 AM   Result Value Ref Range    Magnesium 1.9 1.5 - 2.5 mg/dL   ESTIMATED GFR    Collection Time: 09/09/23  5:14 AM   Result Value Ref Range    GFR (CKD-EPI) 92 >60 mL/min/1.73 m 2       Medications:  Scheduled Medications   Medication Dose Frequency    ferrous sulfate  325 mg QDAY with Breakfast    amphetamine-dextroamphetamine ER  30 mg QAM    enoxaparin (LOVENOX) injection  40 mg DAILY AT 1800    Pharmacy Consult Request  1 Each PHARMACY TO DOSE    omeprazole  20 mg DAILY    ammonium lactate   BID    senna-docusate  2 Tablet BID    And    polyethylene glycol/lytes  1 Packet DAILY    melatonin  4.5 mg QHS     PRN medications: HYDROcodone-acetaminophen, traMADol, traMADol, Respiratory Therapy Consult, hydrALAZINE, carboxymethylcellulose, benzocaine-menthol, mag hydrox-al hydrox-simeth, ondansetron **OR** ondansetron, traZODone, sodium chloride, acetaminophen, senna-docusate **AND** polyethylene glycol/lytes **AND** magnesium hydroxide **AND** bisacodyl    Diet:  Current Diet Order   Procedures    Diet Order Diet: Regular       Medical Decision Making and Plan:  Right Hip Per-Prosthetic Fracture s/p ORIF Dr. Galvan 8/29/23  PT and OT for mobility and ADLs. Per guidelines, 15 hours per week between PT, OT and/or SLP.  Follow-up Ortho  WBAT RLE  [place] on until outpatient follow up  Sutures out 10-14 days post-op     Pain  PRN Tylenol + Oxycodone. Switch to Hydrocodone/Tylenol on 9/7. Continue Norco     ABLA  Hgb drop 9.3-->8.8   Repeat 9.4, will check Fe panel- Fe low. Start Fe supplement. Continue Fe supplement. Repeat labs - 9.1, improving     Depression  No anti-depressant at home     Hyperglycemia  Order Hgb A1c - 5.8  Monitor     Hypotension  Abdominal binder      ADHD   Add home Adderall XR 30mg daily when patient dtr brings.   9/1 Schedule Ritalin 10mg daily - qAM  9/3 Stop Ritalin, add patient's home Adderall. Continue Adderall XR      Skin  Patient at risk for skin breakdown due to debility in areas including sacrum, achilles, elbows and head in addition to other sites. Nursing to assess skin daily.      GI Ppx  Patient on Prilosec for GERD prophylaxis.      Sleep  Melatonin     Bowel   Patient on Senna-docusate for constipation prophylaxis.      Bladder  Proteus Mirabilis UTI on admission  IDFC - remove if no contraindication  Start Ciprofloxacin 8/31, continue 9/1 x 5 days, reviewed sensitivities and Cipro ok  9/3 requiring CIC. Completed Ciprofloxacin     DVT PROPHYLAXIS: Lovenox 30mg SQ q12hrs on admission --> 40mg SQ daily. Continue Lovenox  Switch to ASA 80mg nusrat on D/c per Ortho     HOSPITALIST FOLLOWING: No     CODE STATUS: FULL CODE     DISPO: Lives alone in 1 story apartment on second floor. Elevator present. Daughter can help intermittently.   ____________________________________    T. Don Diaz MD/PhD  [facility] - Physical Medicine & Rehabilitation   [facility] - Brain Injury Medicine   ____________________________________

## 2023-09-09 NOTE — CARE PLAN
Problem: Knowledge Deficit - Standard  Goal: Patient and family/care givers will demonstrate understanding of plan of care, disease process/condition, diagnostic tests and medications  Outcome: Progressing     Problem: Skin Integrity  Goal: Patient's skin integrity will be maintained or improve  Outcome: Progressing   The patient is Watcher - Medium risk of patient condition declining or worsening    Shift Goals  Clinical Goals: safety, bladder managment  Patient Goals: sleep well, pain control

## 2023-09-10 ENCOUNTER — APPOINTMENT (OUTPATIENT)
Dept: OCCUPATIONAL THERAPY | Facility: REHABILITATION | Age: 78
DRG: 560 | End: 2023-09-10
Attending: PHYSICAL MEDICINE & REHABILITATION
Payer: MEDICARE

## 2023-09-10 ENCOUNTER — APPOINTMENT (OUTPATIENT)
Dept: PHYSICAL THERAPY | Facility: REHABILITATION | Age: 78
DRG: 560 | End: 2023-09-10
Attending: PHYSICAL MEDICINE & REHABILITATION
Payer: MEDICARE

## 2023-09-10 PROCEDURE — A9270 NON-COVERED ITEM OR SERVICE: HCPCS | Performed by: PHYSICAL MEDICINE & REHABILITATION

## 2023-09-10 PROCEDURE — 99233 SBSQ HOSP IP/OBS HIGH 50: CPT | Performed by: PHYSICAL MEDICINE & REHABILITATION

## 2023-09-10 PROCEDURE — 700102 HCHG RX REV CODE 250 W/ 637 OVERRIDE(OP): Performed by: PHYSICAL MEDICINE & REHABILITATION

## 2023-09-10 PROCEDURE — 97535 SELF CARE MNGMENT TRAINING: CPT

## 2023-09-10 PROCEDURE — 97530 THERAPEUTIC ACTIVITIES: CPT

## 2023-09-10 PROCEDURE — 770010 HCHG ROOM/CARE - REHAB SEMI PRIVAT*

## 2023-09-10 PROCEDURE — 97116 GAIT TRAINING THERAPY: CPT

## 2023-09-10 PROCEDURE — 97110 THERAPEUTIC EXERCISES: CPT

## 2023-09-10 RX ORDER — ASPIRIN 81 MG/1
81 TABLET, CHEWABLE ORAL 2 TIMES DAILY
Status: DISCONTINUED | OUTPATIENT
Start: 2023-09-11 | End: 2023-09-11 | Stop reason: HOSPADM

## 2023-09-10 RX ORDER — FERROUS SULFATE 325(65) MG
325 TABLET ORAL
Qty: 30 TABLET | Refills: 2 | Status: SHIPPED | OUTPATIENT
Start: 2023-09-11 | End: 2023-09-11 | Stop reason: SDUPTHER

## 2023-09-10 RX ORDER — HYDROCODONE BITARTRATE AND ACETAMINOPHEN 5; 325 MG/1; MG/1
1 TABLET ORAL EVERY 4 HOURS PRN
Qty: 21 TABLET | Refills: 0 | Status: SHIPPED | OUTPATIENT
Start: 2023-09-10 | End: 2023-09-11 | Stop reason: SDUPTHER

## 2023-09-10 RX ORDER — ASPIRIN 81 MG/1
81 TABLET, CHEWABLE ORAL 2 TIMES DAILY
Qty: 100 TABLET | Refills: 0 | Status: SHIPPED | OUTPATIENT
Start: 2023-09-11 | End: 2023-09-11 | Stop reason: SDUPTHER

## 2023-09-10 RX ADMIN — Medication 4.5 MG: at 22:36

## 2023-09-10 RX ADMIN — FERROUS SULFATE TAB 325 MG (65 MG ELEMENTAL FE) 325 MG: 325 (65 FE) TAB at 08:22

## 2023-09-10 RX ADMIN — Medication: at 08:21

## 2023-09-10 RX ADMIN — SENNOSIDES AND DOCUSATE SODIUM 2 TABLET: 50; 8.6 TABLET ORAL at 08:22

## 2023-09-10 RX ADMIN — SENNOSIDES AND DOCUSATE SODIUM 2 TABLET: 50; 8.6 TABLET ORAL at 22:37

## 2023-09-10 RX ADMIN — OMEPRAZOLE 20 MG: 20 CAPSULE, DELAYED RELEASE ORAL at 08:22

## 2023-09-10 RX ADMIN — DEXTROAMPHETAMINE SACCHARATE, AMPHETAMINE ASPARTATE MONOHYDRATE, DEXTROAMPHETAMINE SULFATE AND AMPHETAMINE SULFATE 30 MG: 7.5; 7.5; 7.5; 7.5 CAPSULE, EXTENDED RELEASE ORAL at 09:00

## 2023-09-10 RX ADMIN — HYDROCODONE BITARTRATE AND ACETAMINOPHEN 1 TABLET: 5; 325 TABLET ORAL at 04:22

## 2023-09-10 RX ADMIN — HYDROCODONE BITARTRATE AND ACETAMINOPHEN 1 TABLET: 5; 325 TABLET ORAL at 20:45

## 2023-09-10 RX ADMIN — HYDROCODONE BITARTRATE AND ACETAMINOPHEN 1 TABLET: 5; 325 TABLET ORAL at 08:22

## 2023-09-10 ASSESSMENT — BRIEF INTERVIEW FOR MENTAL STATUS (BIMS)
ASKED TO RECALL SOCK: YES, NO CUE REQUIRED
BIMS SUMMARY SCORE: 15
WHAT MONTH IS IT: ACCURATE WITHIN 5 DAYS
ASKED TO RECALL BED: YES, NO CUE REQUIRED
ASKED TO RECALL BLUE: YES, NO CUE REQUIRED
INITIAL REPETITION OF BED BLUE SOCK - FIRST ATTEMPT: 3
WHAT YEAR IS IT: CORRECT
WHAT DAY OF THE WEEK IS IT: CORRECT

## 2023-09-10 ASSESSMENT — PAIN DESCRIPTION - PAIN TYPE
TYPE: CHRONIC PAIN
TYPE: SURGICAL PAIN

## 2023-09-10 ASSESSMENT — GAIT ASSESSMENTS
DEVIATION: ANTALGIC
GAIT LEVEL OF ASSIST: MODIFIED INDEPENDENT
DISTANCE (FEET): 300
ASSISTIVE DEVICE: 4 WHEEL WALKER

## 2023-09-10 ASSESSMENT — FIBROSIS 4 INDEX: FIB4 SCORE: 1.61

## 2023-09-10 ASSESSMENT — ACTIVITIES OF DAILY LIVING (ADL)
TOILETING_LEVEL_OF_ASSIST_DESCRIPTION: GRAB BAR;INCREASED TIME;SUPERVISION FOR SAFETY
TOILET_TRANSFER_DESCRIPTION: GRAB BAR;INCREASED TIME;SUPERVISION FOR SAFETY
BED_CHAIR_WHEELCHAIR_TRANSFER_DESCRIPTION: VERBAL CUEING;INCREASED TIME;SUPERVISION FOR SAFETY
TUB_SHOWER_TRANSFER_DESCRIPTION: GRAB BAR;INCREASED TIME;SUPERVISION FOR SAFETY;SET-UP OF EQUIPMENT

## 2023-09-10 NOTE — PROGRESS NOTES
NURSING DAILY NOTE    Name: Barbra العراقي   Date of Admission: 8/30/2023   Admitting Diagnosis: Closed right hip fracture, initial encounter ([facility])  Attending Physician: Sara Diaz M.d.  Allergies: Ampicillin and Keflex [cephalexin]    Safety  Patient Assist  min to mod  Patient Precautions  Fall Risk, Weight Bearing As Tolerated Right Lower Extremity  Precaution Comments  antalgia  Bed Transfer Status  Modified Independent  Toilet Transfer Status   Minimal Assist  Assistive Devices  Wheelchair  Oxygen  None - Room Air  Diet/Therapeutic Dining  Current Diet Order   Procedures    Diet Order Diet: Regular     Pill Administration  whole  Agitated Behavioral Scale  16  ABS Level of Severity  No Agitation    Fall Risk  Has the patient had a fall this admission?   No  Amie Wright Fall Risk Scoring  15, HIGH RISK  Fall Risk Safety Measures  bed alarm, chair alarm, and low vision/ hearing    Vitals  Temperature: 37 °C (98.6 °F)  Temp src: Oral  Pulse: 99  Respiration: 16  Blood Pressure : (!) 143/58  Blood Pressure MAP (Calculated): 86 MM HG  BP Location: Left, Upper Arm  Patient BP Position: Supine     Oxygen  Pulse Oximetry: 94 %  O2 (LPM): 0  O2 Delivery Device: None - Room Air    Bowel and Bladder  Last Bowel Movement  09/08/23  Stool Type  Type 6: Fluffy pieces with ragged edges, a mushy stool  Bowel Device  Bathroom  Continent  Bladder: Did not void (torres in place)   Bowel: Continent movement  Bladder Function  Urine Void (mL): 0 ml  Number of Times Voided: 1  Urine Color: Yellow  Urine Clarity: Clear  Number of Times Incontinent of Urine: 1  Straight Catheter: 300 ml  Genitourinary Assessment   Bladder Assessment (WDL):  WDL Except  Torres Catheter: Not Applicable  Torres Reasons per MD Order: Acute urinary retention or bladder outlet obstruction  Torres Care: Given with Soap and Water  Urinary Elimination: Catheter (Document on LDA)  Urine Color:  Yellow  Urine Clarity: Clear  Number of Bladder Accidents: 0  Total Number of Bladder of Accidents in Last 7 Days: 0  Number of Times Incontinent of Urine: 1  Bladder Device: Bathroom  Time Void: Yes  Bladder Scan: Unable To Void  $ Bladder Scan Results (mL): 411  Bladder Medications: No    Skin  Escobar Score   17  Sensory Interventions   Bed Types: Standard/Trauma Mattress  Skin Preventative Measures: Pillows in Use to Float Heels, Waffle Overlay  Moisture Interventions  Moisturizers/Barriers: Barrier Wipes      Pain  Pain Rating Scale  4 - Distracts me, can do usual activities  Pain Location  Hip  Pain Location Orientation  Right  Pain Interventions   Emotional Support, Environmental Changes    ADLs    Bathing   Patient Refused Bathing  Linen Change   Complete  Personal Hygiene  Change Yaritza Pads, Moist Yaritza Wipes, Perineal Care  Chlorhexidine Bath      Oral Care  Brushed Teeth (self)  Teeth/Dentures  Missing Teeth (Comments)  Shave     Nutrition Percentage Eaten  Lunch, Between 50-75% Consumed  Environmental Precautions  Treaded Slipper Socks on Patient, Personal Belongings, Wastebasket, Call Bell etc. in Easy Reach, Transferred to [facility] Side, Bed in Low Position  Patient Turns/Positioning  Patient Turns Self from Side to Side  Patient Turns Assistance/Tolerance  Assistance of One  Bed Positions  Bed Locked, Bed Controls On  Head of Bed Elevated  Self regulated      Psychosocial/Neurologic Assessment  Psychosocial Assessment  Psychosocial (WDL):  Within Defined Limits  Patient Behaviors: Drowsy, Fatigue  Neurologic Assessment  Neuro (WDL): Exceptions to WDL  Level of Consciousness: Alert  Orientation Level: Oriented X4  Cognition: Follows commands, Appropriate attention/concentration  Speech: Clear  Motor Function/Sensation Assessment: Motor response  R Foot Dorsiflexion: Moderate  L Foot Dorsiflexion: Weak  Muscle Strength Right Arm: Good Strength Against Gravity and Moderate Resistance  Muscle Strength Left  Arm: Good Strength Against Gravity and Moderate Resistance  Muscle Strength Right Leg: Fair Strength against Gravity but No Resistance  Muscle Strength Left Leg: Fair Strength against Gravity but No Resistance  Neuro Additional Assessments: David Coma Scale  EENT (WDL):  WDL Except    Cardio/Pulmonary Assessment  Edema      Respiratory Breath Sounds  RUL Breath Sounds: Clear  RML Breath Sounds: Diminished  RLL Breath Sounds: Diminished  AZ Breath Sounds: Clear  LLL Breath Sounds: Diminished  Cardiac Assessment   Cardiac (WDL):  Within Defined Limits

## 2023-09-10 NOTE — DISCHARGE SUMMARY
Physical Medicine & Rehabilitation Discharge Summary    Admission Date: 8/30/2023    Discharge Date: 9/11/2023     Attending Provider: Sara Diaz MD/PhD    Admission Diagnosis:   Active Hospital Problems    Diagnosis     *Closed right hip fracture, initial encounter ([facility])        Discharge Diagnosis:  Active Hospital Problems    Diagnosis     *Closed right hip fracture, initial encounter ([facility])        HPI per Admission History & Physical:  The patient is a 78 y.o.  female with a past medical history of depression;  who presented on 8/28/2023 11:28 PM with R hip pain after GLF. Per documentation, patient had been walking out of her bathroom and tripped and fell on her right hip. Upon eval in the ED, images showed a proximal femoral diaphyseal periprosthetic fracture. Ortho was consulted, and patient was taken to the OR on 8/29 for ORIF of the R proximal femur periprosthetic fracture performed y Dr. Galvan. Post op, patient is WBAT RLE. Her hospital course has weston notable for post op ABLA and hyperglycemia. Patient has been able to participate with therapies, she is MIN A  for mobility.     Patient was admitted to [facility] on 8/30/2023.     Hospital Course by Problem List:  Right Hip Per-Prosthetic Fracture s/p ORIF Dr. Galvan 8/29/23  PT and OT for mobility and ADLs. Per guidelines, 15 hours per week between PT, OT and/or SLP.  Follow-up Ortho  WBAT RLE  [place] on until outpatient follow up  Sutures out 10-14 days post-op     Pain  PRN Tylenol + Oxycodone. Switch to Hydrocodone/Tylenol on 9/7. Continue Norco  I discussed the risks and benefits of using opiate medications for pain control.  I discussed the risk of addiction, potential for overdose, and respiratory depression (and the potential need for opiate antagonist therapy if this occurs).  I encouraged the patient to take this medication sparingly with the expressed goal of weaning off the medication as soon as is  clinically appropriate.  I informed the patient that we are only able to provide up to a 14 day supply of these medications at discharge and that they will be responsible for requesting any refills needed from their primary care provider or their surgeon.  We discussed the need to safely secure these medications to prevent theft, inadvertent ingestion, or misuse.  Any unused medication should be immediately disposed of through a sanctioned medication disposal program.  We discussed adjunctive pain medications and conservative therapies at length.I answered the patient's questions regarding this treatment, and the patient indicated understanding and willingness to proceed.     ABLA  Hgb drop 9.3-->8.8   Repeat 9.4, will check Fe panel- Fe low. Start Fe supplement. Continue Fe supplement. Repeat labs - 9.1, improving     Depression  No anti-depressant at home     Hyperglycemia  Order Hgb A1c - 5.8  Monitor     Hypotension  Abdominal binder      ADHD   Add home Adderall XR 30mg daily when patient dtr brings.   9/1 Schedule Ritalin 10mg daily - qAM  9/3 Stop Ritalin, add patient's home Adderall. Continue Adderall XR     Skin  Patient at risk for skin breakdown due to debility in areas including sacrum, achilles, elbows and head in addition to other sites. Nursing to assess skin daily.      GI Ppx  Patient on Prilosec for GERD prophylaxis.      Sleep  Melatonin     Bowel   Patient on Senna-docusate for constipation prophylaxis.      Bladder  Proteus Mirabilis UTI on admission  IDFC - remove if no contraindication  Start Ciprofloxacin 8/31, continue 9/1 x 5 days, reviewed sensitivities and Cipro ok  9/3 requiring CIC. Completed Ciprofloxacin     DVT PROPHYLAXIS: Lovenox 30mg SQ q12hrs on admission --> 40mg SQ daily. Discontinue lovenox and start ASA  Switch to ASA 80mg nusrat on D/c per Ortho     HOSPITALIST FOLLOWING: No     CODE STATUS: FULL CODE     DISPO: Lives alone in 1 story apartment on second floor. Elevator  present. Daughter can help intermittently.     Functional Status at Discharge  Eating:  Independent  Eating Description:     Grooming:  Supervision, Standing  Grooming Description:  Supervision for safety, Standing at sink  Bathing:  Minimal Assist  Bathing Description:  Grab bar, Hand held shower, Tub bench, Set-up of equipment, Supervision for safety, Verbal cueing  Upper Body Dressing:  Supervision  Upper Body Dressing Description:  Supervision for safety  Lower Body Dressing:  Standby Assist  Lower Body Dressing Description:  Sock aid, Set-up of equipment, Supervision for safety (to don socks with sock aide)     Walk:  Modified Independent  Distance Walked:  300  Number of Times Distance Was Traveled:  4  Assistive Device:  4 Wheel Walker  Gait Deviation:  Antalgic  Wheelchair:  Supervised  Distance Propelled:  100   Wheelchair Description:  Extra time, Limited by fatigue, Supervision for safety  Stairs Modified Independent  Stairs Description Extra time, Hand rails  Discharge Location: Home  Patient Discharging with Assist of: No One, Patient will be Alone  Level of Supervision Required Upon Discharge: No Supervision  Recommended Equipment for Discharge: 4-Wheeled Walker  Recommeded Services Upon Discharge: Home Health Physical Therapy  Long Term Goals Met: Yes  Comprehension:     Comprehension Description:     Expression:     Expression Description:     Social Interaction:     Social Interaction Description:     Problem Solving:     Problem Solving Description:     Memory:     Memory Description:          Sara WILSON M.D., personally performed a complete drug regimen review and no potential clinically significant medication issues were identified.   Discharge Medication:     Medication List        START taking these medications        Instructions   aspirin 81 MG Chew chewable tablet  Start taking on: September 11, 2023  Commonly known as: Asa   Chew 1 Tablet 2 times a day.  Dose: 81 mg      ferrous sulfate 325 (65 Fe) MG tablet  Start taking on: September 11, 2023   Take 1 Tablet by mouth every morning with breakfast.  Dose: 325 mg     HYDROcodone-acetaminophen 5-325 MG Tabs per tablet  Commonly known as: Norco   Take 1 Tablet by mouth every four hours as needed (severe pain) for up to 7 days.  Dose: 1 Tablet            CONTINUE taking these medications        Instructions   acetaminophen 500 MG Tabs  Commonly known as: Tylenol   Take 2 Tablets by mouth every 6 hours.  Dose: 1,000 mg     ADDERALL XR (30MG) 30 MG XR capsule  Generic drug: amphetamine-dextroamphetamine ER   Take 30 mg by mouth every morning.  Dose: 30 mg     B-12 PO   Take 1 Tablet by mouth every day.  Dose: 1 Tablet     CALCIUM 600 PO   Take 1 Tablet by mouth every day.  Dose: 1 Tablet     D3 PO   Take 1 Tablet by mouth every day.  Dose: 1 Tablet     melatonin 5 mg Tabs   Take 1 Tablet by mouth every evening.  Dose: 5 mg     Zinc 50 MG Tabs   Take 50 mg by mouth every day.  Dose: 50 mg     ZINC PO   Take 1 Tablet by mouth every day.  Dose: 1 Tablet            STOP taking these medications      enoxaparin 40 MG/0.4ML Sosy inj  Commonly known as: Lovenox     ibuprofen 800 MG Tabs  Commonly known as: Motrin     oxyCODONE immediate-release 5 MG Tabs  Commonly known as: Roxicodone     polyethylene glycol/lytes 17 g Pack  Commonly known as: Miralax     senna-docusate 8.6-50 MG Tabs  Commonly known as: Pericolace Or Senokot S              Discharge Diet:  Current Diet Order   Procedures    Diet Order Diet: Regular       Discharge Activity:  As tolerated     Disposition:  Patient to discharge home with family support and community resources.    Equipment:  FWW    Follow-up & Discharge Instructions:  Follow up with your primary care provider (PCP) within 7-10 days of discharge to review your medications and take over your care.     If you develop chest pain, fever, chills, change in neurologic function (weakness, sensation changes, vision  changes), or other concerning sxs, seek immediate medical attention or call 911.      Future Appointments   Date Time Provider Department Center   9/10/2023  1:00 PM Gabi Echevarria, OT [facility] None   9/11/2023 12:30 PM Robbie Yadav, OT/L [facility] None   9/11/2023  1:00 PM Evelyn Redman, PTA [facility] None       Condition on Discharge:  Good    More than 31 minutes was spent on discharging this patient, including face-to-face time, prescription management, and the dictation of this note.    Sara Diaz M.D.    Date of Service: 9/11/2023

## 2023-09-10 NOTE — PROGRESS NOTES
NURSING DAILY NOTE    Name: Barbra العراقي   Date of Admission: 8/30/2023   Admitting Diagnosis: Closed right hip fracture, initial encounter ([facility])  Attending Physician: Sara Diaz M.d.  Allergies: Ampicillin and Keflex [cephalexin]    Safety  Patient Assist  min to mod  Patient Precautions  Fall Risk, Weight Bearing As Tolerated Right Lower Extremity  Precaution Comments  antalgia  Bed Transfer Status  Supervised  Toilet Transfer Status   Minimal Assist  Assistive Devices  Rails, Wheelchair  Oxygen  None - Room Air  Diet/Therapeutic Dining  Current Diet Order   Procedures    Diet Order Diet: Regular     Pill Administration  whole and one at a time   Agitated Behavioral Scale  16  ABS Level of Severity  No Agitation    Fall Risk  Has the patient had a fall this admission?   No  Amie Wright Fall Risk Scoring  15, HIGH RISK  Fall Risk Safety Measures  bed alarm, chair alarm, and poor balance    Vitals  Temperature: 37 °C (98.6 °F)  Temp src: Oral  Pulse: 83  Respiration: 18  Blood Pressure : 121/55  Blood Pressure MAP (Calculated): 77 MM HG  BP Location: Right, Upper Arm  Patient BP Position: Supine     Oxygen  Pulse Oximetry: 95 %  O2 (LPM): 0  O2 Delivery Device: None - Room Air    Bowel and Bladder  Last Bowel Movement  09/08/23  Stool Type  Type 6: Fluffy pieces with ragged edges, a mushy stool  Bowel Device  Bathroom  Continent  Bladder: Did not void (torres in place)   Bowel: Continent movement  Bladder Function  Urine Void (mL):  (moderate)  Number of Times Voided: 1  Urine Color: Yellow  Urine Clarity: Clear  Number of Times Incontinent of Urine: 1  Straight Catheter:  (nurse cath)  Genitourinary Assessment   Bladder Assessment (WDL):  WDL Except  Torres Catheter: Not Applicable  Torres Reasons per MD Order: Acute urinary retention or bladder outlet obstruction  Torres Care: Given with Soap and Water  Urinary Elimination: Catheter (Document on  LDA)  Urine Color: Yellow  Urine Clarity: Clear  Number of Bladder Accidents: 0  Total Number of Bladder of Accidents in Last 7 Days: 0  Number of Times Incontinent of Urine: 1  Bladder Device: Bathroom  Time Void: Yes  Bladder Scan: Post Void  $ Bladder Scan Results (mL): 263  Bladder Medications: No    Skin  Escobar Score   17  Sensory Interventions   Bed Types: Standard/Trauma Mattress  Skin Preventative Measures: Pillows in Use for Support / Positioning  Moisture Interventions  Moisturizers/Barriers: Barrier Wipes      Pain  Pain Rating Scale  4 - Distracts me, can do usual activities  Pain Location  Hip  Pain Location Orientation  Right  Pain Interventions   Medication (see MAR), Rest    ADLs    Bathing   Patient Refused Bathing  Linen Change   Complete  Personal Hygiene  Change Yaritza Pads, Moist Yaritza Wipes, Perineal Care  Chlorhexidine Bath      Oral Care  Brushed Teeth (self)  Teeth/Dentures  Missing Teeth (Comments)  Shave     Nutrition Percentage Eaten  Lunch, Between 50-75% Consumed  Environmental Precautions  Treaded Slipper Socks on Patient, Personal Belongings, Wastebasket, Call Bell etc. in Easy Reach, Bed in Low Position  Patient Turns/Positioning  Patient Turns Self from Side to Side  Patient Turns Assistance/Tolerance  Assistance of One  Bed Positions  Bed Locked, Bed Controls On  Head of Bed Elevated  Self regulated      Psychosocial/Neurologic Assessment  Psychosocial Assessment  Psychosocial (WDL):  Within Defined Limits  Patient Behaviors: Drowsy, Fatigue  Neurologic Assessment  Neuro (WDL): Exceptions to WDL  Level of Consciousness: Alert  Orientation Level: Oriented X4  Cognition: Follows commands, Appropriate attention/concentration  Speech: Clear  Motor Function/Sensation Assessment: Motor response  R Foot Dorsiflexion: Moderate  L Foot Dorsiflexion: Weak  Muscle Strength Right Arm: Good Strength Against Gravity and Moderate Resistance  Muscle Strength Left Arm: Good Strength Against Gravity  and Moderate Resistance  Muscle Strength Right Leg: Fair Strength against Gravity but No Resistance  Muscle Strength Left Leg: Fair Strength against Gravity but No Resistance  Neuro Additional Assessments: [place] Coma Scale  EENT (WDL):  WDL Except    Cardio/Pulmonary Assessment  Edema      Respiratory Breath Sounds  RUL Breath Sounds: Clear  RML Breath Sounds: Diminished  RLL Breath Sounds: Diminished  AZ Breath Sounds: Clear  LLL Breath Sounds: Diminished  Cardiac Assessment   Cardiac (WDL):  Within Defined Limits

## 2023-09-10 NOTE — CARE PLAN
Problem: Knowledge Deficit - Standard  Goal: Patient and family/care givers will demonstrate understanding of plan of care, disease process/condition, diagnostic tests and medications  Outcome: Progressing     Problem: Skin Integrity  Goal: Patient's skin integrity will be maintained or improve  Outcome: Progressing   The patient is Stable - Low risk of patient condition declining or worsening    Shift Goals  Clinical Goals: Safety, pain control  Patient Goals: sleep well, pain control

## 2023-09-10 NOTE — PROGRESS NOTES
"  Physical Medicine & Rehabilitation Progress Note    Encounter Date: 9/10/2023    Chief Complaint: Decreased mobility, weakness    Interval Events (Subjective):  Patient sitting up in room. She reports therapy is going well. Denies pain at rest. Reviewed discharge medications.     _____________________________________  Interdisciplinary Team Conference   Most recent IDT on 9/5/2023    Discharge Date/Disposition:  8/11/23  _____________________________________    Objective:  VITAL SIGNS: BP (!) 143/58   Pulse 99   Temp 37 °C (98.6 °F) (Oral)   Resp 16   Ht 1.651 m (5' 5\")   Wt 61.2 kg (135 lb)   SpO2 94%   BMI 22.47 kg/m²   Gen: NAD  Psych: Mood and affect appropriate  CV: RRR, 0 edema  Resp: CTAB, no upper airway sounds  Abd: NTND  Neuro: AOx4, following commands  Unchanged from 9/9/23    Laboratory Values:  Recent Results (from the past 72 hour(s))   CBC WITH DIFFERENTIAL    Collection Time: 09/09/23  5:14 AM   Result Value Ref Range    WBC 6.6 4.8 - 10.8 K/uL    RBC 3.10 (L) 4.20 - 5.40 M/uL    Hemoglobin 9.1 (L) 12.0 - 16.0 g/dL    Hematocrit 28.6 (L) 37.0 - 47.0 %    MCV 92.3 81.4 - 97.8 fL    MCH 29.4 27.0 - 33.0 pg    MCHC 31.8 (L) 32.2 - 35.5 g/dL    RDW 45.4 35.9 - 50.0 fL    Platelet Count 392 164 - 446 K/uL    MPV 9.8 9.0 - 12.9 fL    Neutrophils-Polys 63.90 44.00 - 72.00 %    Lymphocytes 22.60 22.00 - 41.00 %    Monocytes 8.90 0.00 - 13.40 %    Eosinophils 2.30 0.00 - 6.90 %    Basophils 0.30 0.00 - 1.80 %    Immature Granulocytes 2.00 (H) 0.00 - 0.90 %    Nucleated RBC 0.00 0.00 - 0.20 /100 WBC    Neutrophils (Absolute) 4.22 1.82 - 7.42 K/uL    Lymphs (Absolute) 1.49 1.00 - 4.80 K/uL    Monos (Absolute) 0.59 0.00 - 0.85 K/uL    Eos (Absolute) 0.15 0.00 - 0.51 K/uL    Baso (Absolute) 0.02 0.00 - 0.12 K/uL    Immature Granulocytes (abs) 0.13 (H) 0.00 - 0.11 K/uL    NRBC (Absolute) 0.00 K/uL   Basic Metabolic Panel    Collection Time: 09/09/23  5:14 AM   Result Value Ref Range    Sodium 140 135 - " 145 mmol/L    Potassium 4.2 3.6 - 5.5 mmol/L    Chloride 105 96 - 112 mmol/L    Co2 27 20 - 33 mmol/L    Glucose 102 (H) 65 - 99 mg/dL    Bun 17 8 - 22 mg/dL    Creatinine 0.56 0.50 - 1.40 mg/dL    Calcium 8.6 8.5 - 10.5 mg/dL    Anion Gap 8.0 7.0 - 16.0   MAGNESIUM    Collection Time: 09/09/23  5:14 AM   Result Value Ref Range    Magnesium 1.9 1.5 - 2.5 mg/dL   ESTIMATED GFR    Collection Time: 09/09/23  5:14 AM   Result Value Ref Range    GFR (CKD-EPI) 92 >60 mL/min/1.73 m 2       Medications:  Scheduled Medications   Medication Dose Frequency    ferrous sulfate  325 mg QDAY with Breakfast    amphetamine-dextroamphetamine ER  30 mg QAM    enoxaparin (LOVENOX) injection  40 mg DAILY AT 1800    Pharmacy Consult Request  1 Each PHARMACY TO DOSE    omeprazole  20 mg DAILY    ammonium lactate   BID    senna-docusate  2 Tablet BID    And    polyethylene glycol/lytes  1 Packet DAILY    melatonin  4.5 mg QHS     PRN medications: HYDROcodone-acetaminophen, traMADol, traMADol, Respiratory Therapy Consult, hydrALAZINE, carboxymethylcellulose, benzocaine-menthol, mag hydrox-al hydrox-simeth, ondansetron **OR** ondansetron, traZODone, sodium chloride, acetaminophen, senna-docusate **AND** polyethylene glycol/lytes **AND** magnesium hydroxide **AND** bisacodyl    Diet:  Current Diet Order   Procedures    Diet Order Diet: Regular       Medical Decision Making and Plan:  Right Hip Per-Prosthetic Fracture s/p ORIF Dr. Galvan 8/29/23  PT and OT for mobility and ADLs. Per guidelines, 15 hours per week between PT, OT and/or SLP.  Follow-up Ortho  WBAT RLE  [place] on until outpatient follow up  Sutures out 10-14 days post-op     Pain  PRN Tylenol + Oxycodone. Switch to Hydrocodone/Tylenol on 9/7. Continue Norco  I discussed the risks and benefits of using opiate medications for pain control.  I discussed the risk of addiction, potential for overdose, and respiratory depression (and the potential need for opiate antagonist therapy  if this occurs).  I encouraged the patient to take this medication sparingly with the expressed goal of weaning off the medication as soon as is clinically appropriate.  I informed the patient that we are only able to provide up to a 14 day supply of these medications at discharge and that they will be responsible for requesting any refills needed from their primary care provider or their surgeon.  We discussed the need to safely secure these medications to prevent theft, inadvertent ingestion, or misuse.  Any unused medication should be immediately disposed of through a sanctioned medication disposal program.  We discussed adjunctive pain medications and conservative therapies at length.I answered the patient's questions regarding this treatment, and the patient indicated understanding and willingness to proceed.     ABLA  Hgb drop 9.3-->8.8   Repeat 9.4, will check Fe panel- Fe low. Start Fe supplement. Continue Fe supplement. Repeat labs - 9.1, improving     Depression  No anti-depressant at home     Hyperglycemia  Order Hgb A1c - 5.8  Monitor     Hypotension  Abdominal binder      ADHD   Add home Adderall XR 30mg daily when patient dtr brings.   9/1 Schedule Ritalin 10mg daily - qAM  9/3 Stop Ritalin, add patient's home Adderall. Continue Adderall XR     Skin  Patient at risk for skin breakdown due to debility in areas including sacrum, achilles, elbows and head in addition to other sites. Nursing to assess skin daily.      GI Ppx  Patient on Prilosec for GERD prophylaxis.      Sleep  Melatonin     Bowel   Patient on Senna-docusate for constipation prophylaxis.      Bladder  Proteus Mirabilis UTI on admission  IDFC - remove if no contraindication  Start Ciprofloxacin 8/31, continue 9/1 x 5 days, reviewed sensitivities and Cipro ok  9/3 requiring CIC. Completed Ciprofloxacin     DVT PROPHYLAXIS: Lovenox 30mg SQ q12hrs on admission --> 40mg SQ daily. Discontinue lovenox and start ASA  Switch to ASA 80mg nusrat on  D/c per Ortho     HOSPITALIST FOLLOWING: No     CODE STATUS: FULL CODE     DISPO: Lives alone in 1 story apartment on second floor. Elevator present. Daughter can help intermittently.   ____________________________________    T. Don Diaz MD/PhD  [facility] - Physical Medicine & Rehabilitation   [facility] - Brain Injury Medicine   ____________________________________    Total time:  50 minutes. Time spent included pre-rounding review of vitals and tests, unit/floor time, face-to-face time with the patient including physical examination, care coordination, counseling of patient and/or family, ordering medications/procedures/tests, discussion with CM, PT, OT, SLP and/or other healthcare providers, and documentation in the electronic medical record. Topics discussed included discharge planning, discontinue lovenox, start ASA and follow-up orthopedics.

## 2023-09-10 NOTE — CARE PLAN
Problem: Knowledge Deficit - Standard  Goal: Patient and family/care givers will demonstrate understanding of plan of care, disease process/condition, diagnostic tests and medications  Outcome: Progressing     Problem: Skin Integrity  Goal: Patient's skin integrity will be maintained or improve  Outcome: Progressing   The patient is Stable - Low risk of patient condition declining or worsening    Shift Goals  Clinical Goals: pain control, bladder management  Patient Goals: pain control, rest

## 2023-09-10 NOTE — PROGRESS NOTES
Received shift report from day RN Ardyan and assumed care of patient.  Patient awake, calm and stable, currently positioned in bed for comfort and safety; call light within reach.  Denies pain or discomfort at this time.  Will continue to monitor.

## 2023-09-10 NOTE — THERAPY
Physical Therapy   Discharge Summary   The following note reflects 2 nonconsecutive treatment sessions. The first occurring from 1271-7461 and the second from 0845-0915    Patient Name: Barbra العراقي  Age:  78 y.o., Sex:  female  Medical Record #: [id number]  Today's Date: 9/10/2023     Subjective    Pt is pleasant and cooperative.      Objective       09/10/23 0700   PT Charge Group   PT Gait Training (Units) 2   PT Therapeutic Exercise (Units) 2   PT Therapeutic Activities (Units) 2   PT Total Time Spent   PT Individual Total Time Spent (Mins) 90   Precautions   Precautions Fall Risk;Weight Bearing As Tolerated Right Lower Extremity   Pain 0 - 10 Group   Location Hip   Location Orientation Right   Pain Rating Scale (NPRS) 4   Gait Functional Level of Assist    Gait Level Of Assist Modified Independent   Assistive Device 4 Wheel Walker   Distance (Feet) 300   # of Times Distance was Traveled 4   Deviation Antalgic   Stairs Functional Level of Assist   Level of Assist with Stairs Modified Independent   # of Stairs Climbed 12   Stairs Description Extra time;Hand rails   Transfer Functional Level of Assist   Bed, Chair, Wheelchair Transfer Modified Independent   Bed Mobility    Supine to Sit Independent   Sit to Supine Independent   Sit to Stand Modified Independent   Rolling Independent   Interdisciplinary Plan of Care Collaboration   Patient Position at End of Therapy In Bed;Bed Alarm On;Phone within Reach;Call Light within Reach;Tray Table within Reach   Roll Left and Right   Assistance Needed Independent   CARE Score - Roll Left and Right 6   Sit to Lying   Assistance Needed Independent   CARE Score - Sit to Lying 6   Lying to Sitting on Side of Bed   Assistance Needed Independent   CARE Score - Lying to Sitting on Side of Bed 6   Sit to Stand   Assistance Needed Independent   CARE Score - Sit to Stand 6   Chair/Bed-to-Chair Transfer   Assistance Needed Independent;Adaptive equipment   CARE Score - Chair/Bed-to-Chair  Transfer 6   Car Transfer   Assistance Needed Independent   CARE Score - Car Transfer 6   Walk 10 Feet   Assistance Needed Independent;Adaptive equipment   CARE Score - Walk 10 Feet 6   Walk 50 Feet with Two Turns   Assistance Needed Independent;Adaptive equipment   CARE Score - Walk 50 Feet with Two Turns 6   Walk 150 Feet   Assistance Needed Independent;Adaptive equipment   CARE Score - Walk 150 Feet 6   Walking 10 Feet on Uneven Surfaces   Assistance Needed Independent;Adaptive equipment   CARE Score - Walking 10 Feet on Uneven Surfaces 6   1 Step (Curb)   Assistance Needed Independent;Adaptive equipment   CARE Score - 1 Step (Curb) 6   4 Steps   Assistance Needed Independent;Adaptive equipment   CARE Score - 4 Steps 6   12 Steps   Assistance Needed Independent;Adaptive equipment   CARE Score - 12 Steps 6   Picking Up Object   Assistance Needed Independent;Adaptive equipment   CARE Score - Picking Up Object 6   Wheel 50 Feet with Two Turns   Reason if not Attempted Activity not applicable   CARE Score - Wheel 50 Feet with Two Turns 9   Wheel 150 Feet   Reason if not Attempted Activity not applicable   CARE Score - Wheel 150 Feet 9   P.T. Discharge Summary   Discharge Location Home   Patient Discharging with Assist of No One, Patient will be Alone   Level of Supervision Required Upon Discharge No Supervision   Recommended Equipment for Discharge 4-Wheeled Walker   Recommeded Services Upon Discharge Home Health Physical Therapy   Long Term Goals Met Yes   Discharge Instructions to Patient   Weight Bearing Status - Patient Should Bear Weight as Tolerated Right Leg   Device Recommended for Ambulation 4-Wheeled Walker       Pt supine in bed first session.  Supine to sit independently. Pt performs sit<>stand transfers with 4WW independently. She ambulates 3 bouts of 250-300' independently with FWW. Pt performed 12 stairs independently. She performed curb transfer independently with 4WW. Pt picked up object from floor  independently. Sit to supine independently. Pt remained supine with bed alarm on and all needs in reach.     Second session-   Pt supine in bed. Supine to sit independently. Pt ambulated over 500' with 4WW independently. 5 mins nu step BUE/BLE lvl 2. Pt performed ramp independently with 4WW.     Assessment    Pt tolerated session well and is appropriate for discharge home independently. She does not have stairs to enter her apartmend.   Strengths: Able to follow instructions, Effective communication skills, Good insight into deficits/needs, Independent prior level of function, Motivated for self care and independence, Pleasant and cooperative, Supportive family, Willingly participates in therapeutic activities  Barriers: Decreased endurance, Generalized weakness, Impaired activity tolerance, Impaired balance, Limited mobility, Pain, Pain poorly managed    Plan    Discharge home with home health tomorrow.     Passport items to be completed:  Get in/out of bed safely, in/out of a vehicle, safely use mobility device, walk or wheel around home/community, navigate up and down stairs, show how to get up/down from the ground, ensure home is accessible, demonstrate HEP, complete caregiver training    Physical Therapy Problems (Active)       Problem: Balance       Dates: Start:  08/31/23         Goal: STG-Within one week, patient will maintain static standing 3min with unilateral UE support SPV       Dates: Start:  08/31/23    Expected End:  09/22/23         Goal Note filed on 09/05/23 1235 by Madelyn Hairston DPT       Need further assessment                  Problem: Mobility       Dates: Start:  08/31/23         Goal: STG-Within one week, patient will propel wheelchair community 400ft mod I indoors       Dates: Start:  08/31/23    Expected End:  09/22/23         Goal Note filed on 09/05/23 1235 by Madelyn Hairston DPT       Dec activity tolerance               Goal: STG-Within one week, patient will ambulate household  distance 25ft x 4 with FWW SBA       Dates: Start:  08/31/23    Expected End:  09/22/23         Goal Note filed on 09/05/23 1235 by Madelyn Hairston DPT       60 ft  CGA, bradykinetic FWW, partially met              Goal: STG-Within one week, patient will ambulate up/down a curb with FWW mod assist       Dates: Start:  08/31/23    Expected End:  09/22/23         Goal Note filed on 09/05/23 1235 by Madelyn Hairston DPT       NT, TBD, safety concerns                 Problem: Mobility Transfers       Dates: Start:  08/31/23         Goal: STG-Within one week, patient will transfer bed to chair SBA SPT (5 out of 5 trials)       Dates: Start:  08/31/23    Expected End:  09/22/23         Goal Note filed on 09/05/23 1235 by Madelyn Hairston DPT       CGA, safety                  Problem: PT-Long Term Goals       Dates: Start:  08/31/23         Goal: LTG-By discharge, patient will tolerate standing 5min at sink to complete ADLs mod I       Dates: Start:  08/31/23    Expected End:  09/22/23            Goal: LTG-By discharge, patient will ambulate 150ft x 4 with FWW mod I        Dates: Start:  08/31/23    Expected End:  09/22/23            Goal: LTG-By discharge, patient will transfer one surface to another mod I SPT safely and consistently       Dates: Start:  08/31/23    Expected End:  09/22/23            Goal: LTG-By discharge, patient will perform home exercise program seated/standing for LE strengthening to be done 3x/day in safe, independent home environment       Dates: Start:  08/31/23    Expected End:  09/22/23            Goal: LTG-By discharge, patient will up/down curb with FWW CGA       Dates: Start:  08/31/23    Expected End:  09/22/23

## 2023-09-10 NOTE — THERAPY
Occupational Therapy   Discharge Summary     Patient Name: Barbra العراقي  Age:  78 y.o., Sex:  female  Medical Record #: [id number]  Today's Date: 9/10/2023     Precautions  Precautions: Fall Risk, Weight Bearing As Tolerated Right Lower Extremity  Comments: antalgia         Subjective    Pt participated in 2 sessions this date (7499-7181 and 7413-0816) and was pleasant, agreeable, and motivated for both.       Objective       09/10/23 1031   OT Charge Group   OT Self Care / ADL (Units) 4   OT Therapy Activity (Units) 2   OT Total Time Spent   OT Individual Total Time Spent (Mins) 90   Non Verbal Descriptors   Non Verbal Scale  Calm   Cognition    Level of Consciousness Alert   Sleep/Wake Cycle   Sleep & Rest Awake;Resting   Functional Level of Assist   Grooming Supervision;Standing   Grooming Description Standing at sink;Supervision for safety   Bathing Standby Assist   Bathing Description Grab bar;Hand held shower;Increased time  (pt reports that she plans to sponge bathe at home)   Upper Body Dressing Supervision   Upper Body Dressing Description Supervision for safety   Lower Body Dressing Standby Assist   Lower Body Dressing Description Reacher;Increased time;Supervision for safety   Toileting Supervision   Toileting Description Grab bar;Increased time;Supervision for safety   Bed, Chair, Wheelchair Transfer Modified Independent   Bed Chair Wheelchair Transfer Description Verbal cueing;Increased time;Supervision for safety   Toilet Transfers Standby Assist   Toilet Transfer Description Grab bar;Increased time;Supervision for safety   Tub / Shower Transfers Contact Guard Assist   Tub Shower Transfer Description Grab bar;Increased time;Supervision for safety;Set-up of equipment  (pt reports plan to sponge bathe at home)   Balance   Sitting Balance (Static) Good   Sitting Balance (Dynamic) Fair +   Bed Mobility    Supine to Sit Independent   Sit to Supine Independent   Scooting Modified Independent   Eating  "  Assistance Needed Independent   CARE Score - Eating 6   Oral Hygiene   Assistance Needed Independent   CARE Score - Oral Hygiene 6   Toileting Hygiene   Assistance Needed Supervision   CARE Score - Toileting Hygiene 4   Shower/Bathe Self   Assistance Needed Supervision   CARE Score - Shower/Bathe Self 4   Upper Body Dressing   Assistance Needed Independent   CARE Score - Upper Body Dressing 6   Lower Body Dressing   Assistance Needed Adaptive equipment;Supervision   CARE Score - Lower Body Dressing 4   Putting On/Taking Off Footwear   Assistance Needed Set-up / clean-up;Adaptive equipment   CARE Score - Putting On/Taking Off Footwear 5   Toilet Transfer   Assistance Needed Supervision   CARE Score - Toilet Transfer 4   Cognitive Pattern Assessment   Cognitive Pattern Assessment Used BIMS   Brief Interview for Mental Status (BIMS)   Repetition of Three Words (First Attempt) 3   Temporal Orientation: Year Correct   Temporal Orientation: Month Accurate within 5 days   Temporal Orientation: Day Correct   Recall: \"Sock\" Yes, no cue required   Recall: \"Blue\" Yes, no cue required   Recall: \"Bed\" Yes, no cue required   BIMS Summary Score 15   Confusion Assessment Method (CAM)   Is there evidence of an acute change in mental status from the patient's baseline? No   Inattention Behavior not present   Disorganized thinking Behavior not present   Altered level of consciousness Behavior not present   Discharge Summary    Discharge Location  Home   Patient Discharging with Assist of Family    Level of Supervision Required 24 Hour Supervision   Recommended Equipment for Discharge 4-Wheeled Walker;Shower Chair;Grab Bars by Toilet;Grab Bars in Tub / Shower;Reacher;Sock Aid;Long Handled Shoe Horn;Dressing Stick;Tub Transfer Bench   Recommended Services Upon Discharge Home Health Occupational Therapy   Long Term Goals Met 2   Long Term Goals Not Met 0   Criteria for Termination of Services Maximum Function Achieved for Inpatient " Rehabilitation   Pt transferred from bed<>w/c mod I w/ 4WW, ambulated to front lobby and navigated various level seats mod I from community mobility.     Assessment    Pt demos good awareness of skills and deficits and plans to have supervision at her home. OT recommending 24 hour supervision as she transitions homes and HH OT evaluation.     Strengths: Able to follow instructions, Alert and oriented, Effective communication skills, Good insight into deficits/needs, Independent prior level of function, Motivated for self care and independence, Pleasant and cooperative, Supportive family, Willingly participates in therapeutic activities  Barriers: Decreased endurance, Constipation, Generalized weakness, Impaired balance, Limited mobility, Pain    Plan    D/c 9/11/2023        Occupational Therapy Goals (Active)       There are no active problems.

## 2023-09-10 NOTE — CARE PLAN
The patient is Stable - Low risk of patient condition declining or worsening    Shift Goals  Clinical Goals: bladder mgsafety, pain mgmt, sleep  Patient Goals: pain comtrol, sleep    Progress made toward(s) clinical / shift goals:        Problem: Pain - Standard  Goal: Alleviation of pain or a reduction in pain to the patient’s comfort goal  Outcome: Progressing  Flowsheets  Taken 9/9/2023 2112  Pain Rating Scale (NPRS): 6  Taken 9/9/2023 1900  Non Verbal Scale: Sleeping  Note: Patient able to verbalize pain level and verbalize an acceptable level of pain. Will continue to administer meds per orders and continue to monitor pt.

## 2023-09-11 ENCOUNTER — APPOINTMENT (OUTPATIENT)
Dept: OCCUPATIONAL THERAPY | Facility: REHABILITATION | Age: 78
End: 2023-09-11
Attending: PHYSICAL MEDICINE & REHABILITATION
Payer: MEDICARE

## 2023-09-11 ENCOUNTER — APPOINTMENT (OUTPATIENT)
Dept: PHYSICAL THERAPY | Facility: REHABILITATION | Age: 78
End: 2023-09-11
Attending: PHYSICAL MEDICINE & REHABILITATION
Payer: MEDICARE

## 2023-09-11 ENCOUNTER — PHARMACY VISIT (OUTPATIENT)
Dept: PHARMACY | Facility: MEDICAL CENTER | Age: 78
End: 2023-09-11
Payer: MEDICARE

## 2023-09-11 VITALS
BODY MASS INDEX: 22.49 KG/M2 | RESPIRATION RATE: 14 BRPM | HEIGHT: 65 IN | SYSTOLIC BLOOD PRESSURE: 137 MMHG | WEIGHT: 135 LBS | DIASTOLIC BLOOD PRESSURE: 64 MMHG | TEMPERATURE: 98.9 F | OXYGEN SATURATION: 99 % | HEART RATE: 99 BPM

## 2023-09-11 PROCEDURE — A9270 NON-COVERED ITEM OR SERVICE: HCPCS | Performed by: PHYSICAL MEDICINE & REHABILITATION

## 2023-09-11 PROCEDURE — 97530 THERAPEUTIC ACTIVITIES: CPT

## 2023-09-11 PROCEDURE — RXMED WILLOW AMBULATORY MEDICATION CHARGE: Performed by: PHYSICAL MEDICINE & REHABILITATION

## 2023-09-11 PROCEDURE — 97530 THERAPEUTIC ACTIVITIES: CPT | Mod: CQ

## 2023-09-11 PROCEDURE — 99239 HOSP IP/OBS DSCHRG MGMT >30: CPT | Performed by: PHYSICAL MEDICINE & REHABILITATION

## 2023-09-11 PROCEDURE — 700102 HCHG RX REV CODE 250 W/ 637 OVERRIDE(OP): Performed by: PHYSICAL MEDICINE & REHABILITATION

## 2023-09-11 RX ORDER — FERROUS SULFATE 325(65) MG
325 TABLET ORAL
Qty: 30 TABLET | Refills: 2 | Status: SHIPPED | OUTPATIENT
Start: 2023-09-11

## 2023-09-11 RX ORDER — DEXTROAMPHETAMINE SACCHARATE, AMPHETAMINE ASPARTATE MONOHYDRATE, DEXTROAMPHETAMINE SULFATE AND AMPHETAMINE SULFATE 7.5; 7.5; 7.5; 7.5 MG/1; MG/1; MG/1; MG/1
30 CAPSULE, EXTENDED RELEASE ORAL EVERY MORNING
Qty: 30 CAPSULE | Refills: 0 | Status: SHIPPED | OUTPATIENT
Start: 2023-09-11 | End: 2023-10-11

## 2023-09-11 RX ORDER — HYDROCODONE BITARTRATE AND ACETAMINOPHEN 5; 325 MG/1; MG/1
1 TABLET ORAL EVERY 4 HOURS PRN
Qty: 21 TABLET | Refills: 0 | Status: SHIPPED | OUTPATIENT
Start: 2023-09-11 | End: 2023-09-18

## 2023-09-11 RX ORDER — ASPIRIN 81 MG/1
81 TABLET, CHEWABLE ORAL 2 TIMES DAILY
Qty: 100 TABLET | Refills: 2 | Status: SHIPPED | OUTPATIENT
Start: 2023-09-11

## 2023-09-11 RX ADMIN — HYDROCODONE BITARTRATE AND ACETAMINOPHEN 1 TABLET: 5; 325 TABLET ORAL at 07:55

## 2023-09-11 RX ADMIN — OMEPRAZOLE 20 MG: 20 CAPSULE, DELAYED RELEASE ORAL at 07:55

## 2023-09-11 RX ADMIN — SENNOSIDES AND DOCUSATE SODIUM 2 TABLET: 50; 8.6 TABLET ORAL at 07:55

## 2023-09-11 RX ADMIN — FERROUS SULFATE TAB 325 MG (65 MG ELEMENTAL FE) 325 MG: 325 (65 FE) TAB at 07:55

## 2023-09-11 RX ADMIN — DEXTROAMPHETAMINE SACCHARATE, AMPHETAMINE ASPARTATE MONOHYDRATE, DEXTROAMPHETAMINE SULFATE AND AMPHETAMINE SULFATE 30 MG: 7.5; 7.5; 7.5; 7.5 CAPSULE, EXTENDED RELEASE ORAL at 09:00

## 2023-09-11 RX ADMIN — ASPIRIN 81 MG 81 MG: 81 TABLET ORAL at 08:02

## 2023-09-11 ASSESSMENT — PATIENT HEALTH QUESTIONNAIRE - PHQ9
1. LITTLE INTEREST OR PLEASURE IN DOING THINGS: NOT AT ALL
2. FEELING DOWN, DEPRESSED, IRRITABLE, OR HOPELESS: NOT AT ALL
SUM OF ALL RESPONSES TO PHQ9 QUESTIONS 1 AND 2: 0

## 2023-09-11 ASSESSMENT — ACTIVITIES OF DAILY LIVING (ADL): BED_CHAIR_WHEELCHAIR_TRANSFER_DESCRIPTION: ADAPTIVE EQUIPMENT

## 2023-09-11 ASSESSMENT — PAIN DESCRIPTION - PAIN TYPE: TYPE: SURGICAL PAIN;ACUTE PAIN

## 2023-09-11 NOTE — DISCHARGE INSTRUCTIONS
[facility] NURSING DISCHARGE INSTRUCTIONS    Blood Pressure : 135/76  Weight: 61.2 kg (135 lb)  Nursing recommendations for Barbra العراقي at time of discharge are as follows:  Client verbalized understanding of all discharge instructions and prescriptions.     Review all your home medications and newly ordered medications with your doctor and/or pharmacist. Follow medication instructions as directed by your doctor and/or pharmacist.    Pain Management:   Discharge Pain Medication Instructions:  Comfort Goal: Comfort with Movement, Perform Activity, Sleep Comfortably  Notify your primary care provider if pain is unrelieved with these measures, if the pain is new, or increased in intensity.    Discharge Skin Characteristics:    Discharge Skin Exam:    If You Have Surgical Incisions / Wounds:  Monitor surgical site(s) for signs of increased swelling, redness or symptoms of drainage from the site or fever as this could indicate signs and symptoms of infection. If these symptoms are noted, notifiy your primary care provider.      Discharge Safety Instructions:       Discharge Safety Concerns:    The interdisciplinary team has made recommendation that you should have adult supervision in the house due to balance problem, history of falls, weakness, and unsteady gait  Anti-embolic stockings are not required to increase circulation to the lower extremities.    Discharge Diet:    regular   Discharge Liquids: thin   Discharge Bowel Function:  continent  Please contact your primary care physician for any changes in bowel habits.  Discharge Bowel Program:  none  Discharge Bladder Function:  continent  Discharge Urinary Devices:  none      Nursing Discharge Plan:       Understanding Your Risk for Falls  Each year, millions of people have serious injuries from falls. It is important to understand your risk for falling. Talk with your health care provider about your risk and what you can do to lower it. There  are actions you can take at home to lower your risk and prevent falls.  If you do have a serious fall, make sure to tell your health care provider. Falling once raises your risk of falling again.  How can falls affect me?  Serious injuries from falls are common. These include:  Broken bones, such as hip fractures.  Head injuries, such as traumatic brain injuries (TBI) or concussion.  A fear of falling can cause you to avoid activities and stay at home. This can make your muscles weaker and actually raise your risk for a fall.  What can increase my risk?  There are a number of risk factors that increase your risk for falling. The more risk factors you have, the higher your risk of falling. Serious injuries from a fall happen most often to people older than age 65. Children and young adults ages 15-29 are also at higher risk.  Common risk factors include:  Weakness in the lower body.  Lack (deficiency) of vitamin D.  Being generally weak or confused due to long-term (chronic) illness.  Dizziness or balance problems.  Poor vision.  Medicines that cause dizziness or drowsiness. These can include medicines for your blood pressure, heart, anxiety, insomnia, or edema, as well as pain medicines and muscle relaxants.  Other risk factors include:  Drinking alcohol.  Having had a fall in the past.  Having depression.  Having foot pain or wearing improper footwear.  Working at a dangerous job.  Having any of the following in your home:  Tripping hazards, such as floor clutter or loose rugs.  Poor lighting.  Pets.  Having dementia or memory loss.  What actions can I take to lower my risk of falling?         Physical activity  Maintain physical fitness. Do strength and balance exercises. Consider taking a regular class to build strength and balance. Yoga and cherie chi are good options.  Vision  Have your eyes checked every year and your vision prescription updated as needed.  Walking aids and footwear  Wear nonskid shoes. Do not  wear high heels.  Do not walk around the house in socks or slippers.  Use a cane or walker as told by your health care provider.  Home safety  Attach secure railings on both sides of your stairs.  Install grab bars for your tub, shower, and toilet. Use a bath mat in your tub or shower.  Use good lighting in all rooms. Keep a flashlight near your bed.  Make sure there is a clear path from your bed to the bathroom. Use night-lights.  Do not use throw rugs. Make sure all carpeting is taped or tacked down securely.  Remove all clutter from walkways and stairways, including extension cords.  Repair uneven or broken steps.  Avoid walking on icy or slippery surfaces. Walk on the grass instead of on icy or slick sidewalks. Use ice melt to get rid of ice on walkways.  Use a cordless phone.  Questions to ask your health care provider  Can you help me check my risk for a fall?  Do any of my medicines make me more likely to fall?  Should I take a vitamin D supplement?  What exercises can I do to improve my strength and balance?  Should I make an appointment to have my vision checked?  Do I need a bone density test to check for weak bones or osteoporosis?  Would it help to use a cane or a walker?  Where to find more information  Centers for Disease Control and PreventionHEATHER: www.cdc.gov  Community-Based Fall Prevention Programs: www.cdc.gov  National [place] on Aging: www.vivian.nih.gov  Contact a health care provider if:  You fall at home.  You are afraid of falling at home.  You feel weak, drowsy, or dizzy.  Summary  Serious injuries from a fall happen most often to people older than age 65. Children and young adults ages 15-29 are also at higher risk.  Talk with your health care provider about your risks for falling and how to lower those risks.  Taking certain precautions at home can lower your risk for falling.  If you fall, always tell your health care provider.  This information is not intended to replace advice given  to you by your health care provider. Make sure you discuss any questions you have with your health care provider.  Document Revised: 07/21/2021 Document Reviewed: 07/21/2021  [employer] Patient Education © 2023 [employer]           Case Management Discharge Instructions:   Discharge Location:    Agency Name/Address/Phone:    Home Health:    Outpatient Services:    DME Provider/Phone:    Medical Equipment Ordered:    Prescription Faxed to:        Discharge Medication Instructions:  Below are the medications your physician expects you to take upon discharge:

## 2023-09-11 NOTE — CARE PLAN
The patient is Stable - Low risk of patient condition declining or worsening    Shift Goals  Clinical Goals: Safety, pain control  Patient Goals: sleep well, pain control    Progress made toward(s) clinical / shift goals:     Problem: Skin Integrity  Goal: Patient's skin integrity will be maintained or improve  Outcome: Progressing  Note: Patient's skin remains intact and free from new or accidental injury this shift.  Will continue to monitor.      Problem: Infection  Goal: Patient will remain free from infection  9/11/2023 0729 by Monalisa Vázquez R.N.  Outcome: Progressing  Note: Pt is able to verbalize s/s of infection: redness of area, fever, pain.  Will continue to monitor pt    9/11/2023 0522 by Monalisa Vázquez R.N.  Outcome: Progressing  Note: Pt is able to verbalize s/s of infection: redness of area, fever, pain.

## 2023-09-11 NOTE — THERAPY
"Physical Therapy   Daily Treatment     Patient Name: Barbra العراقي  Age:  78 y.o., Sex:  female  Medical Record #: [id number]  Today's Date: 9/11/2023     Precautions  Precautions: Fall Risk, Weight Bearing As Tolerated Right Lower Extremity  Comments: antalgia    Subjective    agreeable     Objective       09/11/23 1301   PT Charge Group   PT Therapeutic Activities (Units) 1   Supervising Physical Therapist Paige Dickey   PT Total Time Spent   PT Individual Total Time Spent (Mins) 15   Interdisciplinary Plan of Care Collaboration   IDT Collaboration with  ;Occupational Therapist;Family / Caregiver   Patient Position at End of Therapy In Bed   Collaboration Comments FT, daughter present     Daughter present in the room with patient. Pt has case management questions. I asked the patients daughter if she would accompany us to the gym to practice a curb and she responded   \"She wont have to go up a curb, she has not stairs either\"  \"She just needs to no go anywhere that she isnt confident going\"    Assessment    Therapist reviewed passport and pt's CLOF, all questions answered  Strengths: Able to follow instructions, Effective communication skills, Good insight into deficits/needs, Independent prior level of function, Motivated for self care and independence, Pleasant and cooperative, Supportive family, Willingly participates in therapeutic activities  Barriers: Decreased endurance, Generalized weakness, Impaired activity tolerance, Impaired balance, Limited mobility, Pain, Pain poorly managed    Plan    DC     Physical Therapy Problems (Active)       There are no active problems.            "

## 2023-09-11 NOTE — CARE PLAN
The patient is Stable - Low risk of patient condition declining or worsening    Shift Goals  Clinical Goals: Safety, pain control  Patient Goals: sleep well, pain control    Progress made toward(s) clinical / shift goals:      Problem: Infection  Goal: Patient will remain free from infection  Outcome: Progressing  Note: Pt is able to verbalize s/s of infection: redness of area, fever, pain.       Problem: Skin Integrity  Goal: Patient's skin integrity will be maintained or improve  Outcome: Progressing  Note: Patient's skin remains intact and free from new or accidental injury this shift.  Will continue to monitor.

## 2023-09-11 NOTE — PROGRESS NOTES
NURSING DAILY NOTE    Name: Barbra العراقي   Date of Admission: 8/30/2023   Admitting Diagnosis: Closed right hip fracture, initial encounter ([facility]ly [facility])  Attending Physician: Sara Diaz M.d.  Allergies: Ampicillin and Keflex [cephalexin]    Safety  Patient Assist  min to mod  Patient Precautions  Fall Risk, Weight Bearing As Tolerated Right Lower Extremity  Precaution Comments  antalgia  Bed Transfer Status  Modified Independent  Toilet Transfer Status   Standby Assist  Assistive Devices  Rails  Oxygen  None - Room Air  Diet/Therapeutic Dining  Current Diet Order   Procedures    Diet Order Diet: Regular     Pill Administration  whole  Agitated Behavioral Scale  16  ABS Level of Severity  No Agitation    Fall Risk  Has the patient had a fall this admission?   No  Amie Wright Fall Risk Scoring  15, HIGH RISK  Fall Risk Safety Measures  bed alarm, chair alarm, and poor balance    Vitals  Temperature: 37 °C (98.6 °F)  Temp src: Oral  Pulse: 99  Respiration: 16  Blood Pressure : (!) 143/58  Blood Pressure MAP (Calculated): 86 MM HG  BP Location: Left, Upper Arm  Patient BP Position: Supine     Oxygen  Pulse Oximetry: 94 %  O2 (LPM): 0  O2 Delivery Device: None - Room Air    Bowel and Bladder  Last Bowel Movement  09/10/23  Stool Type  Type 6: Fluffy pieces with ragged edges, a mushy stool  Bowel Device  Bathroom  Continent  Bladder: Did not void (torres in place)   Bowel: Continent movement  Bladder Function  Urine Void (mL): 0 ml  Number of Times Voided: 1  Urine Color: Unable To Evaluate  Urine Clarity: Clear  Number of Times Incontinent of Urine: 1  Straight Catheter: 300 ml  Genitourinary Assessment   Bladder Assessment (WDL):  WDL Except  Torres Catheter: Not Applicable  Torres Reasons per MD Order: Acute urinary retention or bladder outlet obstruction  Torres Care: Given with Soap and Water  Urinary Elimination: Catheter (Document on LDA)  Urine Color:  Unable To Evaluate  Urine Clarity: Clear  Number of Bladder Accidents: 0  Total Number of Bladder of Accidents in Last 7 Days: 0  Number of Times Incontinent of Urine: 1  Bladder Device: Bathroom, Absorbent Brief (Pull Up)  Time Void: Yes  Bladder Scan: Post Void  $ Bladder Scan Results (mL): 135  Bladder Medications: No    Skin  Escobar Score   17  Sensory Interventions   Bed Types: Standard/Trauma Mattress  Skin Preventative Measures: Pillows in Use for Support / Positioning  Moisture Interventions  Moisturizers/Barriers: Barrier Wipes      Pain  Pain Rating Scale  5 - Interrupts some activities  Pain Location  Hip  Pain Location Orientation  Right  Pain Interventions   Medication (see MAR)    ADLs    Bathing   Patient Refused Bathing  Linen Change   Complete  Personal Hygiene  Change Yaritza Pads, Moist Yaritza Wipes, Perineal Care  Chlorhexidine Bath      Oral Care  Brushed Teeth (self)  Teeth/Dentures  Missing Teeth (Comments)  Shave     Nutrition Percentage Eaten  *  * Meal *  *, Breakfast, Between 50-75% Consumed (50%)  Environmental Precautions  Treaded Slipper Socks on Patient, Personal Belongings, Wastebasket, Call Bell etc. in Easy Reach, Transferred to Stronger Side, Bed in Low Position  Patient Turns/Positioning  Patient Turns Self from Side to Side  Patient Turns Assistance/Tolerance  Assistance of One  Bed Positions  Bed Locked, Bed Controls On  Head of Bed Elevated  Self regulated      Psychosocial/Neurologic Assessment  Psychosocial Assessment  Psychosocial (WDL):  Within Defined Limits  Patient Behaviors: Fatigue  Neurologic Assessment  Neuro (WDL): Exceptions to WDL  Level of Consciousness: Alert  Orientation Level: Oriented X4  Cognition: Follows commands, Appropriate attention/concentration  Speech: Clear  Motor Function/Sensation Assessment: Motor response  R Foot Dorsiflexion: Moderate  L Foot Dorsiflexion: Weak  Muscle Strength Right Arm: Good Strength Against Gravity and Moderate Resistance  Muscle  Strength Left Arm: Good Strength Against Gravity and Moderate Resistance  Muscle Strength Right Leg: Fair Strength against Gravity but No Resistance  Muscle Strength Left Leg: Fair Strength against Gravity but No Resistance  Neuro Additional Assessments: David Coma Scale  EENT (WDL):  WDL Except    Cardio/Pulmonary Assessment  Edema      Respiratory Breath Sounds  RUL Breath Sounds: Clear  RML Breath Sounds: Diminished  RLL Breath Sounds: Diminished  AZ Breath Sounds: Clear  LLL Breath Sounds: Diminished  Cardiac Assessment   Cardiac (WDL):  Within Defined Limits

## 2023-09-11 NOTE — PROGRESS NOTES
Patient discharged to home per order.  Discharge instructions reviewed with patient; she verbalized understanding and signed copies placed in chart.  Patient has all belongings; signed copy of form in chart.  Patient left facility at 1410 via friend's taxi service accompanied by rehab staff and friend.  Have enjoyed working with this pleasant patient.

## 2023-09-11 NOTE — THERAPY
Occupational Therapy  Daily Treatment     Patient Name: Barbra العراقي  Age:  78 y.o., Sex:  female  Medical Record #: [id number]  Today's Date: 9/11/2023     Precautions  Precautions: (P) Fall Risk, Weight Bearing As Tolerated Right Lower Extremity  Comments: antalgia         Subjective    Patient was resting in bed upon OT arrival to room.  Daughter had just arrived for training.      Objective       09/11/23 1231   OT Charge Group   OT Therapy Activity (Units) 1   OT Total Time Spent   OT Individual Total Time Spent (Mins) 15   Precautions   Precautions Fall Risk;Weight Bearing As Tolerated Right Lower Extremity   Functional Level of Assist   Bed, Chair, Wheelchair Transfer Modified Independent   Bed Chair Wheelchair Transfer Description Adaptive equipment  (4ww)   Tub / Shower Transfers Modified Independent   Tub Shower Transfer Description Shower bench;Adaptive equipment   Interdisciplinary Plan of Care Collaboration   IDT Collaboration with  Family / Caregiver   Patient Position at End of Therapy In Bed;Family / Friend in Room   Collaboration Comments family training with daughter     OT verbally reviewed patient's CLOF regarding self care and bathroom transfers with daughter.  Patient completed functional mobility with 4ww to bathroom and stood at sink to brush teeth mod I.  She then demonstrated a mod I toilet transfer and walked with mod I to the practice bathroom for tub transfer with bench.  Discussed patient's CLOF with IADLs as well.      Assessment    Patient and her daughter were talking over therapist throughout session making it difficult to information across to the daughter.  OT recommended a tub bench for tub/shower transfers for home.  She is ready to d/c home.   Strengths: Able to follow instructions, Alert and oriented, Effective communication skills, Good insight into deficits/needs, Independent prior level of function, Motivated for self care and independence, Pleasant and cooperative, Supportive  family, Willingly participates in therapeutic activities  Barriers: Decreased endurance, Constipation, Generalized weakness, Impaired balance, Limited mobility, Pain    Plan    D/C home    Occupational Therapy Goals (Active)       There are no active problems.

## 2023-09-11 NOTE — CARE PLAN
Problem: Knowledge Deficit - Standard  Goal: Patient and family/care givers will demonstrate understanding of plan of care, disease process/condition, diagnostic tests and medications  Outcome: Met     Problem: Discharge Barriers/Planning  Goal: Patient's continuum of care needs are met  Outcome: Met     Problem: Psychosocial  Goal: Patient's level of anxiety will decrease  Outcome: Met  Goal: Patient's ability to verbalize feelings about condition will improve  Outcome: Met  Goal: Patient's ability to re-evaluate and adapt role responsibilities will improve  Outcome: Met  Goal: Patient and family will demonstrate ability to cope with life altering diagnosis and/or procedure  Outcome: Met  Goal: Spiritual and cultural needs incorporated into hospitalization  Outcome: Met     Problem: Hemodynamics  Goal: Patient's hemodynamics, fluid balance and neurologic status will be stable or improve  Outcome: Met     Problem: Respiratory  Goal: Patient will understand use and administration of respiratory medications to improve respiratory function  Outcome: Met     Problem: Risk for Aspiration  Goal: Patient's risk for aspiration will be absent or decrease  Outcome: Met     Problem: Bladder / Voiding  Goal: Patient will establish and maintain regular urinary output  Outcome: Met  Goal: Patient will establish and maintain bladder regimen  Outcome: Met     Problem: Neurogenic Bladder  Goal: Patient will demonstrate ability to take care of indwelling catheter  Outcome: Met  Goal: Patient will demonstrate self-cath technique using clean technique and care of the catheter  Outcome: Met     Problem: Bowel Elimination  Goal: Patient will participate in bowel management program  Outcome: Met     Problem: Neurogenic Bowel  Goal: Patient will perform adequate hygiene with incontinent episodes  Outcome: Met  Goal: Patient will verbalize signs and symptoms of constipation and how to prevent/alleviate  Outcome: Met  Goal: Patient will  demonstrates ability to complete digital stimulation technique  Outcome: Met     Problem: Skin Integrity  Goal: Patient's skin integrity will be maintained or improve  Outcome: Met     Problem: Nutrition  Goal: Patient's nutritional and fluid intake will be adequate or improve  Outcome: Met  Goal: Patient will display progressive weight gain toward goal have adequate food and fluid intake  Outcome: Met  Goal: Patient will demonstrate ability to administer respiratory medications  Outcome: Met     Problem: Self Care  Goal: Patient will have the ability to perform ADLs independently or with assistance  Outcome: Met     Problem: Mobility  Goal: Patient's capacity to carry out activities will improve  Outcome: Met     Problem: Infection  Goal: Patient will remain free from infection  Outcome: Met     Problem: VTE Prevention  Goal: Patient will remain free from venous thromboembolism (VTE)  Outcome: Met     Problem: Diabetes Management  Goal: Patient's ability to maintain appropriate glucose levels will be maintained or improve  Outcome: Met     Problem: Skin Integrity  Goal: Skin integrity is maintained or improved  Outcome: Met     Problem: Fall Risk - Rehab  Goal: Patient will remain free from falls  Outcome: Met     Problem: Pain - Standard  Goal: Alleviation of pain or a reduction in pain to the patient’s comfort goal  Outcome: Met   The patient is Stable - Low risk of patient condition declining or worsening    Shift Goals  Clinical Goals: Safety  Patient Goals: sleep

## 2024-02-19 NOTE — CARE PLAN
"The patient is Stable - Low risk of patient condition declining or worsening    Shift Goals  Clinical Goals: Safety  Patient Goals: Rest    Problem: Skin Integrity  Goal: Patient's skin integrity will be maintained or improve  Outcome: Progressing  Note:   Escobar Score: 16    Patient's skin remains intact and free from new or accidental injury this shift; no s/s of infection. RN wound protocol checked. Encouraged hydration and educated about the importance of nutrition to keep skin integrity. Will continue to monitor.       Problem: Fall Risk - Rehab  Goal: Patient will remain free from falls  Outcome: Progressing  Note: Amie Wright Fall risk Assessment Score: 16    High fall risk Interventions   - Alarming seatbelt  - Bed and strip alarm   - Yellow sign by the door   - Yellow wrist band \"Fall risk\"  - Room near to the nurse station  - Do not leave patient unattended in the bathroom  - Fall risk education provided     " Patient requests all Lab, Cardiology, and Radiology Results on their Discharge Instructions

## 2025-06-12 ENCOUNTER — APPOINTMENT (OUTPATIENT)
Dept: RADIOLOGY | Facility: MEDICAL CENTER | Age: 80
DRG: 560 | End: 2025-06-12
Attending: STUDENT IN AN ORGANIZED HEALTH CARE EDUCATION/TRAINING PROGRAM
Payer: MEDICARE

## 2025-06-12 ENCOUNTER — HOSPITAL ENCOUNTER (INPATIENT)
Facility: MEDICAL CENTER | Age: 80
LOS: 7 days | DRG: 560 | End: 2025-06-19
Attending: STUDENT IN AN ORGANIZED HEALTH CARE EDUCATION/TRAINING PROGRAM | Admitting: HOSPITALIST
Payer: MEDICARE

## 2025-06-12 ENCOUNTER — APPOINTMENT (OUTPATIENT)
Dept: RADIOLOGY | Facility: MEDICAL CENTER | Age: 80
DRG: 560 | End: 2025-06-12
Attending: ORTHOPAEDIC SURGERY
Payer: MEDICARE

## 2025-06-12 DIAGNOSIS — F90.9 ATTENTION DEFICIT HYPERACTIVITY DISORDER (ADHD), UNSPECIFIED ADHD TYPE: ICD-10-CM

## 2025-06-12 DIAGNOSIS — Z71.89 ACP (ADVANCE CARE PLANNING): ICD-10-CM

## 2025-06-12 DIAGNOSIS — N39.0 ACUTE UTI: ICD-10-CM

## 2025-06-12 DIAGNOSIS — M97.8XXA PERIPROSTHETIC FRACTURE OF SHAFT OF FEMUR: Primary | ICD-10-CM

## 2025-06-12 DIAGNOSIS — Z96.649 PERIPROSTHETIC FRACTURE OF SHAFT OF FEMUR: Primary | ICD-10-CM

## 2025-06-12 DIAGNOSIS — Z96.649 PERIPROSTHETIC FRACTURE AROUND INTERNAL PROSTHETIC HIP JOINT, INITIAL ENCOUNTER: ICD-10-CM

## 2025-06-12 DIAGNOSIS — M97.8XXA PERIPROSTHETIC FRACTURE AROUND INTERNAL PROSTHETIC HIP JOINT, INITIAL ENCOUNTER: ICD-10-CM

## 2025-06-12 DIAGNOSIS — S72.001A CLOSED RIGHT HIP FRACTURE, INITIAL ENCOUNTER (HCC): ICD-10-CM

## 2025-06-12 DIAGNOSIS — N32.0 BLADDER OUTLET OBSTRUCTION: ICD-10-CM

## 2025-06-12 LAB
ALBUMIN SERPL BCP-MCNC: 3.8 G/DL (ref 3.2–4.9)
ALBUMIN/GLOB SERPL: 1.2 G/DL
ALP SERPL-CCNC: 109 U/L (ref 30–99)
ALT SERPL-CCNC: 22 U/L (ref 2–50)
ANION GAP SERPL CALC-SCNC: 11 MMOL/L (ref 7–16)
ANISOCYTOSIS BLD QL SMEAR: ABNORMAL
APTT PPP: 26.5 SEC (ref 24.7–36)
AST SERPL-CCNC: 22 U/L (ref 12–45)
BASOPHILS # BLD AUTO: 0.9 % (ref 0–1.8)
BASOPHILS # BLD: 0.08 K/UL (ref 0–0.12)
BILIRUB SERPL-MCNC: 0.4 MG/DL (ref 0.1–1.5)
BUN SERPL-MCNC: 25 MG/DL (ref 8–22)
CALCIUM ALBUM COR SERPL-MCNC: 9.3 MG/DL (ref 8.5–10.5)
CALCIUM SERPL-MCNC: 9.1 MG/DL (ref 8.5–10.5)
CHLORIDE SERPL-SCNC: 102 MMOL/L (ref 96–112)
CO2 SERPL-SCNC: 24 MMOL/L (ref 20–33)
CREAT SERPL-MCNC: 0.5 MG/DL (ref 0.5–1.4)
EKG IMPRESSION: NORMAL
EOSINOPHIL # BLD AUTO: 0 K/UL (ref 0–0.51)
EOSINOPHIL NFR BLD: 0 % (ref 0–6.9)
ERYTHROCYTE [DISTWIDTH] IN BLOOD BY AUTOMATED COUNT: 42.5 FL (ref 35.9–50)
GFR SERPLBLD CREATININE-BSD FMLA CKD-EPI: 94 ML/MIN/1.73 M 2
GLOBULIN SER CALC-MCNC: 3.3 G/DL (ref 1.9–3.5)
GLUCOSE SERPL-MCNC: 122 MG/DL (ref 65–99)
HCT VFR BLD AUTO: 38.1 % (ref 37–47)
HGB BLD-MCNC: 12.9 G/DL (ref 12–16)
INR PPP: 0.98 (ref 0.87–1.13)
LYMPHOCYTES # BLD AUTO: 0.97 K/UL (ref 1–4.8)
LYMPHOCYTES NFR BLD: 10.3 % (ref 22–41)
MANUAL DIFF BLD: NORMAL
MCH RBC QN AUTO: 30.4 PG (ref 27–33)
MCHC RBC AUTO-ENTMCNC: 33.9 G/DL (ref 32.2–35.5)
MCV RBC AUTO: 89.9 FL (ref 81.4–97.8)
METAMYELOCYTES NFR BLD MANUAL: 0.9 %
MICROCYTES BLD QL SMEAR: ABNORMAL
MONOCYTES # BLD AUTO: 0.6 K/UL (ref 0–0.85)
MONOCYTES NFR BLD AUTO: 6 % (ref 0–13.4)
MORPHOLOGY BLD-IMP: NORMAL
NEUTROPHILS # BLD AUTO: 7.7 K/UL (ref 1.82–7.42)
NEUTROPHILS NFR BLD: 81.9 % (ref 44–72)
NRBC # BLD AUTO: 0 K/UL
NRBC BLD-RTO: 0 /100 WBC (ref 0–0.2)
PLATELET # BLD AUTO: 174 K/UL (ref 164–446)
PLATELET BLD QL SMEAR: NORMAL
PMV BLD AUTO: 9.2 FL (ref 9–12.9)
POTASSIUM SERPL-SCNC: 3.9 MMOL/L (ref 3.6–5.5)
PROT SERPL-MCNC: 7.1 G/DL (ref 6–8.2)
PROTHROMBIN TIME: 13 SEC (ref 12–14.6)
RBC # BLD AUTO: 4.24 M/UL (ref 4.2–5.4)
RBC BLD AUTO: PRESENT
SODIUM SERPL-SCNC: 137 MMOL/L (ref 135–145)
WBC # BLD AUTO: 9.4 K/UL (ref 4.8–10.8)

## 2025-06-12 PROCEDURE — 85007 BL SMEAR W/DIFF WBC COUNT: CPT

## 2025-06-12 PROCEDURE — 85027 COMPLETE CBC AUTOMATED: CPT

## 2025-06-12 PROCEDURE — 99285 EMERGENCY DEPT VISIT HI MDM: CPT

## 2025-06-12 PROCEDURE — 93005 ELECTROCARDIOGRAM TRACING: CPT | Mod: TC | Performed by: STUDENT IN AN ORGANIZED HEALTH CARE EDUCATION/TRAINING PROGRAM

## 2025-06-12 PROCEDURE — 770006 HCHG ROOM/CARE - MED/SURG/GYN SEMI*

## 2025-06-12 PROCEDURE — 80053 COMPREHEN METABOLIC PANEL: CPT

## 2025-06-12 PROCEDURE — 99223 1ST HOSP IP/OBS HIGH 75: CPT | Mod: AI | Performed by: HOSPITALIST

## 2025-06-12 PROCEDURE — 73700 CT LOWER EXTREMITY W/O DYE: CPT | Mod: RT

## 2025-06-12 PROCEDURE — 85730 THROMBOPLASTIN TIME PARTIAL: CPT

## 2025-06-12 PROCEDURE — A9270 NON-COVERED ITEM OR SERVICE: HCPCS | Performed by: HOSPITALIST

## 2025-06-12 PROCEDURE — 73552 X-RAY EXAM OF FEMUR 2/>: CPT | Mod: RT

## 2025-06-12 PROCEDURE — 85610 PROTHROMBIN TIME: CPT

## 2025-06-12 PROCEDURE — 700102 HCHG RX REV CODE 250 W/ 637 OVERRIDE(OP): Performed by: HOSPITALIST

## 2025-06-12 PROCEDURE — 36415 COLL VENOUS BLD VENIPUNCTURE: CPT

## 2025-06-12 PROCEDURE — 700111 HCHG RX REV CODE 636 W/ 250 OVERRIDE (IP): Mod: JZ | Performed by: HOSPITALIST

## 2025-06-12 RX ORDER — POLYETHYLENE GLYCOL 3350 17 G/17G
1 POWDER, FOR SOLUTION ORAL
Status: DISCONTINUED | OUTPATIENT
Start: 2025-06-12 | End: 2025-06-19

## 2025-06-12 RX ORDER — FERROUS SULFATE 325(65) MG
325 TABLET ORAL
Status: DISCONTINUED | OUTPATIENT
Start: 2025-06-12 | End: 2025-06-12

## 2025-06-12 RX ORDER — DEXTROAMPHETAMINE SACCHARATE, AMPHETAMINE ASPARTATE, DEXTROAMPHETAMINE SULFATE AND AMPHETAMINE SULFATE 3.75; 3.75; 3.75; 3.75 MG/1; MG/1; MG/1; MG/1
1 TABLET ORAL 2 TIMES DAILY
Refills: 0 | Status: DISCONTINUED | OUTPATIENT
Start: 2025-06-12 | End: 2025-06-12

## 2025-06-12 RX ORDER — OXYCODONE HYDROCHLORIDE 10 MG/1
10 TABLET ORAL
Refills: 0 | Status: DISCONTINUED | OUTPATIENT
Start: 2025-06-12 | End: 2025-06-19 | Stop reason: HOSPADM

## 2025-06-12 RX ORDER — ENOXAPARIN SODIUM 100 MG/ML
40 INJECTION SUBCUTANEOUS DAILY
Status: DISCONTINUED | OUTPATIENT
Start: 2025-06-12 | End: 2025-06-19 | Stop reason: HOSPADM

## 2025-06-12 RX ORDER — ASPIRIN 81 MG/1
81 TABLET, CHEWABLE ORAL 2 TIMES DAILY
Status: DISCONTINUED | OUTPATIENT
Start: 2025-06-12 | End: 2025-06-12

## 2025-06-12 RX ORDER — CALCIUM CARBONATE 500(1250)
500 TABLET ORAL
Status: DISCONTINUED | OUTPATIENT
Start: 2025-06-12 | End: 2025-06-19 | Stop reason: HOSPADM

## 2025-06-12 RX ORDER — ONDANSETRON 2 MG/ML
4 INJECTION INTRAMUSCULAR; INTRAVENOUS EVERY 4 HOURS PRN
Status: DISCONTINUED | OUTPATIENT
Start: 2025-06-12 | End: 2025-06-19 | Stop reason: HOSPADM

## 2025-06-12 RX ORDER — CHOLECALCIFEROL (VITAMIN D3) 50 MCG
1 TABLET ORAL DAILY
Status: DISCONTINUED | OUTPATIENT
Start: 2025-06-12 | End: 2025-06-12

## 2025-06-12 RX ORDER — AMOXICILLIN 250 MG
2 CAPSULE ORAL EVERY EVENING
Status: DISCONTINUED | OUTPATIENT
Start: 2025-06-12 | End: 2025-06-19

## 2025-06-12 RX ORDER — VITAMIN B COMPLEX
1000 TABLET ORAL DAILY
Status: DISCONTINUED | OUTPATIENT
Start: 2025-06-12 | End: 2025-06-19 | Stop reason: HOSPADM

## 2025-06-12 RX ORDER — OXYCODONE HYDROCHLORIDE 5 MG/1
5 TABLET ORAL
Refills: 0 | Status: DISCONTINUED | OUTPATIENT
Start: 2025-06-12 | End: 2025-06-19 | Stop reason: HOSPADM

## 2025-06-12 RX ORDER — DEXTROAMPHETAMINE SACCHARATE, AMPHETAMINE ASPARTATE, DEXTROAMPHETAMINE SULFATE AND AMPHETAMINE SULFATE 2.5; 2.5; 2.5; 2.5 MG/1; MG/1; MG/1; MG/1
15 TABLET ORAL DAILY
Refills: 0 | Status: DISCONTINUED | OUTPATIENT
Start: 2025-06-12 | End: 2025-06-19

## 2025-06-12 RX ORDER — HYDROMORPHONE HYDROCHLORIDE 1 MG/ML
0.5 INJECTION, SOLUTION INTRAMUSCULAR; INTRAVENOUS; SUBCUTANEOUS
Status: DISCONTINUED | OUTPATIENT
Start: 2025-06-12 | End: 2025-06-17

## 2025-06-12 RX ORDER — ONDANSETRON 4 MG/1
4 TABLET, ORALLY DISINTEGRATING ORAL EVERY 4 HOURS PRN
Status: DISCONTINUED | OUTPATIENT
Start: 2025-06-12 | End: 2025-06-19 | Stop reason: HOSPADM

## 2025-06-12 RX ORDER — HYDRALAZINE HYDROCHLORIDE 20 MG/ML
10 INJECTION INTRAMUSCULAR; INTRAVENOUS EVERY 4 HOURS PRN
Status: DISCONTINUED | OUTPATIENT
Start: 2025-06-12 | End: 2025-06-19 | Stop reason: HOSPADM

## 2025-06-12 RX ADMIN — DEXTROAMPHETAMINE SACCHARATE, AMPHETAMINE ASPARTATE, DEXTROAMPHETAMINE SULFATE AND AMPHETAMINE SULFATE 15 MG: 2.5; 2.5; 2.5; 2.5 TABLET ORAL at 09:40

## 2025-06-12 RX ADMIN — Medication 1000 UNITS: at 09:40

## 2025-06-12 RX ADMIN — Medication 500 MG: at 09:39

## 2025-06-12 RX ADMIN — OXYCODONE HYDROCHLORIDE 10 MG: 10 TABLET ORAL at 17:52

## 2025-06-12 RX ADMIN — ENOXAPARIN SODIUM 40 MG: 100 INJECTION SUBCUTANEOUS at 17:51

## 2025-06-12 ASSESSMENT — ENCOUNTER SYMPTOMS
HEADACHES: 0
SHORTNESS OF BREATH: 0
ABDOMINAL PAIN: 0
NAUSEA: 0
BACK PAIN: 1
NERVOUS/ANXIOUS: 0
FEVER: 0
FOCAL WEAKNESS: 0

## 2025-06-12 ASSESSMENT — FIBROSIS 4 INDEX: FIB4 SCORE: 1.65

## 2025-06-12 ASSESSMENT — PAIN DESCRIPTION - PAIN TYPE
TYPE: ACUTE PAIN
TYPE: ACUTE PAIN

## 2025-06-12 NOTE — H&P
Hospital Medicine History & Physical Note    Date of Service  6/12/2025    Primary Care Physician  Pcp Pt States None    Consultants  orthopedics    Specialist Names: Dr. Yvon Hidalgo     Code Status  Full Code    Chief Complaint  Chief Complaint   Patient presents with    Fall     Tripped and fell at home    Leg Pain     Right upper leg pain, swelling noted, non ambulatory, -rotation/shortening       History of Presenting Illness  Barbra العراقي is a 80 y.o. female prior orthopedic hip surgeries with a prior right proximal femur periprosthetic fracture with prior open reduction internal fixation on 8/29/2023.  She presented 6/12/2025 with ground-level fall and imaging concerning for ongoing or new periprosthetic fracture of the right femur.  Patient currently is alert although awoken from sleep and slightly drowsy.  She was alerted that she will be admitted for further evaluation by orthopedics.  I discussed with Dr. Hidalgo he will review further the patient's right hip.  Surgery versus physical therapy with weightbearing as tolerates based on his consult.  Patient's pain is currently controlled she is alert with clear speech.  Unclear why she fell but she states she did trip.    I discussed the plan of care with patient.    Review of Systems  Review of Systems   Constitutional:  Negative for fever and malaise/fatigue.   Respiratory:  Negative for shortness of breath.    Cardiovascular:  Negative for leg swelling.   Gastrointestinal:  Negative for abdominal pain and nausea.   Musculoskeletal:  Positive for back pain and joint pain.   Neurological:  Negative for focal weakness and headaches.   Psychiatric/Behavioral:  The patient is not nervous/anxious.        Past Medical History   has no past medical history on file.    Surgical History   has a past surgical history that includes orif, fracture, femur (Right, 8/29/2023).     Family History  No family history on file.     Family history reviewed with patient. There  is no family history that is pertinent to the chief complaint.     Social History   reports that she has never smoked. She has never used smokeless tobacco.    Allergies  Allergies[1]    Medications  Prior to Admission Medications   Prescriptions Last Dose Informant Patient Reported? Taking?   Calcium Carbonate (CALCIUM 600 PO)  Patient Yes No   Sig: Take 1 Tablet by mouth every day.   Cholecalciferol (D3 PO)  Patient Yes No   Sig: Take 1 Tablet by mouth every day.   Cyanocobalamin (B-12 PO)  Patient Yes No   Sig: Take 1 Tablet by mouth every day.   Multiple Vitamins-Minerals (ZINC PO)  Patient Yes No   Sig: Take 1 Tablet by mouth every day.   Zinc 50 MG Tab  Patient Yes No   Sig: Take 50 mg by mouth every day.   acetaminophen (TYLENOL) 500 MG Tab   No No   Sig: Take 2 Tablets by mouth every 6 hours.   amphetamine-dextroamphetamine (ADDERALL) 15 MG tablet   Yes No   Sig: Take 1 Tablet by mouth 2 times a day.   aspirin (ASA) 81 MG Chew Tab chewable tablet   No No   Sig: Chew 1 Tablet 2 times a day.   ferrous sulfate 325 (65 Fe) MG tablet   No No   Sig: Take 1 Tablet by mouth every morning with breakfast.   melatonin 5 mg Tab   No No   Sig: Take 1 Tablet by mouth every evening.      Facility-Administered Medications: None       Physical Exam  Temp:  [36.1 °C (97 °F)] 36.1 °C (97 °F)  Pulse:  [74-93] 93  Resp:  [16-20] 20  BP: (153-186)/(67-85) 153/68  SpO2:  [96 %-98 %] 96 %  Blood Pressure : (!) 153/68   Temperature: 36.1 °C (97 °F)   Pulse: 93   Respiration: 20   Pulse Oximetry: 96 %       Physical Exam  Vitals reviewed.   Constitutional:       Appearance: She is not ill-appearing.   HENT:      Head: Normocephalic.      Mouth/Throat:      Mouth: Mucous membranes are moist.   Eyes:      General:         Right eye: No discharge.         Left eye: No discharge.      Conjunctiva/sclera: Conjunctivae normal.   Cardiovascular:      Rate and Rhythm: Normal rate and regular rhythm.      Heart sounds: No murmur  "heard.  Pulmonary:      Effort: No respiratory distress.      Breath sounds: Normal breath sounds.   Abdominal:      General: Bowel sounds are normal. There is no distension.   Musculoskeletal:      Cervical back: Neck supple.      Right lower leg: No edema.      Left lower leg: No edema.   Lymphadenopathy:      Cervical: No cervical adenopathy.   Skin:     General: Skin is dry.   Neurological:      General: No focal deficit present.      Mental Status: She is alert and oriented to person, place, and time.      Cranial Nerves: No cranial nerve deficit.   Psychiatric:         Mood and Affect: Mood normal.         Laboratory:  Recent Labs     06/12/25  0526   WBC 9.4   RBC 4.24   HEMOGLOBIN 12.9   HEMATOCRIT 38.1   MCV 89.9   MCH 30.4   MCHC 33.9   RDW 42.5   PLATELETCT 174   MPV 9.2     Recent Labs     06/12/25  0526   SODIUM 137   POTASSIUM 3.9   CHLORIDE 102   CO2 24   GLUCOSE 122*   BUN 25*   CREATININE 0.50   CALCIUM 9.1     Recent Labs     06/12/25  0526   ALTSGPT 22   ASTSGOT 22   ALKPHOSPHAT 109*   TBILIRUBIN 0.4   GLUCOSE 122*     Recent Labs     06/12/25  0526   APTT 26.5   INR 0.98     No results for input(s): \"NTPROBNP\" in the last 72 hours.      No results for input(s): \"TROPONINT\" in the last 72 hours.    Imaging:  CT-HIP W/O PLUS RECONS RIGHT   Final Result         1.  Possible subtle acute fracture of the anterior superior aspect of the right greater trochanter, limited evaluation due to metallic streak and scatter artifacts.   2.  Marked bladder distention, consider bladder outlet obstruction.   3.  Diverticulosis.         DX-FEMUR-2+ RIGHT   Final Result         1.  No radiographic evidence of acute traumatic injury.              Assessment/Plan:  Justification for Admission Status  I anticipate this patient will require at least two midnights for appropriate medical management, necessitating inpatient admission because right periprosthetic femur fracture.    Patient will need a Med/Surg bed on " MEDICAL service .  The need is secondary to potential need of surgical repair or potential placement based on orthopedics evaluation..    * Periprosthetic fracture around internal prosthetic hip joint, initial encounter- (present on admission)  Assessment & Plan  Pain control  Calcium vitamin D PT/OT  Based on Ortho's consult  Possible surgical repair based on Ortho's consult  DVT prophylaxis        VTE prophylaxis: SCDs/TEDs         [1]   Allergies  Allergen Reactions    Ampicillin Unspecified     Hazy, bloated    Keflex [Cephalexin] Unspecified     Drowsy, altered

## 2025-06-12 NOTE — ED NOTES
Bedside report from JOB Denney. Pt resting in [facility] comfortably, on monitor, call light in reach.  Pt is on RA, GCS 15.  Necessary fall precautions in place.

## 2025-06-12 NOTE — CONSULTS
DATE OF SERVICE:  06/12/2025     ORTHOPEDIC CONSULTATION     REQUESTING PHYSICIAN:  Yon Orozco MD, Emergency Department.     REASON FOR CONSULTATION:  Right hip pain after fall, concerned for fracture.     CHIEF COMPLAINT:  Right lower extremity pain.     HISTORY OF PRESENT ILLNESS:  The patient is 80 years old.  She had a fall.    She has been complaining of pain in the right lower extremity and inability to   bear weight.  She has a history of periprosthetic femur fracture treated by   Dr. Galvan in 2023 on that side.  She is complaining of pain basically in her   entire right lower extremity.     PAST MEDICAL HISTORY:  ALLERGIES:  AMPICILLIN, KEFLEX.     OUTPATIENT MEDICATIONS:  Tylenol, melatonin, Adderall, vitamin B12, calcium   zinc, cholecalciferol.     PAST MEDICAL DIAGNOSES:  ADHD, likely osteoporosis.     PAST SURGICAL HISTORY:  Bilateral hip arthroplasty, surgical fixation of   bilateral periprosthetic femur fractures, last of which was in 2023 on the   right side by Dr. Galvan.     SOCIAL HISTORY:  The patient denies smoking, alcohol, illicit drug use.     PHYSICAL EXAMINATION:  VITAL SIGNS:  Temperature is 97, heart rate 68, respiratory rate 12, blood   pressure 148/64, pulse oximetry 94% on room air.  GENERAL APPEARANCE:  The patient is alert, pleasant, cooperative, in no acute   distress.  MUSCULOSKELETAL:  Examination of the right lower extremity, she does have some   erythema in the leg and some pedal edema and tenderness to palpation there   consistent with chronic venous stasis changes without open wound.  She is able   to dorsi and plantarflex the foot and flex and extend the toes.  The foot is   warm and appears well perfused.  She has no significant pain with axial   loading to the right lower extremity.  I can passively flex her hip and her   knee slightly and this does not hurt much.  She has no pain with log roll.    She is tender to palpation from the thigh all the way to the  mid-thigh.  She   is nontender over the greater trochanter.     DIAGNOSTIC IMAGING:  Plain x-rays of the right femur show stable-appearing   periprosthetic femur fracture compared to intraoperative fluoroscopy from 2023   without evidence of obvious acute fracture.  CT of the pelvis shows no   evidence of acute posterior anterior pelvic ring disruption.  There is no   definitive acute fracture in the proximal femur that I can detect.     ASSESSMENT:  An 80-year-old female with ground level fall and pain to the   right lower extremity.  CT imaging basically rules out at least displaced   pelvic ring fracture and significant proximal femur fracture.  She does have   plate and screw fixation, which appears stable from the greater trochanter to   the distal third of the femur spanning her femoral prosthesis of her total hip   arthroplasty.  She does have diffuse pain in the lower extremity.  I cannot   completely rule out an occult fracture around the knee or the distal portion   of her plate.  She does have some pain in the soft tissues of the leg, likely   as a result of some chronic venous stasis changes or possibly cellulitis.     RECOMMENDATIONS:  1.  I discussed these findings with the patient and I fell just to be   conservative ordering a CT of the knee to rule out fracture about the knee or   at the tip of the lateral femoral plate and screws would be beneficial to help   guide definitive weightbearing recommendations.  2.  If the CT does not show an obvious fracture, I feel that she could weight   bear as tolerated to the right lower extremity.  3.  She should be toe-touch weightbearing in the right lower extremity in the   meantime.        ______________________________  MD RENE Suresh/SERG    DD:  06/12/2025 08:27  DT:  06/12/2025 09:20    Job#:  [id number]

## 2025-06-12 NOTE — ED NOTES
Pharmacy Medication Reconciliation      ~Medication reconciliation updated and complete per patient at bedside   ~Allergies have been verified and updated   ~No oral ABX within the last 30 days  ~Is dispense history available in EPIC: yes   ~Patient home pharmacy :  [facility]       ~Anticoagulants (rivaroxaban, apixaban, edoxaban, dabigatran, warfarin, enoxaparin) taken in the last 14 days? No

## 2025-06-12 NOTE — ED TRIAGE NOTES
"Chief Complaint   Patient presents with    Fall     Tripped and fell at home    Leg Pain     Right upper leg pain, swelling noted, non ambulatory, -rotation/shortening     Patient BIB [facility] from home for the above complaint. Patient states that she was walking around a tight corner when she tipped and fell but she is unsure of what she tripped on. Patient denies head strike and thinners, only complaint is right upper leg pain and swelling.    Patient has a history of an ORIF for her right femur in 2023.    Imaging ordered per protocol.    Patient took 400 mg Ibuprofen before EMS arrived, no other interventions PTA.    BP (!) 186/85   Pulse 77   Temp 36.1 °C (97 °F) (Temporal)   Resp 16   Ht 1.651 m (5' 5\")   Wt 61.2 kg (135 lb)   SpO2 97%  - RA  "

## 2025-06-12 NOTE — ED NOTES
Pt soiled the bed. Ed tech and RN at bedside to assist with helping the patient clean themselves up, along with providing new bed linens and pt gown.   Pt states earlier that they would like to walk. After rolling pt in bed to provide bed change, pt and RN decided that the pt is in too much pain to ambulate safely.

## 2025-06-12 NOTE — ASSESSMENT & PLAN NOTE
6/17/2025  Orthopedist Dr. Hidalgo evaluated and recommended nonoperative management with weight bearing as tolerated.  PT OT recommended SNF.   [facility]  Palliative care discussed with the patient regarding goal of care.

## 2025-06-13 PROBLEM — N32.0 BLADDER OUTLET OBSTRUCTION: Status: ACTIVE | Noted: 2025-06-13

## 2025-06-13 PROBLEM — Z71.89 ACP (ADVANCE CARE PLANNING): Status: ACTIVE | Noted: 2025-06-13

## 2025-06-13 LAB
ANION GAP SERPL CALC-SCNC: 9 MMOL/L (ref 7–16)
BUN SERPL-MCNC: 19 MG/DL (ref 8–22)
CALCIUM SERPL-MCNC: 8.7 MG/DL (ref 8.5–10.5)
CHLORIDE SERPL-SCNC: 103 MMOL/L (ref 96–112)
CO2 SERPL-SCNC: 24 MMOL/L (ref 20–33)
CREAT SERPL-MCNC: 0.57 MG/DL (ref 0.5–1.4)
ERYTHROCYTE [DISTWIDTH] IN BLOOD BY AUTOMATED COUNT: 44.3 FL (ref 35.9–50)
GFR SERPLBLD CREATININE-BSD FMLA CKD-EPI: 91 ML/MIN/1.73 M 2
GLUCOSE SERPL-MCNC: 105 MG/DL (ref 65–99)
HCT VFR BLD AUTO: 38.2 % (ref 37–47)
HGB BLD-MCNC: 12.1 G/DL (ref 12–16)
MCH RBC QN AUTO: 29.3 PG (ref 27–33)
MCHC RBC AUTO-ENTMCNC: 31.7 G/DL (ref 32.2–35.5)
MCV RBC AUTO: 92.5 FL (ref 81.4–97.8)
PLATELET # BLD AUTO: 189 K/UL (ref 164–446)
PMV BLD AUTO: 9.6 FL (ref 9–12.9)
POTASSIUM SERPL-SCNC: 3.9 MMOL/L (ref 3.6–5.5)
RBC # BLD AUTO: 4.13 M/UL (ref 4.2–5.4)
SODIUM SERPL-SCNC: 136 MMOL/L (ref 135–145)
WBC # BLD AUTO: 8.5 K/UL (ref 4.8–10.8)

## 2025-06-13 PROCEDURE — 85027 COMPLETE CBC AUTOMATED: CPT

## 2025-06-13 PROCEDURE — 700102 HCHG RX REV CODE 250 W/ 637 OVERRIDE(OP): Performed by: HOSPITALIST

## 2025-06-13 PROCEDURE — 99233 SBSQ HOSP IP/OBS HIGH 50: CPT | Mod: 25 | Performed by: STUDENT IN AN ORGANIZED HEALTH CARE EDUCATION/TRAINING PROGRAM

## 2025-06-13 PROCEDURE — 99497 ADVNCD CARE PLAN 30 MIN: CPT | Performed by: NURSE PRACTITIONER

## 2025-06-13 PROCEDURE — 97166 OT EVAL MOD COMPLEX 45 MIN: CPT

## 2025-06-13 PROCEDURE — 99497 ADVNCD CARE PLAN 30 MIN: CPT | Performed by: STUDENT IN AN ORGANIZED HEALTH CARE EDUCATION/TRAINING PROGRAM

## 2025-06-13 PROCEDURE — 97535 SELF CARE MNGMENT TRAINING: CPT

## 2025-06-13 PROCEDURE — 80048 BASIC METABOLIC PNL TOTAL CA: CPT

## 2025-06-13 PROCEDURE — 97163 PT EVAL HIGH COMPLEX 45 MIN: CPT

## 2025-06-13 PROCEDURE — 700111 HCHG RX REV CODE 636 W/ 250 OVERRIDE (IP): Mod: JZ | Performed by: HOSPITALIST

## 2025-06-13 PROCEDURE — 770006 HCHG ROOM/CARE - MED/SURG/GYN SEMI*

## 2025-06-13 PROCEDURE — 700102 HCHG RX REV CODE 250 W/ 637 OVERRIDE(OP): Performed by: STUDENT IN AN ORGANIZED HEALTH CARE EDUCATION/TRAINING PROGRAM

## 2025-06-13 PROCEDURE — 99223 1ST HOSP IP/OBS HIGH 75: CPT | Mod: 25 | Performed by: NURSE PRACTITIONER

## 2025-06-13 PROCEDURE — 36415 COLL VENOUS BLD VENIPUNCTURE: CPT

## 2025-06-13 PROCEDURE — A9270 NON-COVERED ITEM OR SERVICE: HCPCS | Performed by: STUDENT IN AN ORGANIZED HEALTH CARE EDUCATION/TRAINING PROGRAM

## 2025-06-13 PROCEDURE — 51798 US URINE CAPACITY MEASURE: CPT

## 2025-06-13 PROCEDURE — A9270 NON-COVERED ITEM OR SERVICE: HCPCS | Performed by: HOSPITALIST

## 2025-06-13 RX ORDER — TAMSULOSIN HYDROCHLORIDE 0.4 MG/1
0.4 CAPSULE ORAL
Status: DISCONTINUED | OUTPATIENT
Start: 2025-06-13 | End: 2025-06-19 | Stop reason: HOSPADM

## 2025-06-13 RX ADMIN — ENOXAPARIN SODIUM 40 MG: 100 INJECTION SUBCUTANEOUS at 17:26

## 2025-06-13 RX ADMIN — Medication 5 MG: at 20:05

## 2025-06-13 RX ADMIN — OXYCODONE HYDROCHLORIDE 10 MG: 10 TABLET ORAL at 19:48

## 2025-06-13 RX ADMIN — Medication 500 MG: at 08:28

## 2025-06-13 RX ADMIN — TAMSULOSIN HYDROCHLORIDE 0.4 MG: 0.4 CAPSULE ORAL at 13:14

## 2025-06-13 RX ADMIN — Medication 1000 UNITS: at 06:27

## 2025-06-13 RX ADMIN — DEXTROAMPHETAMINE SACCHARATE, AMPHETAMINE ASPARTATE, DEXTROAMPHETAMINE SULFATE AND AMPHETAMINE SULFATE 15 MG: 2.5; 2.5; 2.5; 2.5 TABLET ORAL at 06:27

## 2025-06-13 RX ADMIN — OXYCODONE HYDROCHLORIDE 10 MG: 10 TABLET ORAL at 11:42

## 2025-06-13 ASSESSMENT — LIFESTYLE VARIABLES
ON A TYPICAL DAY WHEN YOU DRINK ALCOHOL HOW MANY DRINKS DO YOU HAVE: 0
DOES PATIENT WANT TO STOP DRINKING: CANNOT ASSESS
TOTAL SCORE: 0
EVER HAD A DRINK FIRST THING IN THE MORNING TO STEADY YOUR NERVES TO GET RID OF A HANGOVER: NO
AVERAGE NUMBER OF DAYS PER WEEK YOU HAVE A DRINK CONTAINING ALCOHOL: 0
TOTAL SCORE: 0
HAVE PEOPLE ANNOYED YOU BY CRITICIZING YOUR DRINKING: NO
TOTAL SCORE: 0
CONSUMPTION TOTAL: NEGATIVE
ALCOHOL_USE: NO
EVER FELT BAD OR GUILTY ABOUT YOUR DRINKING: NO
HOW MANY TIMES IN THE PAST YEAR HAVE YOU HAD 5 OR MORE DRINKS IN A DAY: 0
HAVE YOU EVER FELT YOU SHOULD CUT DOWN ON YOUR DRINKING: NO

## 2025-06-13 ASSESSMENT — COGNITIVE AND FUNCTIONAL STATUS - GENERAL
SUGGESTED CMS G CODE MODIFIER DAILY ACTIVITY: CK
MOVING FROM LYING ON BACK TO SITTING ON SIDE OF FLAT BED: A LITTLE
DAILY ACTIVITIY SCORE: 16
DRESSING REGULAR UPPER BODY CLOTHING: A LITTLE
SUGGESTED CMS G CODE MODIFIER MOBILITY: CL
MOVING FROM LYING ON BACK TO SITTING ON SIDE OF FLAT BED: A LOT
WALKING IN HOSPITAL ROOM: TOTAL
HELP NEEDED FOR BATHING: A LOT
WALKING IN HOSPITAL ROOM: A LOT
MOVING TO AND FROM BED TO CHAIR: A LOT
MOBILITY SCORE: 10
TOILETING: A LOT
TOILETING: A LITTLE
SUGGESTED CMS G CODE MODIFIER DAILY ACTIVITY: CK
PERSONAL GROOMING: A LITTLE
STANDING UP FROM CHAIR USING ARMS: A LOT
HELP NEEDED FOR BATHING: A LITTLE
DRESSING REGULAR UPPER BODY CLOTHING: A LITTLE
DAILY ACTIVITIY SCORE: 19
DRESSING REGULAR LOWER BODY CLOTHING: A LOT
CLIMB 3 TO 5 STEPS WITH RAILING: A LOT
TURNING FROM BACK TO SIDE WHILE IN FLAT BAD: A LOT
STANDING UP FROM CHAIR USING ARMS: A LOT
SUGGESTED CMS G CODE MODIFIER MOBILITY: CK
MOVING TO AND FROM BED TO CHAIR: A LITTLE
MOBILITY SCORE: 16
DRESSING REGULAR LOWER BODY CLOTHING: A LOT
CLIMB 3 TO 5 STEPS WITH RAILING: TOTAL

## 2025-06-13 ASSESSMENT — PAIN DESCRIPTION - PAIN TYPE
TYPE: ACUTE PAIN

## 2025-06-13 ASSESSMENT — SOCIAL DETERMINANTS OF HEALTH (SDOH)
IN THE PAST 12 MONTHS, HAS THE ELECTRIC, GAS, OIL, OR WATER COMPANY THREATENED TO SHUT OFF SERVICE IN YOUR HOME?: NO
WITHIN THE PAST 12 MONTHS, YOU WORRIED THAT YOUR FOOD WOULD RUN OUT BEFORE YOU GOT THE MONEY TO BUY MORE: NEVER TRUE
WITHIN THE PAST 12 MONTHS, THE FOOD YOU BOUGHT JUST DIDN'T LAST AND YOU DIDN'T HAVE MONEY TO GET MORE: NEVER TRUE

## 2025-06-13 ASSESSMENT — ENCOUNTER SYMPTOMS
FALLS: 1
BACK PAIN: 1
SHORTNESS OF BREATH: 0

## 2025-06-13 ASSESSMENT — PATIENT HEALTH QUESTIONNAIRE - PHQ9
2. FEELING DOWN, DEPRESSED, IRRITABLE, OR HOPELESS: NOT AT ALL
1. LITTLE INTEREST OR PLEASURE IN DOING THINGS: NOT AT ALL
1. LITTLE INTEREST OR PLEASURE IN DOING THINGS: NOT AT ALL
2. FEELING DOWN, DEPRESSED, IRRITABLE, OR HOPELESS: NOT AT ALL
SUM OF ALL RESPONSES TO PHQ9 QUESTIONS 1 AND 2: 0
SUM OF ALL RESPONSES TO PHQ9 QUESTIONS 1 AND 2: 0

## 2025-06-13 ASSESSMENT — FIBROSIS 4 INDEX: FIB4 SCORE: 2.03

## 2025-06-13 ASSESSMENT — ACTIVITIES OF DAILY LIVING (ADL): TOILETING: INDEPENDENT

## 2025-06-13 ASSESSMENT — GAIT ASSESSMENTS: GAIT LEVEL OF ASSIST: UNABLE TO PARTICIPATE

## 2025-06-13 NOTE — PROGRESS NOTES
Hospital Medicine Daily Progress Note    Date of Service  6/13/2025    Chief Complaint  Barbra العراقي is a 80 y.o. female admitted 6/12/2025 with right hip pain     Hospital Course    80 y.o. female prior orthopedic hip surgeries with a prior right proximal femur periprosthetic fracture with prior open reduction internal fixation on 8/29/2023 presented with right hip pain after she tripped and fallen.  Subsequent CT hip noted with subtle acute fracture of anterior superior aspect of right greater trochanter..  CT knee with no evidence of acute fracture.  Orthopedist Dr. Hidalgo evaluated and recommended nonoperative management with weight bearing as tolerated.  Arellano was placed for retention.  PT/OT evaluation pending    Interval Problem Update    6/13/2025  Seen and examined at bedside  Vitals remained stable  Pain well-managed  Labs with normal white count, hemoglobin 12.1, chemistry sodium 133 potassium 3.9, renal function remained stable  Imaging reviewed CT hip noted with subtle acute fracture of the anterior superior aspect of the right greater trochanter.  Marked bladder distention  CT right knee with no fracture  Chart reviewed, meds reviewed, consultant reviewed.  Plan  Arellano catheter placed for urine retention  Added Flomax  Renal ultrasound ordered  Repeat BMP in a.m. to monitor renal function, electrolytes  On IV narcotics for pain management  Monitor for respiratory toxicity while on IV narcotics  PT/OT evaluation  Case discussed with orthopedics Dr Rosa.  CTAP results reviewed.  Plan for nonoperative management, weightbearing as tolerated    I have discussed this patient's plan of care and discharge plan at IDT rounds today with Case Management, Nursing, Nursing leadership, and other members of the IDT team.    Consultants/Specialty  orthopedics    Code Status  Full Code    Disposition  The patient is not medically cleared for discharge to home or a post-acute facility.  Anticipate discharge to: home  with close outpatient follow-up    I have placed the appropriate orders for post-discharge needs.    Review of Systems  Review of Systems   Constitutional:  Positive for malaise/fatigue.   Musculoskeletal:  Positive for joint pain.        Physical Exam  Temp:  [35.9 °C (96.6 °F)-36.3 °C (97.4 °F)] 36.3 °C (97.4 °F)  Pulse:  [66-89] 69  Resp:  [12-18] 16  BP: (124-152)/(58-77) 130/62  SpO2:  [93 %-95 %] 94 %    Physical Exam  Constitutional:       Appearance: Normal appearance.   Cardiovascular:      Rate and Rhythm: Normal rate and regular rhythm.      Pulses: Normal pulses.      Heart sounds: Normal heart sounds.   Pulmonary:      Effort: Pulmonary effort is normal.      Breath sounds: Normal breath sounds.   Musculoskeletal:      Comments: Range of motion limited on left hip due to pain   Neurological:      Mental Status: She is alert.         Fluids    Intake/Output Summary (Last 24 hours) at 6/13/2025 1315  Last data filed at 6/13/2025 1200  Gross per 24 hour   Intake --   Output 1400 ml   Net -1400 ml        Laboratory  Recent Labs     06/12/25  0526 06/13/25  0043   WBC 9.4 8.5   RBC 4.24 4.13*   HEMOGLOBIN 12.9 12.1   HEMATOCRIT 38.1 38.2   MCV 89.9 92.5   MCH 30.4 29.3   MCHC 33.9 31.7*   RDW 42.5 44.3   PLATELETCT 174 189   MPV 9.2 9.6     Recent Labs     06/12/25  0526 06/13/25  0043   SODIUM 137 136   POTASSIUM 3.9 3.9   CHLORIDE 102 103   CO2 24 24   GLUCOSE 122* 105*   BUN 25* 19   CREATININE 0.50 0.57   CALCIUM 9.1 8.7     Recent Labs     06/12/25  0526   APTT 26.5   INR 0.98               Imaging  CT-KNEE W/O PLUS RECONS RIGHT   Final Result      1.  Suboptimal exam related to osteopenia.   2.  No evidence of acute fracture.   3.  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   4.  Mild subcutaneous edema.      CT-HIP W/O PLUS RECONS RIGHT   Final Result         1.  Possible subtle acute fracture of the anterior superior aspect of the right greater trochanter, limited evaluation due to metallic  streak and scatter artifacts.   2.  Marked bladder distention, consider bladder outlet obstruction.   3.  Diverticulosis.         DX-FEMUR-2+ RIGHT   Final Result         1.  No radiographic evidence of acute traumatic injury.      US-RENAL    (Results Pending)        Assessment/Plan  * Periprosthetic fracture around internal prosthetic hip joint, initial encounter- (present on admission)  Assessment & Plan  Arellano catheter placed for urine retention  Added Flomax  Renal ultrasound ordered  Repeat BMP in a.m. to monitor renal function, electrolytes  On IV narcotics for pain management  Monitor for respiratory toxicity while on IV narcotics  PT/OT evaluation  Case discussed with orthopedics Dr Rosa.  CTAP results reviewed.  Plan for nonoperative management, weightbearing as tolerated    Bladder outlet obstruction  Assessment & Plan  Marked bladder distention on CT  Bladder scan noted with 1400 mL urine retention  Arellano catheter placed, added Flomax  Renal ultrasound was ordered for further evaluation    ACP (advance care planning)  Assessment & Plan  80 female with right  subtle acute fracture of anterior superior aspect of right greater trochanter.  I had a prolonged discussion with patient regarding goal of care, diagnosis, prognosis and CODE STATUS.  We discussed her prognosis and comorbidities.  I spent 17  minutes advance care planning.  Discussed full resuscitation to include chest compression, medication, defibrillation and intubation.  Patient stated to remain full code for now and would like to think, she also requested to talk with the palliative for further goal of care discussion.  Palliative care has been consulted.                 VTE prophylaxis: lovenox    I have performed a physical exam and reviewed and updated ROS and Plan today (6/13/2025). In review of yesterday's note (6/12/2025), there are no changes except as documented above.

## 2025-06-13 NOTE — THERAPY
"Physical Therapy   Initial Evaluation     Patient Name:  Barbra العراقي  Age:  80 y.o., Sex:  female  Medical Record #:  [id number]  Today's Date: 6/13/2025     Precautions  Medical: (P) Fall Risk  Weight Bearing: (P) Weight Bearing as Tolerated Right Lower Extremity    Assessment  Patient is 80 y.o. female admitted with complaints of RLE pain after a GLF. CT hip noted with subtle acute fracture of anterior superior aspect of right greater trochanter. Currently managed non-operatively and WBAT.  PMH includes depression, B SKIP, previous R hip fx with ORIF.     Patient received in bed and agreeable to PT session. Pt required grossly modA for bed mobility, STS, and side step along EOB. Pt is primarily limited by pain, generalized weakness, impaired balance, and limited activity tolerance. Recommend post acute placement. Will follow for acute care PT needs.     Plan    Physical Therapy Initial Treatment Plan   Treatment Plan : (P) Bed Mobility, Gait Training, Neuro Re-Education / Balance, Self Care / Home Evaluation, Therapeutic Activities, Therapeutic Exercise  Treatment Frequency: (P) 4 Times per Week  Duration: (P) Until Therapy Goals Met    DC Equipment Recommendations: (P) Unable to determine at this time  Discharge Recommendations: (P) Recommend post-acute placement for additional physical therapy services prior to discharge home       Subjective    \"I normally do pretty good on my own\".      Objective       06/13/25 7066   Initial Contact Note    Initial Contact Note Order Received and Verified, Physical Therapy Evaluation in Progress with Full Report to Follow.   Precautions   Medical Fall Risk   Weight Bearing Weight Bearing as Tolerated Right Lower Extremity   Vitals   O2 (LPM) 0   O2 Delivery Device None - Room Air   Vitals Comments VSS, declines dizziness   Pain 0 - 10 Group   Location Back;Hip;Leg   Location Orientation Right   Therapist Pain Assessment Post Activity Pain Same as Prior to Activity;Nurse " "Notified  (pain not rated)   Prior Living Situation   Prior Services Home-Independent   Housing / Facility 2 Story Apartment / Condo  (lives on 2nd floor)   Elevator Yes   Equipment Owned 4-Wheel Walker;Single Point Cane   Lives with - Patient's Self Care Capacity Alone and Able to Care For Self   Comments Reports she has a daughter that lives local but can be unreliable, has some local friends   Prior Level of Functional Mobility   Bed Mobility Independent   Transfer Status Independent   Ambulation Independent   Ambulation Distance community   Assistive Devices Used 4-Wheel Walker   Comments Pt reports using 4WW in home and SPC in community, reports the 4WW is \"too difficult\" to deal with in the community   History of Falls   History of Falls Yes   Date of Last Fall   (reason for admit, reports 1 other fall within the last year)   Cognition    Cognition / Consciousness X   Level of Consciousness Alert   Safety Awareness Impaired   New Learning Impaired   Attention Impaired   Comments pleasant and participatory, questionable historian   Active ROM Lower Body    Active ROM Lower Body  X   Comments RLE limited 2/2 pain and weakness, LLE appears WFL   Strength Lower Body   Lower Body Strength  X   Comments RLE grossly 2/5, LLE grossly 4/5   Other Treatments   Other Treatments Provided Discussed ankle pumps, heel slides, glute iso, and LAQ's   Balance Assessment   Sitting Balance (Static) Fair   Sitting Balance (Dynamic) Fair -   Standing Balance (Static) Poor +   Standing Balance (Dynamic) Poor   Weight Shift Sitting Poor   Weight Shift Standing Poor   Comments w/FWW   Bed Mobility    Supine to Sit Moderate Assist   Sit to Supine Maximal Assist   Scooting Minimal Assist   Comments HOB slightly elevated   Gait Analysis   Gait Level Of Assist Unable to Participate   Functional Mobility   Sit to Stand Moderate Assist   Bed, Chair, Wheelchair Transfer Unable to Participate   Mobility supine > EOB > STS > side step > supine "   6 Clicks Assessment - How much HELP from from another person do you currently need... (If the patient hasn't done an activity recently, how much help from another person do you think he/she would need if he/she tried?)   Turning from your back to your side while in a flat bed without using bedrails? 2   Moving from lying on your back to sitting on the side of a flat bed without using bedrails? 2   Moving to and from a bed to a chair (including a wheelchair)? 2   Standing up from a chair using your arms (e.g., wheelchair, or bedside chair)? 2   Walking in hospital room? 1   Climbing 3-5 steps with a railing? 1   6 clicks Mobility Score 10   Short Term Goals    Short Term Goal # 1 Pt will be able to perform supine <> sit with SPV in 6 visits to progress bed mobility   Short Term Goal # 2 Pt will be able to perform STS with FWW and SPV in 6 visits to progress functional transfers   Short Term Goal # 3 Pt will be able to perform stand step transfers with FWW and SPV in 6 visits to progress functional mobility   Short Term Goal # 4 Pt will be able to ambulate 50ft with FWW and Diomedes in 6 visits to progress functional gait   Education Group   Education Provided Role of Physical Therapist;Use of Assistive Device;Exercises - Supine;Exercises - Seated;Weight Bearing Status   Role of Physical Therapist Patient Response Patient;Acceptance;Explanation;Verbal Demonstration   Use of Assistive Device Patient Response Patient;Acceptance;Explanation;Action Demonstration   Exercises - Supine Patient Response Patient;Acceptance;Explanation;Verbal Demonstration   Exercises - Seated Patient Response Patient;Acceptance;Explanation;Verbal Demonstration   Weight Bearing Status Patient Response Patient;Acceptance;Explanation;Verbal Demonstration   Additional Comments Educated pt on importance of frequent mobiltiy including sitting at EOB for all meals with staff assistance until pt can transfer to the chair   Physical Therapy Initial  Treatment Plan    Treatment Plan  Bed Mobility;Gait Training;Neuro Re-Education / Balance;Self Care / Home Evaluation;Therapeutic Activities;Therapeutic Exercise   Treatment Frequency 4 Times per Week   Duration Until Therapy Goals Met   Problem List    Problems Pain;Impaired Bed Mobility;Impaired Transfers;Impaired Ambulation;Functional ROM Deficit;Functional Strength Deficit;Impaired Balance;Decreased Activity Tolerance;Safety Awareness Deficits / Cognition   Anticipated Discharge Equipment and Recommendations   DC Equipment Recommendations Unable to determine at this time   Discharge Recommendations Recommend post-acute placement for additional physical therapy services prior to discharge home   Interdisciplinary Plan of Care Collaboration   IDT Collaboration with  Nursing;Occupational Therapist   Patient Position at End of Therapy In Bed;Bed Alarm On;Call Light within Reach;Tray Table within Reach;Phone within Reach   Collaboration Comments RN updated   Session Information   Date / Session Number  6/13 - 1 (1/4, 6/19)     Patient seen for team evaluation with Occupational Therapist for the following reason(s):  Patient required 2 person assistance for safety and to provide effective interventions. Each discipline assisted patient with appropriate and separate goals. Due to the medical complexity, the skill of both practitioners is needed to monitor vitals, patient status, and adjust the intervention to fit the patient's needs and goals. Therapy sessions needed to be done by a certain time period for both disciplines, and did not impede patient's progress.

## 2025-06-13 NOTE — CONSULTS
MRN: [id number]  Date of palliative consult: 25  Reason for consult: GOC/ACP  Referring provider: Dr. Adler  Location of consult: [facility]02  Additional consulting services: Orthopedic surgery     HPI:   Barbra العراقي is a 80 y.o. female with medical history significant for bilateral hip arthroplasties, surgical fixation of bilateral periprosthetic femur fractures last of which was in , brought in by EMS 2025 following GLF with resultant right hip pain.  CT imaging noted subtle acute fracture of anterior superior aspect of right greater trochanter.  CT of knee with no evidence of acute fracture.  Who recommended nonoperative management with weightbearing as tolerated.  Urinary catheter was placed for urinary retention.  PT/OT pending.  Patient was evaluated by orthopedic surgery.  Hospital course complicated by urinary retention; pending renal ultrasound.    ROS:    Review of Systems   Respiratory:  Negative for shortness of breath.    Musculoskeletal:  Positive for back pain, falls and joint pain.        Pain well-managed with current regiment though it is making patient sleepy     PE:   Recent vital signs  BMI: Body mass index is 22.47 kg/m².    Temp (24hrs), Av.1 °C (97 °F), Min:35.9 °C (96.6 °F), Max:36.3 °C (97.4 °F)  Temperature: 36.3 °C (97.4 °F)  Pulse  Av.4  Min: 66  Max: 96   Blood Pressure : 130/62       Physical Exam  Constitutional:       General: She is not in acute distress.     Appearance: She is normal weight.      Comments: Patient stated she feels drowsy and asked if she could keep her eyes closed during our visit.  Intermittently open eyes and was able to participate with conversation.   Pulmonary:      Effort: No respiratory distress.   Abdominal:      Palpations: Abdomen is soft.      Tenderness: There is no abdominal tenderness.   Skin:     Coloration: Skin is pale.   Neurological:      Mental Status: She is oriented to person, place, and time.      Comments: Oriented to  event   Psychiatric:         Mood and Affect: Mood normal.         Behavior: Behavior normal.       ASSESSMENT/PLAN WITH SHARED DECISION MAKING:   PHYSICAL ASPECTS OF CARE  Palliative Performance Scale: 70% prior to hospitalization    # Periprosthetic fracture around internal prosthetic right hip joint  # Bladder outlet obstruction    SOCIAL ASPECTS OF CARE  Patient lives alone.  PCP through [facility].  Patient's daughter April lives locally.  Patient's son Clark lives in [place].    SPIRITUAL ASPECTS OF CARE   She is a born again Orthodox and evette is very important to her.  She was appreciative of spiritual care visit if available.  Order placed.    GOALS OF CARE/SERIOUS ILLNESS CONVERSATION  Introduced myself to patient. Discussed role of palliative care and reason for consult.  She is agreeable to discuss goals of care.  She stated she just received a pain pill and is drowsy (received pain pill around 11:45 AM oxycodone 10 mg).  She does not have an advanced directive and confirms she did speak with hospital medicine about CODE STATUS.  Reviewed advance directive as well as POLST form and CODE STATUS.  Patient confirms she would want her son Clark العراقي [phone] to act as her primary healthcare agent and likely would list her daughter April as second if at all.  She would like Clark listed as her first emergency contact and her daughter April is second.  She states she needs to give more thought to CODE STATUS.  She stated she likely would not want a ventilator but would probably be okay with CPR.  Provided education in regards to CPR and intubation and resuscitation efforts.  Discussed my concern with patient's age at 80 and likelihood to recover and improve back to quality of life.  She confirms she will think about this information further.  Confirmed if she does not complete POLST form or advance directive prior to discharge she can have [facility] assist her.  Recommended consideration of DNR/DNI  specially if patient is considering not wanting intubation with mechanical assisted ventilator.  She denied having other questions or needs at this time. Provided palliative care contact information and encouraged patient to reach out with any questions/needs.     Code Status: Full    ACP Documents: Provided and reviewed advance directive packet and POLST form.    Updated: Dr. Adler    20 minutes spent discussing advance care planning, this time excludes any other billed services.    Interval diagnostic studies and medical documentation entries pertinent to this case were reviewed independently by me. This patient has at least one acute or chronic illness or injury that poses a threat to life or bodily function. This patient suffers from a high risk of morbidity from additional invasive diagnostic testing or intensive treatment. Discussion of recommendations and coordination of care undertaken with primary provider/treatment team.      Nel Michaels, [email or web address].  Palliative Care Nurse Practitioner  [phone]

## 2025-06-13 NOTE — PROGRESS NOTES
Assumed patient care and received bedside report from Day RN. Patient is A&Ox4 and reports 0/10 pain at this time. Patient on RA, awake, and alert. Purewick in place.     Patient educated on the use of the call light and verbalized understanding. Bed in the lowest and locked position with bed alarm on. Personal belongings and call light within reach. Moderate Fall Risk precautions and hourly rounding in place. Safety education provided. POC discussed and patient declines any further needs at this time. Orders carried out.

## 2025-06-13 NOTE — PROGRESS NOTES
Patient report received and assumed care. Assessed patient at 0715. Patient is Aox4 and reports no pain. Patient vision impaired. Patient high fall risk. Patient NPO at this time. Patient bed is in low, locked position with bed alarm on. Standard fall precautions are in place. Personal belonging and call light are within reach. Patient reinforced to use call light when needed.

## 2025-06-13 NOTE — CARE PLAN
The patient is Stable - Low risk of patient condition declining or worsening    Shift Goals  Clinical Goals: Patient will remain free of falls duirng shift  Patient Goals: comfort  Family Goals: GUERITA    Progress made toward(s) clinical / shift goals:      Problem: Knowledge Deficit - Standard  Goal: Patient and family/care givers will demonstrate understanding of plan of care, disease process/condition, diagnostic tests and medications  Outcome: Progressing    Patient educated on plan of care, medications, and procedures. Patient is Aox4. Patient verbalizes understanding of care. Will continue to update patient on Plan of care during shift.     Problem: Pain - Standard  Goal: Alleviation of pain or a reduction in pain to the patient’s comfort goal  Outcome: Progressing    Patient had pain in lower mid bach during shift. Pain managed with PRN pain medications and postioning.     Problem: Fall Risk  Goal: Patient will remain free from falls  Outcome: Progressing    Patient remained free from falls. Patient worked with PT/OT. Patient high fall risk. Patient bed is in low, locked position with bed alarm on. Standard fall precautions are in place. Personal belonging and call light are within reach. Patient reinforced to use call light when needed.     Patient is not progressing towards the following goals:    Patient had distended bladder with bladder scan of 1381. Arellano cath placed per MD order.

## 2025-06-13 NOTE — PROGRESS NOTES
4 Eyes Skin Assessment Completed by Ayesha RN and JOB Adams.    Skin assessment is primarily focused on high risk bony prominences. Pay special attention to skin beneath and around medical devices, high risk bony prominences, skin to skin areas and areas where the patient lacks sensation to feel pain and areas where the patient previously had breakdown.     Head (Occipital):  WDL   Ears (Under Medical Devices): WDL   Nose (Under Medical Devices): WDL   Mouth:  WDL  missing teeth. Dry lips. No open sores.    Neck: WDL   Breast/Chest:  WDL   Shoulder Blades:  WDL   Spine:   WDL   (R) Arm/Elbow/Hand: WDL   (L) Arm/Elbow/Hand: bruise   Abdomen: WDL   Pannus/Groin:  WDL   Sacrum/Coccyx:   Red   (R) Ischial Tuberosity (Sit Bones):  WDL   (L) Ischial Tuberosity (Sit Bones):  WDL   (R) Leg:  Purple/maroon and Brown   (L) Leg:  Purple/maroon and Brown   (R) Heel:  Callouse, dry    (R) Foot/Toe: Red, callouse to right toe   (L) Heel: Callouse dryness    (L) Foot/Toe:  Red       DEVICES IN USE:   Respiratory Devices:  Pulse ox  Feeding Devices:  N/A   Lines & BP Monitoring Devices:  Peripheral IV and BP cuff    Orthopedic Devices:  N/A  Miscellaneous Devices:  Purewick    PROTOCOL INTERVENTIONS:   Standard/Trauma Bed:  Already in place  Low Airloss Bed:  Already in place    WOUND PHOTOS:   Completed and in EPIC     WOUND CONSULT:   N/A, no advanced wound care needs identified

## 2025-06-13 NOTE — ASSESSMENT & PLAN NOTE
Palliative care following for goal of care discussion  Patient would like to think about goal of care.    6/18  Remains full code after talk

## 2025-06-13 NOTE — ASSESSMENT & PLAN NOTE
Marked bladder distention on CT  Bladder scan noted with 1400 mL urine retention  Arellano catheter placed, added Flomax  Renal ultrasound with no hydronephrosis  Failed voiding trial on 6/16/2025, trialing again today.  Urology follow-up outpatient

## 2025-06-13 NOTE — DISCHARGE PLANNING
Care Transition Team Assessment    Patient, Barbra العراقي, is an 80-year-old admitted for fall. Patient is alert and oriented x4; information was given by the patient. Please see pt's H&P for prior medical history. Patient reports living alone in a second floor apartment with elevator access; [address] [place] [address]. Pt confirmed contacts on facesheet, daughter April [phone]. Patient does have a PCP with [facility]. Patient reports good support from a friend. Patient does not have ACP documents of any kind; patient declined AD. Patient does have transportation once medically cleared for discharge, friend can . Patient reports no concerns with food insecurity; gets food delivered through [employer]. Confirmed medical insurance is Medicare and Medicaid FFS. Preferred pharmacy is [facility] at [address] [place] [address]. Patient is independent; friend provides support as needed. Patient utilizes walker and cane. Patient denies concerns with substance abuse and mental health.    SW discussed possible PT/OT recs. Patient states preference to discharge home with home health.    Identified DC needs at this time:    PT/OT eval    Information Source  Orientation Level: Oriented X4  Information Given By: Patient  Who is responsible for making decisions for patient? : Patient    Readmission Evaluation  Is this a readmission?: No    Elopement Risk  Legal Hold: No  Ambulatory or Self Mobile in Wheelchair: Yes  Disoriented: No  Psychiatric Symptoms: None  History of Wandering: No  Elopement this Admit: No  Vocalizing Wanting to Leave: No  Displays Behaviors, Body Language Wanting to Leave: No-Not at Risk for Elopement  Elopement Risk: Not at Risk for Elopement    Interdisciplinary Discharge Planning  Lives with - Patient's Self Care Capacity: Alone and Able to Care For Self  Patient or legal guardian wants to designate a caregiver: No  Support Systems: Friends / Neighbors  Housing /  Facility: [address] / Condo (Lives on second floor)  Name of Care Facility: [facility]    Discharge Preparedness  What is your plan after discharge?: Home health care  What are your discharge supports?: Other (comment) (Friend)  Prior Functional Level: Ambulatory, Independent with Activities of Daily Living, Independent with Medication Management, Needs Assist with Activities of Daily Living, Uses Walker, Uses Cane    Functional Assesment  Prior Functional Level: Ambulatory, Independent with Activities of Daily Living, Independent with Medication Management, Needs Assist with Activities of Daily Living, Uses Walker, Uses Cane    Vision / Hearing Impairment  Vision Impairment : Yes  Right Eye Vision: Impaired (partiallu blind)  Left Eye Vision: Impaired  Hearing Impairment : No    Advance Directive  Advance Directive?: None  Advance Directive offered?: AD Booklet refused    Domestic Abuse  Have you ever been the victim of abuse or violence?: No  Possible Abuse/Neglect Reported to:: Not Applicable    Psychological Assessment  History of Substance Abuse: None  History of Psychiatric Problems: No    Discharge Risks or Barriers  Patient risk factors: Vulnerable adult    Anticipated Discharge Information  Discharge Disposition: D/T to home under HHA care in anticipation of covered skilled care (06)  Discharge Address: [address] [place] [address] [place] [address]

## 2025-06-13 NOTE — PROGRESS NOTES
Pt arrived via gurney from ED. Pt in hallway. Once room cleaned, pt transferred via slide board. Pt has one bag of belongings with her. Glasses, flashlight and keys.   Offered for safe keeping for keys pt declined.   Safety precautions in place. Frame alarm on. Pt is in no acute distress.

## 2025-06-14 ENCOUNTER — APPOINTMENT (OUTPATIENT)
Dept: RADIOLOGY | Facility: MEDICAL CENTER | Age: 80
DRG: 560 | End: 2025-06-14
Attending: STUDENT IN AN ORGANIZED HEALTH CARE EDUCATION/TRAINING PROGRAM
Payer: MEDICARE

## 2025-06-14 LAB
ANION GAP SERPL CALC-SCNC: 8 MMOL/L (ref 7–16)
BUN SERPL-MCNC: 19 MG/DL (ref 8–22)
CALCIUM SERPL-MCNC: 8.2 MG/DL (ref 8.5–10.5)
CHLORIDE SERPL-SCNC: 104 MMOL/L (ref 96–112)
CO2 SERPL-SCNC: 24 MMOL/L (ref 20–33)
CREAT SERPL-MCNC: 0.74 MG/DL (ref 0.5–1.4)
GFR SERPLBLD CREATININE-BSD FMLA CKD-EPI: 81 ML/MIN/1.73 M 2
GLUCOSE SERPL-MCNC: 123 MG/DL (ref 65–99)
MAGNESIUM SERPL-MCNC: 1.9 MG/DL (ref 1.5–2.5)
PHOSPHATE SERPL-MCNC: 3.5 MG/DL (ref 2.5–4.5)
POTASSIUM SERPL-SCNC: 4.6 MMOL/L (ref 3.6–5.5)
SODIUM SERPL-SCNC: 136 MMOL/L (ref 135–145)

## 2025-06-14 PROCEDURE — 76775 US EXAM ABDO BACK WALL LIM: CPT

## 2025-06-14 PROCEDURE — A9270 NON-COVERED ITEM OR SERVICE: HCPCS | Performed by: HOSPITALIST

## 2025-06-14 PROCEDURE — 84100 ASSAY OF PHOSPHORUS: CPT

## 2025-06-14 PROCEDURE — 700102 HCHG RX REV CODE 250 W/ 637 OVERRIDE(OP): Performed by: HOSPITALIST

## 2025-06-14 PROCEDURE — 700102 HCHG RX REV CODE 250 W/ 637 OVERRIDE(OP): Performed by: STUDENT IN AN ORGANIZED HEALTH CARE EDUCATION/TRAINING PROGRAM

## 2025-06-14 PROCEDURE — 700111 HCHG RX REV CODE 636 W/ 250 OVERRIDE (IP): Mod: JZ | Performed by: HOSPITALIST

## 2025-06-14 PROCEDURE — 80048 BASIC METABOLIC PNL TOTAL CA: CPT

## 2025-06-14 PROCEDURE — A9270 NON-COVERED ITEM OR SERVICE: HCPCS | Performed by: STUDENT IN AN ORGANIZED HEALTH CARE EDUCATION/TRAINING PROGRAM

## 2025-06-14 PROCEDURE — 770006 HCHG ROOM/CARE - MED/SURG/GYN SEMI*

## 2025-06-14 PROCEDURE — 99232 SBSQ HOSP IP/OBS MODERATE 35: CPT | Performed by: STUDENT IN AN ORGANIZED HEALTH CARE EDUCATION/TRAINING PROGRAM

## 2025-06-14 PROCEDURE — 83735 ASSAY OF MAGNESIUM: CPT

## 2025-06-14 PROCEDURE — 36415 COLL VENOUS BLD VENIPUNCTURE: CPT

## 2025-06-14 RX ADMIN — Medication 1000 UNITS: at 05:45

## 2025-06-14 RX ADMIN — DOCUSATE SODIUM 50 MG AND SENNOSIDES 8.6 MG 2 TABLET: 8.6; 5 TABLET, FILM COATED ORAL at 16:59

## 2025-06-14 RX ADMIN — Medication 500 MG: at 08:21

## 2025-06-14 RX ADMIN — TAMSULOSIN HYDROCHLORIDE 0.4 MG: 0.4 CAPSULE ORAL at 08:21

## 2025-06-14 RX ADMIN — OXYCODONE HYDROCHLORIDE 10 MG: 10 TABLET ORAL at 20:12

## 2025-06-14 RX ADMIN — DEXTROAMPHETAMINE SACCHARATE, AMPHETAMINE ASPARTATE, DEXTROAMPHETAMINE SULFATE AND AMPHETAMINE SULFATE 15 MG: 2.5; 2.5; 2.5; 2.5 TABLET ORAL at 05:45

## 2025-06-14 RX ADMIN — ENOXAPARIN SODIUM 40 MG: 100 INJECTION SUBCUTANEOUS at 17:00

## 2025-06-14 RX ADMIN — OXYCODONE HYDROCHLORIDE 10 MG: 10 TABLET ORAL at 08:26

## 2025-06-14 RX ADMIN — Medication 5 MG: at 20:03

## 2025-06-14 RX ADMIN — OXYCODONE HYDROCHLORIDE 10 MG: 10 TABLET ORAL at 16:12

## 2025-06-14 ASSESSMENT — ENCOUNTER SYMPTOMS
NAUSEA: 0
SHORTNESS OF BREATH: 1

## 2025-06-14 ASSESSMENT — PAIN DESCRIPTION - PAIN TYPE
TYPE: ACUTE PAIN

## 2025-06-14 NOTE — PROGRESS NOTES
Hospital Medicine Daily Progress Note    Date of Service  6/14/2025    Chief Complaint  Barbra العراقي is a 80 y.o. female admitted 6/12/2025 with right hip pain     Hospital Course    80 y.o. female prior orthopedic hip surgeries with a prior right proximal femur periprosthetic fracture with prior open reduction internal fixation on 8/29/2023 presented with right hip pain after she tripped and fallen.  Subsequent CT hip noted with subtle acute fracture of anterior superior aspect of right greater trochanter..  CT knee with no evidence of acute fracture.  Orthopedist Dr. Hidalgo evaluated and recommended nonoperative management with weight bearing as tolerated.  Arellano was placed for retention.  PT/OT evaluation pending    Interval Problem Update    6/14/2025  Seen and examined at bedside  Vitals been stable, pain well-managed  Labs with no white count, hemoglobin 12.1, 22 sodium 132 potassium 4.6, renal function stable, phosphorus 3.5 magnesium 1.9  Renal ultrasound with no evidence of hydronephrosis  Chart reviewed, meds reviewed  Plan  Initiated voiding trial in a.m. I have placed order  Added Flomax  Repeat BMP in  a.m. to monitor renal function, electrolytes  On IV narcotics for pain management  Monitor for respiratory toxicity while on IV narcotics  PT OT recommended SNF.  I have consulted PMR  Palliative care discussed with the patient regarding goal of care.    I did rediscuss with the patient regarding goal of care.  Reports he would have to make a decision I would like to talk with palliative care which is scheduled on Monday.      I have discussed this patient's plan of care and discharge plan at IDT rounds today with Case Management, Nursing, Nursing leadership, and other members of the IDT team.    Consultants/Specialty  orthopedics    Code Status  Full Code    Disposition    Anticipate discharge to: skilled nursing facility    I have placed the appropriate orders for post-discharge needs.    Review of  Systems  Review of Systems   Constitutional:  Positive for malaise/fatigue.   Respiratory:  Positive for shortness of breath.    Cardiovascular:  Positive for chest pain.   Gastrointestinal:  Negative for nausea.   Musculoskeletal:  Positive for joint pain.        Physical Exam  Temp:  [35.9 °C (96.7 °F)-36.4 °C (97.6 °F)] 36.4 °C (97.6 °F)  Pulse:  [] 76  Resp:  [12-20] 20  BP: ()/(50-59) 116/50  SpO2:  [91 %-96 %] 91 %    Physical Exam  Constitutional:       Appearance: She is ill-appearing.      Comments: Appeared frail on exam   Cardiovascular:      Rate and Rhythm: Normal rate and regular rhythm.      Pulses: Normal pulses.      Heart sounds: Normal heart sounds.   Pulmonary:      Effort: Pulmonary effort is normal. No respiratory distress.   Musculoskeletal:      Comments: Range of motion limited in right hip due to pain   Skin:     General: Skin is warm.   Neurological:      General: No focal deficit present.      Mental Status: She is alert.   Psychiatric:         Mood and Affect: Mood normal.         Fluids    Intake/Output Summary (Last 24 hours) at 6/14/2025 1424  Last data filed at 6/14/2025 0500  Gross per 24 hour   Intake 275 ml   Output 700 ml   Net -425 ml        Laboratory  Recent Labs     06/12/25  0526 06/13/25  0043   WBC 9.4 8.5   RBC 4.24 4.13*   HEMOGLOBIN 12.9 12.1   HEMATOCRIT 38.1 38.2   MCV 89.9 92.5   MCH 30.4 29.3   MCHC 33.9 31.7*   RDW 42.5 44.3   PLATELETCT 174 189   MPV 9.2 9.6     Recent Labs     06/12/25  0526 06/13/25  0043 06/14/25  0100   SODIUM 137 136 136   POTASSIUM 3.9 3.9 4.6   CHLORIDE 102 103 104   CO2 24 24 24   GLUCOSE 122* 105* 123*   BUN 25* 19 19   CREATININE 0.50 0.57 0.74   CALCIUM 9.1 8.7 8.2*     Recent Labs     06/12/25 0526   APTT 26.5   INR 0.98               Imaging  US-RENAL   Final Result      No evidence of solid renal mass or hydronephrosis.      CT-KNEE W/O PLUS RECONS RIGHT   Final Result      1.  Suboptimal exam related to osteopenia.   2.   No evidence of acute fracture.   3.  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   4.  Mild subcutaneous edema.      CT-HIP W/O PLUS RECONS RIGHT   Final Result         1.  Possible subtle acute fracture of the anterior superior aspect of the right greater trochanter, limited evaluation due to metallic streak and scatter artifacts.   2.  Marked bladder distention, consider bladder outlet obstruction.   3.  Diverticulosis.         DX-FEMUR-2+ RIGHT   Final Result         1.  No radiographic evidence of acute traumatic injury.           Assessment/Plan  * Periprosthetic fracture around internal prosthetic hip joint, initial encounter- (present on admission)  Assessment & Plan    6/14/2025  Initiated voiding trial in a.m. I have placed order  Added Flomax  Repeat BMP in  a.m. to monitor renal function, electrolytes  On IV narcotics for pain management  Monitor for respiratory toxicity while on IV narcotics  PT OT recommended SNF.  I have consulted PMR  Palliative care discussed with the patient regarding goal of care.      Bladder outlet obstruction  Assessment & Plan  Marked bladder distention on CT  Bladder scan noted with 1400 mL urine retention  Arellano catheter placed, added Flomax  Renal ultrasound was ordered for further evaluation  6/14/2025    Voiding trial in a.m.    ACP (advance care planning)  Assessment & Plan  80 female with right  subtle acute fracture of anterior superior aspect of right greater trochanter.  I had a prolonged discussion with patient regarding goal of care, diagnosis, prognosis and CODE STATUS.  We discussed her prognosis and comorbidities.  I spent 17  minutes advance care planning.  Discussed full resuscitation to include chest compression, medication, defibrillation and intubation.  Patient stated to remain full code for now and would like to think, she also requested to talk with the palliative for further goal of care discussion.    Palliative care evaluated  Patient would like  to think about goal of care.                 VTE prophylaxis: lovenox    I have performed a physical exam and reviewed and updated ROS and Plan today (6/14/2025). In review of yesterday's note (6/13/2025), there are no changes except as documented above.

## 2025-06-14 NOTE — CARE PLAN
The patient is Stable - Low risk of patient condition declining or worsening    Shift Goals  Clinical Goals: pain management  Patient Goals: to be comfortable  Family Goals: GUERITA    Progress made toward(s) clinical / shift goals:        Problem: Pain - Standard  Goal: Alleviation of pain or a reduction in pain to the patient’s comfort goal  Outcome: Progressing   Continues with pain to R hand and to back, prn pain meds given as ordered    Problem: Fall Risk  Goal: Patient will remain free from falls  Outcome: Progressing   Moderate fall precautions are in place

## 2025-06-14 NOTE — PROGRESS NOTES
Assumed patient care and received bedside report from Day RN. Patient is A&Ox4 and reports 8/10 pain of the back/R hip & leg, medicated per MAR. Non-pharmacological interventions encouraged like repositioning. Patient on RA, awake, and alert. Arellano catheter in place.     Patient educated on the use of the call light and verbalized understanding. Bed in the lowest and locked position with frame alarm on. Personal belongings and call light within reach. Moderate Fall Risk precautions and hourly rounding in place. Safety education provided. POC discussed and patient declines any further needs at this time. Orders carried out.

## 2025-06-14 NOTE — THERAPY
"Occupational Therapy   Initial Evaluation     Patient Name:  Barbra العراقي  Age:  80 y.o., Sex:  female  Medical Record #:  [id number]  Today's Date:  6/13/2025     Precautions  Medical: Fall Risk  Weight Bearing: Weight Bearing as Tolerated Right Lower Extremity    Assessment    Patient is 80 y.o. female admitted following GLF with R hip pain, sustained subtle acute fracture of anterior superior aspect of right greater trochanter, Ortho recommends nonoperative management with weight bearing as tolerated. ADL independence is currently limited by pain, fear of increased pain and fear of falling. Pt will benefit from acute OT as well as post-acute placement to additional OT intervention prior to DC home.     Plan    Occupational Therapy Initial Treatment Plan   Treatment Interventions: Self Care / Activities of Daily Living, Therapeutic Exercises, Therapeutic Activity, Adaptive Equipment  Treatment Frequency: 3 Times per Week  Duration: Until Therapy Goals Met    DC Equipment Recommendations: Unable to determine at this time  Discharge Recommendations: Recommend post-acute placement for additional occupational therapy services prior to discharge home     Objective       06/13/25 1453   Prior Living Situation   Prior Services None   Housing / Facility 2 Story House   Elevator Yes   Bathroom Set up Bathtub / Shower Combination   Equipment Owned Front-Wheel Walker;Single Point Cane;Sock Aid;Tub Transfer Bench   Lives with - Patient's Self Care Capacity Alone and Able to Care For Self   Comments reports some friends, does not want to rely on her daughter as \"she can be difficult\"   Prior Level of ADL Function   Self Feeding Independent   Grooming / Hygiene Independent   Bathing Independent   Dressing Independent   Toileting Independent   Comments unclear if pt is reliable historian   Prior Level of IADL Function   Medication Management Independent   Laundry Independent   Kitchen Mobility Independent   Finances Independent "   Home Management Independent   Shopping Independent   Prior Level Of Mobility Independent Without Device in Community;Supervision Without Device in Home   Comments pt reports prior independence   History of Falls   History of Falls Yes   Date of Last Fall   (reason for admit)   Precautions   Medical Fall Risk   Weight Bearing Weight Bearing as Tolerated Right Lower Extremity   Pain 0 - 10 Group   Therapist Pain Assessment Post Activity Pain Same as Prior to Activity;Nurse Notified  (pain in R hip not rated)   Cognition    Cognition / Consciousness X   Level of Consciousness Alert   Safety Awareness Impaired   New Learning Impaired   Attention Impaired   Comments   (pleasant, odd affect, tangential, non-linear thoughts unrelated to OT eval)   Strength Upper Body   Upper Body Strength  WDL   Upper Body Muscle Tone   Upper Body Muscle Tone  WDL   Coordination Upper Body   Coordination WDL   Balance Assessment   Sitting Balance (Static) Fair   Sitting Balance (Dynamic) Fair -   Standing Balance (Static) Poor +   Standing Balance (Dynamic) Poor   Weight Shift Sitting Fair   Weight Shift Standing Poor   Comments FWW   Bed Mobility    Supine to Sit Moderate Assist   Sit to Supine Maximal Assist   Scooting Minimal Assist   ADL Assessment   Eating Supervision   Grooming Minimal Assist;Seated   Upper Body Dressing Minimal Assist   Lower Body Dressing Maximal Assist   Toileting Maximal Assist   How much help from another person does the patient currently need...   Putting on and taking off regular lower body clothing? 2   Bathing (including washing, rinsing, and drying)? 2   Toileting, which includes using a toilet, bedpan, or urinal? 2   Putting on and taking off regular upper body clothing? 3   Taking care of personal grooming such as brushing teeth? 3   Eating meals? 4   6 Clicks Daily Activity Score 16   Functional Mobility   Sit to Stand Moderate Assist   Bed, Chair, Wheelchair Transfer Unable to Participate   Mobility 1  side step with FWW   Comments limited by pain   Activity Tolerance   Sitting in Chair NT   Sitting Edge of Bed 8min   Standing 1min x1   Short Term Goals   Short Term Goal # 1 Pt will complete LB dress with SPV and AE PRN   Short Term Goal # 2 Pt will complete toileting ADL at SPV level   Short Term Goal # 3 pt will complete standing grooming at sink with SPV   Education Group   Education Provided Role of Occupational Therapist;Activities of Daily Living;Weight Bearing Precautions;Transfers;Home Safety;Pathology of bedrest   Role of Occupational Therapist Patient Response Patient;Acceptance;Explanation;Verbal Demonstration   Home Safety Patient Response Patient;Acceptance;Explanation;Verbal Demonstration   Transfers Patient Response Patient;Acceptance;Explanation;Verbal Demonstration   ADL Patient Response Patient;Acceptance;Explanation;Verbal Demonstration   Weight Bearing Precautions Patient Response Patient;Acceptance;Explanation;Verbal Demonstration   Pathology of Bedrest Patient Response Patient;Acceptance;Explanation;Verbal Demonstration   Occupational Therapy Initial Treatment Plan    Treatment Interventions Self Care / Activities of Daily Living;Therapeutic Exercises;Therapeutic Activity;Adaptive Equipment   Treatment Frequency 3 Times per Week   Duration Until Therapy Goals Met   Problem List   Problem List Decreased Active Daily Living Skills;Decreased Upper Extremity Strength Right;Decreased Homemaking Skills;Decreased Upper Extremity Strength Left;Decreased Functional Mobility;Decreased Activity Tolerance;Safety Awareness Deficits / Cognition;Impaired Cognitive Function;Impaired Postural Control / Balance;Limited Knowledge of Post Op Precautions   Anticipated Discharge Equipment and Recommendations   DC Equipment Recommendations Unable to determine at this time   Discharge Recommendations Recommend post-acute placement for additional occupational therapy services prior to discharge home    Interdisciplinary Plan of Care Collaboration   IDT Collaboration with  Nursing;Physical Therapist   Patient Position at End of Therapy Seated;In Bed;Bed Alarm On;Call Light within Reach;Tray Table within Reach;Phone within Reach

## 2025-06-14 NOTE — CARE PLAN
The patient is Stable - Low risk of patient condition declining or worsening    Shift Goals  Clinical Goals: Patient will remain free from falls, report a tolerable pain level  Patient Goals: Rest, comfort  Family Goals: GUERITA    Progress made toward(s) clinical / shift goals:  Bed in the lowest and locked position with frame alarm on. Moderate Fall Risk precautions and hourly rounding in place. Patient remained free from falls this shift. Patient educated on the pain rating scale and the medications available for interventions. Q4H pain assessments and Q2H PO pain medication assessments in place. Patient medicated per MR.       Problem: Knowledge Deficit - Standard  Goal: Patient and family/care givers will demonstrate understanding of plan of care, disease process/condition, diagnostic tests and medications  Outcome: Progressing     Problem: Pain - Standard  Goal: Alleviation of pain or a reduction in pain to the patient’s comfort goal  Description: Target End Date:  Prior to discharge or change in level of care    Document on Vitals flowsheet    1.  Document pain using the appropriate pain scale per order or unit policy  2.  Educate and implement non-pharmacologic comfort measures (i.e. relaxation, distraction, massage, cold/heat therapy, etc.)  3.  Pain management medications as ordered  4.  Reassess pain after pain med administration per policy  5.  If opiods administered assess patient's response to pain medication is appropriate per POSS sedation scale  6.  Follow pain management plan developed in collaboration with patient and interdisciplinary team (including palliative care or pain specialists if applicable)  Outcome: Progressing     Problem: Fall Risk  Goal: Patient will remain free from falls  Description: Target End Date:  Prior to discharge or change in level of care    Document interventions on the Amie Wright Fall Risk Assessment    1.  Assess for fall risk factors  2.  Implement fall  precautions  Outcome: Progressing       Patient is not progressing towards the following goals:

## 2025-06-14 NOTE — PROGRESS NOTES
Received report and assumed care of patient at change of shift. Patient is A&Ox4, on RA, and reports pain to R hip and back, oxycodone 10 mg given. Patient assessment completed, bed in lowest position, and call light and personal belongings are within reach. Patient expressed no further needs at this time.

## 2025-06-15 PROBLEM — E87.1 HYPONATREMIA: Status: ACTIVE | Noted: 2025-06-15

## 2025-06-15 PROBLEM — E44.0 MODERATE PROTEIN-CALORIE MALNUTRITION (HCC): Status: ACTIVE | Noted: 2025-06-15

## 2025-06-15 LAB
ANION GAP SERPL CALC-SCNC: 9 MMOL/L (ref 7–16)
ANION GAP SERPL CALC-SCNC: 9 MMOL/L (ref 7–16)
BUN SERPL-MCNC: 14 MG/DL (ref 8–22)
BUN SERPL-MCNC: 14 MG/DL (ref 8–22)
CALCIUM SERPL-MCNC: 8.1 MG/DL (ref 8.5–10.5)
CALCIUM SERPL-MCNC: 8.3 MG/DL (ref 8.5–10.5)
CHLORIDE SERPL-SCNC: 102 MMOL/L (ref 96–112)
CHLORIDE SERPL-SCNC: 99 MMOL/L (ref 96–112)
CO2 SERPL-SCNC: 21 MMOL/L (ref 20–33)
CO2 SERPL-SCNC: 22 MMOL/L (ref 20–33)
CREAT SERPL-MCNC: 0.54 MG/DL (ref 0.5–1.4)
CREAT SERPL-MCNC: 0.55 MG/DL (ref 0.5–1.4)
GFR SERPLBLD CREATININE-BSD FMLA CKD-EPI: 91 ML/MIN/1.73 M 2
GFR SERPLBLD CREATININE-BSD FMLA CKD-EPI: 92 ML/MIN/1.73 M 2
GLUCOSE SERPL-MCNC: 113 MG/DL (ref 65–99)
GLUCOSE SERPL-MCNC: 121 MG/DL (ref 65–99)
MAGNESIUM SERPL-MCNC: 2 MG/DL (ref 1.5–2.5)
PHOSPHATE SERPL-MCNC: 3.7 MG/DL (ref 2.5–4.5)
POTASSIUM SERPL-SCNC: 4.1 MMOL/L (ref 3.6–5.5)
POTASSIUM SERPL-SCNC: 4.1 MMOL/L (ref 3.6–5.5)
SODIUM SERPL-SCNC: 130 MMOL/L (ref 135–145)
SODIUM SERPL-SCNC: 132 MMOL/L (ref 135–145)

## 2025-06-15 PROCEDURE — 700102 HCHG RX REV CODE 250 W/ 637 OVERRIDE(OP): Performed by: STUDENT IN AN ORGANIZED HEALTH CARE EDUCATION/TRAINING PROGRAM

## 2025-06-15 PROCEDURE — 51798 US URINE CAPACITY MEASURE: CPT

## 2025-06-15 PROCEDURE — 700102 HCHG RX REV CODE 250 W/ 637 OVERRIDE(OP): Performed by: HOSPITALIST

## 2025-06-15 PROCEDURE — A9270 NON-COVERED ITEM OR SERVICE: HCPCS | Performed by: HOSPITALIST

## 2025-06-15 PROCEDURE — 700111 HCHG RX REV CODE 636 W/ 250 OVERRIDE (IP): Mod: JZ | Performed by: HOSPITALIST

## 2025-06-15 PROCEDURE — 36415 COLL VENOUS BLD VENIPUNCTURE: CPT

## 2025-06-15 PROCEDURE — 83735 ASSAY OF MAGNESIUM: CPT

## 2025-06-15 PROCEDURE — 80048 BASIC METABOLIC PNL TOTAL CA: CPT

## 2025-06-15 PROCEDURE — 84100 ASSAY OF PHOSPHORUS: CPT

## 2025-06-15 PROCEDURE — 770006 HCHG ROOM/CARE - MED/SURG/GYN SEMI*

## 2025-06-15 PROCEDURE — 99232 SBSQ HOSP IP/OBS MODERATE 35: CPT | Performed by: STUDENT IN AN ORGANIZED HEALTH CARE EDUCATION/TRAINING PROGRAM

## 2025-06-15 PROCEDURE — A9270 NON-COVERED ITEM OR SERVICE: HCPCS | Performed by: STUDENT IN AN ORGANIZED HEALTH CARE EDUCATION/TRAINING PROGRAM

## 2025-06-15 RX ORDER — GAUZE BANDAGE 2" X 2"
100 BANDAGE TOPICAL DAILY
Status: DISCONTINUED | OUTPATIENT
Start: 2025-06-15 | End: 2025-06-19 | Stop reason: HOSPADM

## 2025-06-15 RX ADMIN — OXYCODONE HYDROCHLORIDE 10 MG: 10 TABLET ORAL at 06:13

## 2025-06-15 RX ADMIN — TAMSULOSIN HYDROCHLORIDE 0.4 MG: 0.4 CAPSULE ORAL at 09:50

## 2025-06-15 RX ADMIN — OXYCODONE HYDROCHLORIDE 10 MG: 10 TABLET ORAL at 17:25

## 2025-06-15 RX ADMIN — Medication 5 MG: at 20:19

## 2025-06-15 RX ADMIN — Medication 1000 UNITS: at 06:13

## 2025-06-15 RX ADMIN — OXYCODONE HYDROCHLORIDE 10 MG: 10 TABLET ORAL at 13:54

## 2025-06-15 RX ADMIN — DEXTROAMPHETAMINE SACCHARATE, AMPHETAMINE ASPARTATE, DEXTROAMPHETAMINE SULFATE AND AMPHETAMINE SULFATE 15 MG: 2.5; 2.5; 2.5; 2.5 TABLET ORAL at 06:12

## 2025-06-15 RX ADMIN — Medication 100 MG: at 13:54

## 2025-06-15 RX ADMIN — Medication 500 MG: at 09:49

## 2025-06-15 RX ADMIN — ENOXAPARIN SODIUM 40 MG: 100 INJECTION SUBCUTANEOUS at 17:24

## 2025-06-15 ASSESSMENT — ENCOUNTER SYMPTOMS
SHORTNESS OF BREATH: 1
NAUSEA: 0

## 2025-06-15 ASSESSMENT — PAIN DESCRIPTION - PAIN TYPE
TYPE: ACUTE PAIN

## 2025-06-15 ASSESSMENT — FIBROSIS 4 INDEX: FIB4 SCORE: 2.03

## 2025-06-15 NOTE — ASSESSMENT & PLAN NOTE
Concern for malnutrition  Poor appetite, requested for protein supplement  evidenced by  muscle wasting .   Dietitian consulted  Monitor weight, prevent wt loss   Monitor electrolytes to assess risk for refeeding syndrome,

## 2025-06-15 NOTE — PROGRESS NOTES
Received care of pt from NOC RN this am. Pt is A+O x 4 with prompts. Denies need for pain medication this am. On a Q 2 hr turn and reposition schedule.

## 2025-06-15 NOTE — CARE PLAN
The patient is Stable - Low risk of patient condition declining or worsening    Shift Goals  Clinical Goals: turns Q 2 hr, void  Patient Goals: Keeping informed about her plan of care  Family Goals: GUERITA      Problem: Pain - Standard  Goal: Alleviation of pain or a reduction in pain to the patient’s comfort goal  Outcome: Progressing  Note: Medicated for pain control, see mar / doc flowsheets     Problem: Skin Integrity  Goal: Skin integrity is maintained or improved  Outcome: Progressing  Note: ON A Q 2 HR TURN SCHEDULE WHILE IN BED TODAY         Progress made toward(s) clinical / shift goals:  keeping pt updated concerning her POC    Patient is not progressing towards the following goals: needed a straight cath this afternoon.

## 2025-06-15 NOTE — DISCHARGE PLANNING
[facility]    Referral from:  Dr. Adler  Insurance Provider on Facesheet:  [employer]  Potential Rehab diagnosis:  Other Ortho    Chart review indicates patient may have ongoing medical management and therapy needs to possibly meet inpatient rehab facility criteria with the goal of returning to community.      D/C Support:  Friend    No Physiatry consult at this time.  [employer] HMO not provider for RR.  Anticipate post acute services in-network with [employer] insurance.  TCC signing off.  Please reach out if PMR consult requested for medical management.     Last Covid test:    Thank you for the referral.

## 2025-06-15 NOTE — CARE PLAN
The patient is Stable - Low risk of patient condition declining or worsening    Shift Goals  Clinical Goals: pts pain will be controlled to comfort level; pt skin integrity will be maintained throughout the shift; pt will remain free from falls  Patient Goals: feel comfortable  Family Goals: GUERITA    Progress made toward(s) clinical / shift goals:        Problem: Pain - Standard  Goal: Alleviation of pain or a reduction in pain to the patient’s comfort goal  Outcome: Progressing  Note: Patient's pain controlled to comfort level following medication intervention as evidenced by patient resting comfortably with even & unlabored breathing.      Problem: Fall Risk  Goal: Patient will remain free from falls  Outcome: Progressing  Note: Patient remained free from falls throughout the shift. Bed locked in lowest position with frame alarm on. Treaded slipper socks in place. Call light within reach. Hourly rounding. Patient calls appropriately.      Problem: Skin Integrity  Goal: Skin integrity is maintained or improved  Outcome: Progressing  Note: G4ppmge & low airloss in place. Indwelling catheter in place with torres care completed.        Patient is not progressing towards the following goals:

## 2025-06-15 NOTE — PROGRESS NOTES
Hospital Medicine Daily Progress Note    Date of Service  6/15/2025    Chief Complaint  Barbra العراقي is a 80 y.o. female admitted 6/12/2025 with right hip pain     Hospital Course    80 y.o. female prior orthopedic hip surgeries with a prior right proximal femur periprosthetic fracture with prior open reduction internal fixation on 8/29/2023 presented with right hip pain after she tripped and fallen.  Subsequent CT hip noted with subtle acute fracture of anterior superior aspect of right greater trochanter..  CT knee with no evidence of acute fracture.  Orthopedist Dr. Hidalgo evaluated and recommended nonoperative management with weight bearing as tolerated.  Arellano was placed for retention.  PT/OT evaluation pending    Interval Problem Update      6/15/2025  Seen and examined at bedside  Vital remained stable  Labs reviewed with worsening sodium level sodium 130    Plan  Arellano catheter discontinued, monitor bladder scan  Continue Flomax  Repeat BMP now if sodium level continue to drop, consider starting IV fluid as patient remain dehydrated.  I have encouraged her to increase p.o. intake  Repeat BMP in  a.m. to monitor renal function, electrolytes  PT OT recommended SNF.   [facility]  Palliative care discussed with the patient regarding goal of care.    Risk of decompensation from ongoing hyponatremia.  Need close monitoring with repeat labs    I have discussed this patient's plan of care and discharge plan at IDT rounds today with Case Management, Nursing, Nursing leadership, and other members of the IDT team.    Consultants/Specialty  orthopedics    Code Status  Full Code    Disposition    Anticipate discharge to: skilled nursing facility    I have placed the appropriate orders for post-discharge needs.    Review of Systems  Review of Systems   Constitutional:  Positive for malaise/fatigue.   Respiratory:  Positive for shortness of breath.    Cardiovascular:  Positive for chest pain.   Gastrointestinal:   Negative for nausea.   Musculoskeletal:  Positive for joint pain.        Physical Exam  Temp:  [36.3 °C (97.3 °F)-36.7 °C (98 °F)] 36.7 °C (98 °F)  Pulse:  [60-85] 60  Resp:  [14-20] 14  BP: ()/(46-69) 100/50  SpO2:  [90 %-94 %] 90 %    Physical Exam  Constitutional:       Appearance: She is ill-appearing.      Comments: Appeared frail on exam   Cardiovascular:      Rate and Rhythm: Normal rate and regular rhythm.      Pulses: Normal pulses.      Heart sounds: Normal heart sounds.   Pulmonary:      Effort: Pulmonary effort is normal. No respiratory distress.   Musculoskeletal:      Comments: Range of motion limited in right hip due to pain   Skin:     General: Skin is warm.   Neurological:      General: No focal deficit present.      Mental Status: She is alert.   Psychiatric:         Mood and Affect: Mood normal.         Fluids    Intake/Output Summary (Last 24 hours) at 6/15/2025 1336  Last data filed at 6/15/2025 0613  Gross per 24 hour   Intake 240 ml   Output 800 ml   Net -560 ml        Laboratory  Recent Labs     06/13/25  0043   WBC 8.5   RBC 4.13*   HEMOGLOBIN 12.1   HEMATOCRIT 38.2   MCV 92.5   MCH 29.3   MCHC 31.7*   RDW 44.3   PLATELETCT 189   MPV 9.6     Recent Labs     06/13/25  0043 06/14/25  0100 06/15/25  0540   SODIUM 136 136 130*   POTASSIUM 3.9 4.6 4.1   CHLORIDE 103 104 99   CO2 24 24 22   GLUCOSE 105* 123* 113*   BUN 19 19 14   CREATININE 0.57 0.74 0.54   CALCIUM 8.7 8.2* 8.1*                     Imaging  US-RENAL   Final Result      No evidence of solid renal mass or hydronephrosis.      CT-KNEE W/O PLUS RECONS RIGHT   Final Result      1.  Suboptimal exam related to osteopenia.   2.  No evidence of acute fracture.   3.  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   4.  Mild subcutaneous edema.      CT-HIP W/O PLUS RECONS RIGHT   Final Result         1.  Possible subtle acute fracture of the anterior superior aspect of the right greater trochanter, limited evaluation due to metallic  streak and scatter artifacts.   2.  Marked bladder distention, consider bladder outlet obstruction.   3.  Diverticulosis.         DX-FEMUR-2+ RIGHT   Final Result         1.  No radiographic evidence of acute traumatic injury.           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  I have encouraged her to increase p.o. intake  Repeat BMP in  a.m. to monitor renal function, electrolytes  PT OT recommended SNF.   [facility]  Palliative care discussed with the patient regarding goal of care.    Risk of decompensation from ongoing hyponatremia.  Need close monitoring with repeat labs    Moderate protein-calorie malnutrition (HCC)  Assessment & Plan  Concern for malnutrition  Poor appetite, requested for protein supplement  evidenced by  muscle wasting .   Dietitian consulted  Monitor weight, prevent wt loss   Monitor electrolytes to assess risk for refeeding syndrome,      Hyponatremia  Assessment & Plan  Likely dehydration, unable to rule out SIADH  Repeat labs  If low will start on IV fluid and continue monitor sodium level    Bladder outlet obstruction  Assessment & Plan  Marked bladder distention on CT  Bladder scan noted with 1400 mL urine retention  Arellano catheter placed, added Flomax  Renal ultrasound was ordered for further evaluation  6/14/2025    Voiding trial in a.m.    ACP (advance care planning)  Assessment & Plan  80 female with right  subtle acute fracture of anterior superior aspect of right greater trochanter.  I had a prolonged discussion with patient regarding goal of care, diagnosis, prognosis and CODE STATUS.  We discussed her prognosis and comorbidities.  I spent 17  minutes advance care planning.  Discussed  full resuscitation to include chest compression, medication, defibrillation and intubation.  Patient stated to remain full code for now and would like to think, she also requested to talk with the palliative for further goal of care discussion.    Palliative care evaluated  Patient would like to think about goal of care.                 VTE prophylaxis: lovenox    I have performed a physical exam and reviewed and updated ROS and Plan today (6/15/2025). In review of yesterday's note (6/14/2025), there are no changes except as documented above.

## 2025-06-15 NOTE — ASSESSMENT & PLAN NOTE
Likely dehydration, unable to rule out SIADH  Repeat labs  If low will start on IV fluid and continue monitor sodium level    6/17  Improved

## 2025-06-15 NOTE — PROGRESS NOTES
Received report from day shift RN and assumed pt care at 1900. Patient assessment completed. Patient is A&Ox4 and on RA. Patient report pain of 7   /10. Medicated per MAR. Bed locked and in the lowest position. Bed alarm on. Call light within reach. Plan of care discussed and Patient expresses no further needs at this time.

## 2025-06-16 PROBLEM — N39.0 ACUTE UTI: Status: ACTIVE | Noted: 2025-06-16

## 2025-06-16 LAB
AMORPHOUS CRYSTALS 1764: PRESENT /HPF
ANION GAP SERPL CALC-SCNC: 12 MMOL/L (ref 7–16)
APPEARANCE UR: ABNORMAL
BACTERIA #/AREA URNS HPF: ABNORMAL /HPF
BILIRUB UR QL STRIP.AUTO: ABNORMAL
BUN SERPL-MCNC: 16 MG/DL (ref 8–22)
CALCIUM SERPL-MCNC: 8.4 MG/DL (ref 8.5–10.5)
CASTS URNS QL MICRO: ABNORMAL /LPF (ref 0–2)
CHLORIDE SERPL-SCNC: 98 MMOL/L (ref 96–112)
CO2 SERPL-SCNC: 21 MMOL/L (ref 20–33)
COLOR UR: YELLOW
CREAT SERPL-MCNC: 0.66 MG/DL (ref 0.5–1.4)
EPITHELIAL CELLS 1715: ABNORMAL /HPF (ref 0–5)
GFR SERPLBLD CREATININE-BSD FMLA CKD-EPI: 87 ML/MIN/1.73 M 2
GLUCOSE SERPL-MCNC: 136 MG/DL (ref 65–99)
GLUCOSE UR STRIP.AUTO-MCNC: NEGATIVE MG/DL
KETONES UR STRIP.AUTO-MCNC: NEGATIVE MG/DL
LEUKOCYTE ESTERASE UR QL STRIP.AUTO: ABNORMAL
MAGNESIUM SERPL-MCNC: 1.8 MG/DL (ref 1.5–2.5)
MICRO URNS: ABNORMAL
MUCOUS THREADS URNS QL MICRO: PRESENT /HPF
NITRITE UR QL STRIP.AUTO: POSITIVE
PH UR STRIP.AUTO: 8.5 [PH] (ref 5–8)
PHOSPHATE SERPL-MCNC: 3.6 MG/DL (ref 2.5–4.5)
POTASSIUM SERPL-SCNC: 4.1 MMOL/L (ref 3.6–5.5)
PROT UR QL STRIP: 300 MG/DL
RBC # URNS HPF: ABNORMAL /HPF (ref 0–2)
RBC UR QL AUTO: ABNORMAL
SODIUM SERPL-SCNC: 131 MMOL/L (ref 135–145)
SP GR UR STRIP.AUTO: 1
TRIPLE PHOS CRYSTAL  1767: PRESENT /HPF
UROBILINOGEN UR STRIP.AUTO-MCNC: 0.2 EU/DL
WBC #/AREA URNS HPF: ABNORMAL /HPF

## 2025-06-16 PROCEDURE — 99223 1ST HOSP IP/OBS HIGH 75: CPT | Performed by: PHYSICAL MEDICINE & REHABILITATION

## 2025-06-16 PROCEDURE — A9270 NON-COVERED ITEM OR SERVICE: HCPCS | Performed by: HOSPITALIST

## 2025-06-16 PROCEDURE — 51798 US URINE CAPACITY MEASURE: CPT

## 2025-06-16 PROCEDURE — 84100 ASSAY OF PHOSPHORUS: CPT

## 2025-06-16 PROCEDURE — A9270 NON-COVERED ITEM OR SERVICE: HCPCS | Performed by: PHYSICAL MEDICINE & REHABILITATION

## 2025-06-16 PROCEDURE — 700102 HCHG RX REV CODE 250 W/ 637 OVERRIDE(OP): Performed by: STUDENT IN AN ORGANIZED HEALTH CARE EDUCATION/TRAINING PROGRAM

## 2025-06-16 PROCEDURE — 700111 HCHG RX REV CODE 636 W/ 250 OVERRIDE (IP): Mod: JZ | Performed by: HOSPITALIST

## 2025-06-16 PROCEDURE — 770006 HCHG ROOM/CARE - MED/SURG/GYN SEMI*

## 2025-06-16 PROCEDURE — 83735 ASSAY OF MAGNESIUM: CPT

## 2025-06-16 PROCEDURE — 700111 HCHG RX REV CODE 636 W/ 250 OVERRIDE (IP): Performed by: STUDENT IN AN ORGANIZED HEALTH CARE EDUCATION/TRAINING PROGRAM

## 2025-06-16 PROCEDURE — 700102 HCHG RX REV CODE 250 W/ 637 OVERRIDE(OP): Performed by: PHYSICAL MEDICINE & REHABILITATION

## 2025-06-16 PROCEDURE — 87186 SC STD MICRODIL/AGAR DIL: CPT

## 2025-06-16 PROCEDURE — 36415 COLL VENOUS BLD VENIPUNCTURE: CPT

## 2025-06-16 PROCEDURE — 700102 HCHG RX REV CODE 250 W/ 637 OVERRIDE(OP): Performed by: HOSPITALIST

## 2025-06-16 PROCEDURE — 700105 HCHG RX REV CODE 258: Performed by: STUDENT IN AN ORGANIZED HEALTH CARE EDUCATION/TRAINING PROGRAM

## 2025-06-16 PROCEDURE — 87086 URINE CULTURE/COLONY COUNT: CPT

## 2025-06-16 PROCEDURE — 81001 URINALYSIS AUTO W/SCOPE: CPT

## 2025-06-16 PROCEDURE — 87077 CULTURE AEROBIC IDENTIFY: CPT

## 2025-06-16 PROCEDURE — 80048 BASIC METABOLIC PNL TOTAL CA: CPT

## 2025-06-16 PROCEDURE — 99232 SBSQ HOSP IP/OBS MODERATE 35: CPT | Performed by: NURSE PRACTITIONER

## 2025-06-16 PROCEDURE — A9270 NON-COVERED ITEM OR SERVICE: HCPCS | Performed by: STUDENT IN AN ORGANIZED HEALTH CARE EDUCATION/TRAINING PROGRAM

## 2025-06-16 PROCEDURE — 99233 SBSQ HOSP IP/OBS HIGH 50: CPT | Performed by: STUDENT IN AN ORGANIZED HEALTH CARE EDUCATION/TRAINING PROGRAM

## 2025-06-16 RX ORDER — ACETAMINOPHEN 325 MG/1
650 TABLET ORAL EVERY 6 HOURS
Status: DISCONTINUED | OUTPATIENT
Start: 2025-06-16 | End: 2025-06-19 | Stop reason: HOSPADM

## 2025-06-16 RX ADMIN — Medication 1000 UNITS: at 05:45

## 2025-06-16 RX ADMIN — OXYCODONE HYDROCHLORIDE 10 MG: 10 TABLET ORAL at 05:48

## 2025-06-16 RX ADMIN — CEFTRIAXONE SODIUM 2000 MG: 10 INJECTION, POWDER, FOR SOLUTION INTRAVENOUS at 12:09

## 2025-06-16 RX ADMIN — Medication 500 MG: at 08:31

## 2025-06-16 RX ADMIN — ACETAMINOPHEN 650 MG: 325 TABLET ORAL at 16:46

## 2025-06-16 RX ADMIN — OXYCODONE HYDROCHLORIDE 10 MG: 10 TABLET ORAL at 20:02

## 2025-06-16 RX ADMIN — Medication 5 MG: at 20:03

## 2025-06-16 RX ADMIN — TAMSULOSIN HYDROCHLORIDE 0.4 MG: 0.4 CAPSULE ORAL at 08:31

## 2025-06-16 RX ADMIN — OXYCODONE HYDROCHLORIDE 10 MG: 10 TABLET ORAL at 08:52

## 2025-06-16 RX ADMIN — OXYCODONE HYDROCHLORIDE 10 MG: 10 TABLET ORAL at 00:42

## 2025-06-16 RX ADMIN — DOCUSATE SODIUM 50 MG AND SENNOSIDES 8.6 MG 2 TABLET: 8.6; 5 TABLET, FILM COATED ORAL at 16:46

## 2025-06-16 RX ADMIN — ENOXAPARIN SODIUM 40 MG: 100 INJECTION SUBCUTANEOUS at 16:47

## 2025-06-16 RX ADMIN — ACETAMINOPHEN 650 MG: 325 TABLET ORAL at 12:09

## 2025-06-16 RX ADMIN — Medication 100 MG: at 05:45

## 2025-06-16 RX ADMIN — OXYCODONE HYDROCHLORIDE 10 MG: 10 TABLET ORAL at 16:47

## 2025-06-16 RX ADMIN — DEXTROAMPHETAMINE SACCHARATE, AMPHETAMINE ASPARTATE, DEXTROAMPHETAMINE SULFATE AND AMPHETAMINE SULFATE 15 MG: 2.5; 2.5; 2.5; 2.5 TABLET ORAL at 05:45

## 2025-06-16 ASSESSMENT — PAIN DESCRIPTION - PAIN TYPE
TYPE: ACUTE PAIN

## 2025-06-16 ASSESSMENT — ENCOUNTER SYMPTOMS
SHORTNESS OF BREATH: 1
NAUSEA: 0

## 2025-06-16 NOTE — PROGRESS NOTES
Palliative Care    Room: S525-02    HPI:   Barbra العراقي is a 80 y.o. female with medical history significant for bilateral hip arthroplasties, surgical fixation of bilateral periprosthetic femur fractures last of which was in 2023, brought in by EMS 6/12/2025 following GLF with resultant right hip pain.  CT imaging noted subtle acute fracture of anterior superior aspect of right greater trochanter.  CT of knee with no evidence of acute fracture.  Who recommended nonoperative management with weightbearing as tolerated.  Urinary catheter was placed for urinary retention.  Patient was evaluated by orthopedic surgery.  Patient requesting discharge to [facility] ([facility]    REVIEW OF SYSTEMS:  Positive for pain. Positive for fatigue.       PHYSICAL EXAM:  Physical Exam  Neurological:      Mental Status: She is oriented to person, place, and time.      Comments: Oriented to event   Psychiatric:         Mood and Affect: Mood is anxious.      Comments: Requesting visit from  - multiple orders for spiritual care visit are in place.  VO LTE message sent to hospital .         Discussion/Plan   #Advance care planning  Followed up on care planning discussion completed day of consultation 6/13/2025.  Patient deferred advance directive and POLST form.  She reports she is in pain and somewhat anxious.  She would like to talk with a .  Multiple orders placed VOLE message sent to hospital  for follow-up.  Patient deferred/declined advance care planning discussion today.  Updated bedside nurse.  Confirmed that representative from [facility] has resources to assist patient with advanced directive and POLST form if we are unable to complete this admission.    Interval diagnostic studies and medical documentation entries pertinent to this case were reviewed independently by me. This patient has at least one acute or chronic illness or injury that poses a threat to life or  "bodily function. This patient suffers from a high risk of morbidity from additional invasive diagnostic testing or intensive treatment. Discussion of recommendations and coordination of care undertaken with primary provider/treatment team.      Nel \"Mago\" ELAINE Batista, [facility]  Inpatient Palliative Care (service hours Mon-Fri 8AM - 5PM)  [phone]      "

## 2025-06-16 NOTE — PROGRESS NOTES
"Received alert and oriented x 3. Check vitals sign and recorded accordingly and due med given per MAR. Monitor sign and symptoms of respiratory distress and treatment given accordingly per MAR.Medicated per MAR and reassessed every 2 hours per protocol. Call light within reach. Bed alarm in placed. Needs attended. Continue to monitor.BP (!) 164/86 Comment: Rn aware  Pulse 66   Temp 36.4 °C (97.6 °F) (Temporal)   Resp 16   Ht 1.651 m (5' 5\")   Wt 70.6 kg (155 lb 10.3 oz)   SpO2 96%   BMI 25.90 kg/m² . Refused turning at times and mepilex heels to be applied.   "

## 2025-06-16 NOTE — DISCHARGE PLANNING
Case Management Discharge Planning    Admission Date: 6/12/2025  GMLOS: 2.8  ALOS: 4    6-Clicks ADL Score: 16  6-Clicks Mobility Score: 10  PT and/or OT Eval ordered: Yes  Post-acute Referrals Ordered: Yes  Post-acute Choice Obtained: Yes  Has referral(s) been sent to post-acute provider:  Yes      Anticipated Discharge Dispo: Discharge Disposition: D/T to [facility]  Discharge Address: [address] [place] [address] [address] [facility] [place] [address]    DME Needed: No    Action(s) Taken: Choice obtained    SW met with patient at bedside to discuss post-acute placement choice. Patient asked why is she not a candidate for [facility] as she has been there before. SW explained that [facility]. Patient verbalized understanding, states she is going to switch back to Medicare once that's available. Patient states wanting to send referral to [facility]. If rehab is unable to accept her, ok with SNF placement. States she would prefer SNF placement closest to home. Gave choice for 1) [facility], 2) [facility], and 3) [facility] of the current accepting SNFs. Patient acknowledges understanding that authorization may most likely take up to four days. Patient asked this SW to sign consent forms as she is partially blind. SW reached out to [facility] is verify if PMR consult can still occur as patient is preferring rehab placement over SNF. Awaiting response.    SW met with patient at bedside to discuss options if [facility] were to decline/not respond with appropriate time. Per patient, ok to start auth with a SNF if [facility] is unable to give answer by the end of the day.    SW met with patient at bedside to discuss outcome of [facility] response. Patient verbalized understanding. Is ok with auth to start with [facility].    Escalations Completed: None    Medically Clear: Yes    Next Steps: F/U with medical team to discuss discharge needs and barriers. F/U with [facility].    Barriers to Discharge: Pending Placement and Pending Insurance Authorization    Is the patient up for discharge tomorrow: No

## 2025-06-16 NOTE — CONSULTS
Physical Medicine and Rehabilitation Consultation              Date of initial consultation: 6/16/2025  Requesting provider: ordered by Malcolm Adler M.D. at 06/16/25 0947    Consulting provider: Iesha Carrillo D.O.  Reason for consultation: assess for acute inpatient rehab appropriateness  LOS: 4 Day(s)    Chief complaint: RLE pain after GLF     HPI: The patient is a 80 y.o.  female with a past medical history of prior ORIF of R hip 8/29/23 and adderall use ;  who presented on 6/12/2025  4:11 AM with RLE pain after GLF. Per documentation, patient had a fall at home landing on her right side. Patient upon eval in the ED, CT hip showed a possible subtle acute fracture of the anterior superior aspect of the right greater trochanter. Ortho was consulted, recommending non op management and WBAT RLE. Hospital course has been notable for urinary retention, renal US negative for hydronephrosis. Patient has not been able to tolerate gait with therapy.     Patient seen and examined at bedside, patient reports the pain meds make her feels like she needs to keep her eyes closed. Is only using oxycodone, will add scheduled tylenol.      Social Hx:  Patient lives alone in a 2 story house with elevator access , reports she does not want her daughter involved in her discharge support plan. Reports she has friend, but they can only help with things like rides and groceries.   0 ALBERT  At prior level of function Mod I with SPC     Tobacco: Denied   Alcohol: Denied   Drugs: Denied     THERAPY:  Restrictions: Fall Risk,  WBAT RLE   PT: Functional mobility   6/13 Mod A bed mobility, Mod A sit to stand, unable to participate in transfers or gait     OT: ADLs  6/13 Min A upper body dressing, Max A lower body dressing, Max A toileting, Mod A sit to stand, unable to participate in transfers     SLP:   None     IMAGING:  US-RENAL  Narrative: 6/14/2025 7:12 AM    HISTORY/REASON FOR EXAM:  Urine retention    TECHNIQUE/EXAM  "DESCRIPTION:  Renal ultrasound.    COMPARISON:  None    FINDINGS:    The right kidney measures 10.40 cm.  The right kidney appears normal in contour and parenchymal echotexture. The corticomedullary differentiation is preserved. The right renal collecting system is not dilated. No hydronephrosis. There are no renal   calculi.    The left kidney measures 10.90 cm. The left kidney appears normal in contour and parenchymal echotexture. The corticomedullary differentiation is preserved. The left renal collecting system is not dilated. No hydronephrosis. There are no renal calculi.    The bladder is decompressed by a Arellano catheter.  Impression: No evidence of solid renal mass or hydronephrosis.        PROCEDURES:  None     PMH:  Past Medical History[1]    PSH:  Past Surgical History[2]    FHX:  Non-pertinent to today's issues  No family history on file.    Medications:  Current Facility-Administered Medications   Medication Dose    thiamine (Vitamin B-1) tablet 100 mg  100 mg    tamsulosin (Flomax) capsule 0.4 mg  0.4 mg    melatonin tablet 5 mg  5 mg    enoxaparin (Lovenox) inj 40 mg  40 mg    oxyCODONE immediate-release (Roxicodone) tablet 5 mg  5 mg    Or    oxyCODONE immediate release (Roxicodone) tablet 10 mg  10 mg    Or    HYDROmorphone (Dilaudid) injection 0.5 mg  0.5 mg    senna-docusate (Pericolace Or Senokot S) 8.6-50 MG per tablet 2 Tablet  2 Tablet    And    polyethylene glycol/lytes (Miralax) Packet 1 Packet  1 Packet    hydrALAZINE (Apresoline) injection 10 mg  10 mg    ondansetron (Zofran) syringe/vial injection 4 mg  4 mg    Or    ondansetron (Zofran ODT) dispertab 4 mg  4 mg    calcium carbonate (Os-Cuong 500) tablet 500 mg  500 mg    vitamin D3 (Cholecalciferol) tablet 1,000 Units  1,000 Units    amphetamine-dextroamphetamine (Adderall) tablet 15 mg  15 mg       Allergies:  Allergies[3]      Physical Exam:  Vitals: /89   Pulse 85   Temp 36.7 °C (98 °F) (Temporal)   Resp 15   Ht 1.651 m (5' 5\") " "  Wt 70.6 kg (155 lb 10.3 oz)   SpO2 93%   Gen: NAD, laying comfortably in bed   Head:  NC/AT  Eyes/ Nose/ Mouth: PERRLA, moist mucous membranes  Cardio: RRR, good distal perfusion, warm extremities  Pulm: normal respiratory effort, no cyanosis, on RA   Abd: Soft NTND, negative borborygmi   Ext: No peripheral edema. No calf tenderness. No clubbing.    Mental status:  A&Ox4 (person, place, date, situation) answers questions appropriately follows commands, but appears fatigued   Speech: fluent, no aphasia or dysarthria    CRANIAL NERVES:  2,3: visual acuity grossly intact, PERRL  3,4,6: EOMI bilaterally, no nystagmus or diplopia  5: sensation intact to light touch bilaterally and symmetric  7: no facial asymmetry  8: hearing grossly intact      Motor:      Upper Extremity  Myotome R L   Shoulder flexion C5 5/5 5/5   Elbow flexion C5 5/5 5/5   Wrist extension C6 5/5 5/5   Elbow extension C7 5/5 5/5   Finger flexion C8 5/5 5/5   Finger abduction T1 5/5 5/5     Lower Extremity Myotome R L   Hip flexion L2 2/5 5/5   Knee extension L3 2/5 5/5   Ankle dorsiflexion L4 4/5 5/5   Toe extension L5 4/5 5/5   Ankle plantarflexion S1 5/5 5/5       Negative Pronator drift bilaterally    Sensory:   intact to light touch through out    DTRs: 2+ in bilateral  biceps  No clonus at bilateral ankles  Negative Pritchard b/l     Tone: no spasticity noted    Coordination:   intact fine motor with fingers bilaterally      Labs: Reviewed and significant for   No results for input(s): \"RBC\", \"HEMOGLOBIN\", \"HEMATOCRIT\", \"PLATELETCT\", \"PROTHROMBTM\", \"APTT\", \"INR\", \"IRON\", \"FERRITIN\", \"TOTIRONBC\" in the last 72 hours.  Recent Labs     06/15/25  0540 06/15/25  1617 06/16/25  0137   SODIUM 130* 132* 131*   POTASSIUM 4.1 4.1 4.1   CHLORIDE 99 102 98   CO2 22 21 21   GLUCOSE 113* 121* 136*   BUN 14 14 16   CREATININE 0.54 0.55 0.66   CALCIUM 8.1* 8.3* 8.4*     Recent Results (from the past 24 hours)   Basic Metabolic Panel    Collection Time: " 06/15/25  4:17 PM   Result Value Ref Range    Sodium 132 (L) 135 - 145 mmol/L    Potassium 4.1 3.6 - 5.5 mmol/L    Chloride 102 96 - 112 mmol/L    Co2 21 20 - 33 mmol/L    Glucose 121 (H) 65 - 99 mg/dL    Bun 14 8 - 22 mg/dL    Creatinine 0.55 0.50 - 1.40 mg/dL    Calcium 8.3 (L) 8.5 - 10.5 mg/dL    Anion Gap 9.0 7.0 - 16.0   ESTIMATED GFR    Collection Time: 06/15/25  4:17 PM   Result Value Ref Range    GFR (CKD-EPI) 91 >60 mL/min/1.73 m 2   PHOSPHORUS    Collection Time: 06/16/25  1:37 AM   Result Value Ref Range    Phosphorus 3.6 2.5 - 4.5 mg/dL   MAGNESIUM    Collection Time: 06/16/25  1:37 AM   Result Value Ref Range    Magnesium 1.8 1.5 - 2.5 mg/dL   Basic Metabolic Panel    Collection Time: 06/16/25  1:37 AM   Result Value Ref Range    Sodium 131 (L) 135 - 145 mmol/L    Potassium 4.1 3.6 - 5.5 mmol/L    Chloride 98 96 - 112 mmol/L    Co2 21 20 - 33 mmol/L    Glucose 136 (H) 65 - 99 mg/dL    Bun 16 8 - 22 mg/dL    Creatinine 0.66 0.50 - 1.40 mg/dL    Calcium 8.4 (L) 8.5 - 10.5 mg/dL    Anion Gap 12.0 7.0 - 16.0   ESTIMATED GFR    Collection Time: 06/16/25  1:37 AM   Result Value Ref Range    GFR (CKD-EPI) 87 >60 mL/min/1.73 m 2         ASSESSMENT:  Patient is a 80 y.o. female admitted with Right periprosthetic fracture     C Code / Diagnosis to Support: 0008.11 - Orthopaedic Disorders: Status Post Unilateral Hip Fracture  See DISPO details below for recommendations on appropriate level of rehab for this diagnosis.    Barriers to transfer include: Insurance authorization, TCCs to verify disposition, medical clearance and bed availability     Assessment and Plan:  Right periprosthetic fracture   - sustained in GLF   - CT hip noted to have subtle acute fracture of the anterior superior aspect of the right greater trochanter   - Ortho consulted, recommending non op management   - WBAT RLE   - continues to require PT/OT     Pain  - utilizing oxycodone PRN approx TID   - will add scheduled tylenol     Urinary  retention   - torres placed     - on flomax     Bowel  Last BM: 06/16/25  - on scheduled bowel meds     DISPO:  - patient is currently functioning below their level of baseline, recommend post acute rehab  - patient is not a candidate for IRF level therapy due to lack of DC support   - recommend SNF level therapy for prolonged access to rehab until patient is safe to DC home alone at a MOD I level   - PM&R will sign off       Medical Complexity:  Right periprosthetic fracture   Urinary retention   Impaired mobility and ADLs    DVT PPX: Lovenox       Thank you for allowing us to participate in the care of this patient.     Patient was seen for >80 minutes on unit/floor of which > 50% of time was spent on counseling and coordination of care regarding the above, including prognosis, risk reduction, benefits of treatment, and options for next stage of care.    Iesha Granger, DO   Physical Medicine and Rehabilitation     Please note that this dictation was created using voice recognition software. I have made every reasonable attempt to correct obvious errors, but there may be errors of grammar and possibly content that I did not discover before finalizing the note.                 [1] History reviewed. No pertinent past medical history.  [2]   Past Surgical History:  Procedure Laterality Date    ORIF, FRACTURE, FEMUR Right 8/29/2023    Procedure: ORIF, FRACTURE, FEMUR;  Surgeon: Giorgio Galvan M.D.;  Location: SURGERY [facility];  Service: Orthopedics   [3]   Allergies  Allergen Reactions    Ampicillin Unspecified     Hazy, bloated    Keflex [Cephalexin] Unspecified     Drowsy, altered

## 2025-06-16 NOTE — DIETARY
"Nutrition Services: Initial Assessment     Day 4 of admit. Barbra العراقي is an 80 y.o. 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  BMI Classification: WNL for age     Wt Readings from Last 6 Encounters:   06/15/25 70.6 kg (155 lb 10.3 oz)   09/10/23 61.2 kg (135 lb)   08/28/23 59 kg (130 lb)     Objective:   Admitted with fall and leg pain.  Pertinent Medical Hx: none on file  Pertinent Labs: Sodium 131, Glucose 136  Pertinent Meds: Os-Cuong, Melatonin, Pericolace, Thiamine, Vitamin D3  Skin/Wounds:  no skin breakdown noted  Food Allergies: none noted  Last BM: 06/16/25 per flowsheets    Current Diet Order:  Regular diet  PO intake per ADL flow sheet has been: <%    Subjective:   Patient reported UBW: 125 lbs  Dietary Recall/Energy Intake: 50% or > This indicates Sufficient energy intake.   I met with pt at bedside today. She appeared nourished. Moderate muscle wasting and moderate fat loss evidenced. Pt shared that she was eating well up until admit. She stated her appetite has been lower than what she was eating at home. PO has been averaging 50% for meals.    Nutrition Focused Physical Exam (NFPE)  Weight Loss: none  Muscle Mass: Moderate Wasting at temporal region, brow bone, clavicels and shoulders  Subcutaneous Fat: Moderate Loss at orbitals, buccals and zygomatic arches  Fluid Accumulation: none   Reduced  Strength: N/A in acute care setting.    Nutrition Diagnosis:      Inadequate Oral Intake related to hip pain as evidenced by " reduced PO intake.      Moderate Malnutrition in context of Chronic Illness related to debility as evidenced by physical markers for moderate fat loss and moderate muscle wasting.      RD notified provider via flow sheet.    Nutrition Interventions:      Continue Regular diet.  Recommend Ensure Plus High Protein (provides 350 calories) 20 g protein per 8 fl oz) BID  Patient aware of active plan of care as appropriate.     Nutrition Monitoring and Evaluation:      Monitor nutrition POC, goal for 50% intake from meals and supplements.  Additional fluids per MD/DO  Monitor vital signs pertinent to nutrition.    RD following and will provide updated recommendations as indicated.    Randi Nair R.D.                                       [facility]/AND CRITERIA FOR MALNUTRITION

## 2025-06-16 NOTE — HOSPITAL COURSE
80 y.o. female prior orthopedic hip surgeries with a prior right proximal femur periprosthetic fracture with prior open reduction internal fixation on 8/29/2023 presented with right hip pain after she tripped and fallen. Subsequent CT hip noted with subtle acute fracture of anterior superior aspect of right greater trochanter.. CT knee with no evidence of acute fracture. Orthopedist Dr. Hidalgo evaluated and recommended nonoperative management with weight bearing as tolerated.  Patient was noted to have reported dysuria and foul-smelling urine, urinary studies consistent with UTI and so patient started on IV ceftriaxone, cultures were collected.  Arellano was placed for urinary retention.  She failed a voiding trial 6/16/2025.  PT/OT recommended SNF.  Per PMR she is not a candidate for IRF.    While admitted, patient remained hemodynamically stable and clinically well.  Urinary symptoms improved.  Pain controlled on regimen.  She did have a small lesion in her right buttock with vesicular appearance concerning for shingles initially.  VZV PCR was collected and still in process however on further investigation shingles is less likely as she did not have any symptoms associated with this such as burning/tingling/pain/pruritus and the lesion remained stable.  Could be irritant dermatitis as she was being wiped whenever she had a bowel movement.  Regarding her UTI, urine cultures eventually grew Proteus mirabilis.  Patient transition to Augmentin based on susceptibilities.  She did a trial of void which she failed and so her Arellano was reinserted.  Urology referral outpatient placed.

## 2025-06-16 NOTE — PROGRESS NOTES
Hospital Medicine Daily Progress Note    Date of Service  6/16/2025    Chief Complaint  Barbra العراقي is a 80 y.o. female admitted 6/12/2025 with right hip pain     Hospital Course    80 y.o. female prior orthopedic hip surgeries with a prior right proximal femur periprosthetic fracture with prior open reduction internal fixation on 8/29/2023 presented with right hip pain after she tripped and fallen.  Subsequent CT hip noted with subtle acute fracture of anterior superior aspect of right greater trochanter..  CT knee with no evidence of acute fracture.  Orthopedist Dr. Hidalgo evaluated and recommended nonoperative management with weight bearing as tolerated.  Arellano was placed for retention.  PT/OT recommended SNF.    Interval Problem Update    6/16/2025  Seen and examined at bedside  Vital stable  Lab with sodium 131 potassium 4.1, renal function stable, UA positive for UTI  Arellano placed for retention    Plan  Continue on IV Rocephin  Follow urine culture  Arellano catheter placed, voiding trial in 2 to 3 days ordered can be done at [facility]  Repeat BMP in a.m. to monitor electrolytes and renal function   repeat CBC in a.m. to monitor white count and hemoglobin  Requiring IV antibiotics, need close monitoring for toxicity  Requiring close monitoring for toxicity while on narcotics  Discussed with PMR .  Not a candidate for acute rehab  Need placement,  assisting  High risk of deterioration into sepsis .  Need close monitoring     Patient has a high medical complexity, complex decision making and is at high risk of complication, morbidity and mortality      Risk of decompensation from ongoing hyponatremia.  Need close monitoring with repeat labs    I have discussed this patient's plan of care and discharge plan at IDT rounds today with Case Management, Nursing, Nursing leadership, and other members of the IDT team.    Consultants/Specialty  orthopedics    Code Status  Full Code    Disposition  The patient  is not medically cleared for discharge to home or a post-acute facility.  Anticipate discharge to: skilled nursing facility    I have placed the appropriate orders for post-discharge needs.    Review of Systems  Review of Systems   Constitutional:  Positive for malaise/fatigue.   Respiratory:  Positive for shortness of breath.    Cardiovascular:  Positive for chest pain.   Gastrointestinal:  Negative for nausea.   Musculoskeletal:  Positive for joint pain.        Physical Exam  Temp:  [36.3 °C (97.3 °F)-37.2 °C (98.9 °F)] 37.2 °C (98.9 °F)  Pulse:  [66-87] 66  Resp:  [15-17] 17  BP: (127-164)/(50-89) 127/55  SpO2:  [93 %-96 %] 93 %    Physical Exam  Constitutional:       Appearance: She is ill-appearing.      Comments: Appeared frail on exam   Cardiovascular:      Rate and Rhythm: Normal rate and regular rhythm.      Pulses: Normal pulses.      Heart sounds: Normal heart sounds.   Pulmonary:      Effort: Pulmonary effort is normal. No respiratory distress.   Musculoskeletal:      Comments: Range of motion limited in right hip due to pain   Skin:     General: Skin is warm.   Neurological:      General: No focal deficit present.      Mental Status: She is alert.   Psychiatric:         Mood and Affect: Mood normal.         Fluids    Intake/Output Summary (Last 24 hours) at 6/16/2025 1549  Last data filed at 6/16/2025 1241  Gross per 24 hour   Intake 500 ml   Output --   Net 500 ml        Laboratory        Recent Labs     06/15/25  0540 06/15/25  1617 06/16/25  0137   SODIUM 130* 132* 131*   POTASSIUM 4.1 4.1 4.1   CHLORIDE 99 102 98   CO2 22 21 21   GLUCOSE 113* 121* 136*   BUN 14 14 16   CREATININE 0.54 0.55 0.66   CALCIUM 8.1* 8.3* 8.4*                     Imaging  US-RENAL   Final Result      No evidence of solid renal mass or hydronephrosis.      CT-KNEE W/O PLUS RECONS RIGHT   Final Result      1.  Suboptimal exam related to osteopenia.   2.  No evidence of acute fracture.   3.  Meniscal calcifications, likely  CPPD arthropathy.   4.  Mild subcutaneous edema.      CT-HIP W/O PLUS RECONS RIGHT   Final Result         1.  Possible subtle acute fracture of the anterior superior aspect of the right greater trochanter, limited evaluation due to metallic streak and scatter artifacts.   2.  Marked bladder distention, consider bladder outlet obstruction.   3.  Diverticulosis.         DX-FEMUR-2+ RIGHT   Final Result         1.  No radiographic evidence of acute traumatic injury.           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  I have encouraged her to increase p.o. intake  Repeat BMP in  a.m. to monitor renal function, electrolytes  PT OT recommended SNF.   [facility]  Palliative care discussed with the patient regarding goal of care.    Risk of decompensation from ongoing hyponatremia.  Need close monitoring with repeat labs    Acute UTI  Assessment & Plan  Foul and cloudy urine  Reports of dysuria  UA consistent with UTI  Continue IV antibiotic  Follow urine culture      Moderate protein-calorie malnutrition (HCC)  Assessment & Plan  Concern for malnutrition  Poor appetite, requested for protein supplement  evidenced by  muscle wasting .   Dietitian consulted  Monitor weight, prevent wt loss   Monitor electrolytes to assess risk for refeeding syndrome,      Hyponatremia  Assessment & Plan  Likely dehydration, unable to rule out SIADH  Repeat labs  If low will start on IV fluid and continue monitor sodium level    Bladder outlet obstruction  Assessment & Plan  Marked bladder distention on CT  Bladder scan noted with 1400 mL urine retention  Arellano catheter placed, added Flomax  Renal ultrasound with no hydronephrosis  Failed voiding trial  on 6/16/2025  Voiding trial in 2 to 3 days      ACP (advance care planning)  Assessment & Plan    Palliative care following for goal of care discussion  Patient would like to think about goal of care.                 VTE prophylaxis: lovenox    I have performed a physical exam and reviewed and updated ROS and Plan today (6/16/2025). In review of yesterday's note (6/15/2025), there are no changes except as documented above.

## 2025-06-16 NOTE — PROGRESS NOTES
4 Eyes Skin Assessment Completed by JOB PATRICK and JOB Lopez.    Skin assessment is primarily focused on high risk bony prominences. Pay special attention to skin beneath and around medical devices, high risk bony prominences, skin to skin areas and areas where the patient lacks sensation to feel pain and areas where the patient previously had breakdown.     Head (Occipital):  WDL   Ears (Under Medical Devices): WDL   Nose (Under Medical Devices): WDL   Mouth:  Missing teeth   Neck: WDL   Breast/Chest:  WDL   Shoulder Blades:  WDL   Spine:   WDL   (R) Arm/Elbow/Hand: Bruising    (L) Arm/Elbow/Hand: Bruising   Abdomen: WDL   Pannus/Groin:  WDL   Sacrum/Coccyx:   Pink and Blanching   (R) Ischial Tuberosity (Sit Bones):  WDL   (L) Ischial Tuberosity (Sit Bones):  WDL   (R) Leg:  Red and Brown   (L) Leg:  Red and Brown   (R) Heel:  Callous, red, blanching   (R) Foot/Toe: Red   (L) Heel: Callous, red, blanching   (L) Foot/Toe:  Pink and Red       DEVICES IN USE:   Respiratory Devices:  NA, patient on room air  Feeding Devices:  N/A   Lines & BP Monitoring Devices:  Peripheral IV    Orthopedic Devices:  N/A  Miscellaneous Devices:  N/A    PROTOCOL INTERVENTIONS:   Low Airloss Bed:  Already in place  Float Heels with Pillows:  Already in place  Q2 Turns with Pillows:  Already in place  Q2 Turns with Wedges:  Already in place    WOUND PHOTOS:   No, photos needed at this time    WOUND CONSULT:   N/A, no advanced wound care needs identified

## 2025-06-16 NOTE — CARE PLAN
The patient is   Problem: Pain - Standard  Goal: Alleviation of pain or a reduction in pain to the patient’s comfort goal  Outcome: Progressing     Problem: Knowledge Deficit - Standard  Goal: Patient and family/care givers will demonstrate understanding of plan of care, disease process/condition, diagnostic tests and medications  Outcome: Progressing     Problem: Fall Risk  Goal: Patient will remain free from falls  Outcome: Progressing     Problem: Skin Integrity  Goal: Skin integrity is maintained or improved  Outcome: Progressing       Shift Goals  Clinical Goals: pain control, increase mobility, [id number]  Patient Goals: rest and sleep  Family Goals: GUEIRTA    Progress made toward(s) clinical / shift goals:  Medicating for pain prn per MAR.  Q2 turns in place-intermittently refuses despite education. Unable to urinate. Arellano placed today for retention. Pt fearful of ambulating due to pain. Able to stand at side of bed and walk in place, declined to ambulate further. Pt is a high fall risk with all interventions in place including bed frame (ELDER) alarm, door sign, frequent rounding, education, call light in place and calls appropriately      Patient is not progressing towards the following goals:

## 2025-06-16 NOTE — DISCHARGE PLANNING
[facility]itional [facility] Coordination    Physiatry to consult to assist with discharge plan.

## 2025-06-16 NOTE — CARE PLAN
The patient is Stable - Low risk of patient condition declining or worsening    Shift Goals  Clinical Goals: skin integrity and free of fall  Patient Goals: rest and sleep  Family Goals: GUERITA    Progress made toward(s) clinical / shift goals:  free of fall within shift, q 2 turn and fall precaution continued. Bladder scan per protocol done.    Patient is not progressing towards the following goals:

## 2025-06-16 NOTE — ASSESSMENT & PLAN NOTE
Foul and cloudy urine  Reports of dysuria.  Also now with urinary retention that is new.  UA consistent with UTI.  UCX preliminary showed Proteus  Continue IV ceftriaxone pending culture update    6/18  Urine cultures with Proteus and susceptibilities have resulted.  Switch to Augmentin based on susceptibilities to complete antibiotic course.  Do voiding trial today.

## 2025-06-16 NOTE — DISCHARGE PLANNING
Spoke To: Sara  Agency/Facility Name: [facility]  Plan or Request: DPA confirmed referral received. Will go into review.      1405- Spoke To: Sara [phone]  Agency/Facility Name: [facility]  Plan or Request: DPA called to f/u on pending referral, per Sara, have not had time to review.    1432- Spoke To: Sara  Agency/Facility Name: [facility]  Plan or Request: After review of chart notes, declined.    1450- Spoke to: Efrem  Agency/Facility Name: [facility]   Plan or Request: Insurance auth to start.

## 2025-06-17 PROBLEM — L98.9 SKIN LESION: Status: ACTIVE | Noted: 2025-06-17

## 2025-06-17 LAB
ANION GAP SERPL CALC-SCNC: 11 MMOL/L (ref 7–16)
BUN SERPL-MCNC: 19 MG/DL (ref 8–22)
CALCIUM SERPL-MCNC: 8.6 MG/DL (ref 8.5–10.5)
CHLORIDE SERPL-SCNC: 102 MMOL/L (ref 96–112)
CO2 SERPL-SCNC: 22 MMOL/L (ref 20–33)
CREAT SERPL-MCNC: 0.67 MG/DL (ref 0.5–1.4)
ERYTHROCYTE [DISTWIDTH] IN BLOOD BY AUTOMATED COUNT: 43.1 FL (ref 35.9–50)
GFR SERPLBLD CREATININE-BSD FMLA CKD-EPI: 87 ML/MIN/1.73 M 2
GLUCOSE SERPL-MCNC: 115 MG/DL (ref 65–99)
HCT VFR BLD AUTO: 34.2 % (ref 37–47)
HGB BLD-MCNC: 10.9 G/DL (ref 12–16)
MAGNESIUM SERPL-MCNC: 1.9 MG/DL (ref 1.5–2.5)
MCH RBC QN AUTO: 29.7 PG (ref 27–33)
MCHC RBC AUTO-ENTMCNC: 31.9 G/DL (ref 32.2–35.5)
MCV RBC AUTO: 93.2 FL (ref 81.4–97.8)
PHOSPHATE SERPL-MCNC: 4.2 MG/DL (ref 2.5–4.5)
PLATELET # BLD AUTO: 200 K/UL (ref 164–446)
PMV BLD AUTO: 10.1 FL (ref 9–12.9)
POTASSIUM SERPL-SCNC: 4.5 MMOL/L (ref 3.6–5.5)
RBC # BLD AUTO: 3.67 M/UL (ref 4.2–5.4)
SODIUM SERPL-SCNC: 135 MMOL/L (ref 135–145)
WBC # BLD AUTO: 7.1 K/UL (ref 4.8–10.8)

## 2025-06-17 PROCEDURE — 87529 HSV DNA AMP PROBE: CPT

## 2025-06-17 PROCEDURE — 770006 HCHG ROOM/CARE - MED/SURG/GYN SEMI*

## 2025-06-17 PROCEDURE — 99233 SBSQ HOSP IP/OBS HIGH 50: CPT

## 2025-06-17 PROCEDURE — 36415 COLL VENOUS BLD VENIPUNCTURE: CPT

## 2025-06-17 PROCEDURE — A9270 NON-COVERED ITEM OR SERVICE: HCPCS | Performed by: PHYSICAL MEDICINE & REHABILITATION

## 2025-06-17 PROCEDURE — 97116 GAIT TRAINING THERAPY: CPT

## 2025-06-17 PROCEDURE — 700105 HCHG RX REV CODE 258: Performed by: STUDENT IN AN ORGANIZED HEALTH CARE EDUCATION/TRAINING PROGRAM

## 2025-06-17 PROCEDURE — 97530 THERAPEUTIC ACTIVITIES: CPT

## 2025-06-17 PROCEDURE — 83735 ASSAY OF MAGNESIUM: CPT

## 2025-06-17 PROCEDURE — 700111 HCHG RX REV CODE 636 W/ 250 OVERRIDE (IP): Performed by: STUDENT IN AN ORGANIZED HEALTH CARE EDUCATION/TRAINING PROGRAM

## 2025-06-17 PROCEDURE — 97535 SELF CARE MNGMENT TRAINING: CPT

## 2025-06-17 PROCEDURE — 99232 SBSQ HOSP IP/OBS MODERATE 35: CPT | Mod: 25 | Performed by: NURSE PRACTITIONER

## 2025-06-17 PROCEDURE — 700102 HCHG RX REV CODE 250 W/ 637 OVERRIDE(OP): Performed by: STUDENT IN AN ORGANIZED HEALTH CARE EDUCATION/TRAINING PROGRAM

## 2025-06-17 PROCEDURE — 700102 HCHG RX REV CODE 250 W/ 637 OVERRIDE(OP): Performed by: HOSPITALIST

## 2025-06-17 PROCEDURE — 80048 BASIC METABOLIC PNL TOTAL CA: CPT

## 2025-06-17 PROCEDURE — 85027 COMPLETE CBC AUTOMATED: CPT

## 2025-06-17 PROCEDURE — 700102 HCHG RX REV CODE 250 W/ 637 OVERRIDE(OP): Performed by: PHYSICAL MEDICINE & REHABILITATION

## 2025-06-17 PROCEDURE — 84100 ASSAY OF PHOSPHORUS: CPT

## 2025-06-17 PROCEDURE — 99497 ADVNCD CARE PLAN 30 MIN: CPT | Performed by: NURSE PRACTITIONER

## 2025-06-17 PROCEDURE — 700111 HCHG RX REV CODE 636 W/ 250 OVERRIDE (IP): Mod: JZ | Performed by: HOSPITALIST

## 2025-06-17 PROCEDURE — A9270 NON-COVERED ITEM OR SERVICE: HCPCS | Performed by: STUDENT IN AN ORGANIZED HEALTH CARE EDUCATION/TRAINING PROGRAM

## 2025-06-17 PROCEDURE — A9270 NON-COVERED ITEM OR SERVICE: HCPCS | Performed by: HOSPITALIST

## 2025-06-17 RX ADMIN — TAMSULOSIN HYDROCHLORIDE 0.4 MG: 0.4 CAPSULE ORAL at 08:51

## 2025-06-17 RX ADMIN — Medication 500 MG: at 08:51

## 2025-06-17 RX ADMIN — Medication 1000 UNITS: at 05:28

## 2025-06-17 RX ADMIN — CEFTRIAXONE SODIUM 2000 MG: 10 INJECTION, POWDER, FOR SOLUTION INTRAVENOUS at 05:26

## 2025-06-17 RX ADMIN — DEXTROAMPHETAMINE SACCHARATE, AMPHETAMINE ASPARTATE, DEXTROAMPHETAMINE SULFATE AND AMPHETAMINE SULFATE 15 MG: 2.5; 2.5; 2.5; 2.5 TABLET ORAL at 05:28

## 2025-06-17 RX ADMIN — ACETAMINOPHEN 650 MG: 325 TABLET ORAL at 05:28

## 2025-06-17 RX ADMIN — ENOXAPARIN SODIUM 40 MG: 100 INJECTION SUBCUTANEOUS at 17:14

## 2025-06-17 RX ADMIN — OXYCODONE HYDROCHLORIDE 10 MG: 10 TABLET ORAL at 13:22

## 2025-06-17 RX ADMIN — Medication 5 MG: at 21:00

## 2025-06-17 RX ADMIN — Medication 100 MG: at 05:28

## 2025-06-17 RX ADMIN — OXYCODONE HYDROCHLORIDE 10 MG: 10 TABLET ORAL at 20:06

## 2025-06-17 RX ADMIN — ACETAMINOPHEN 650 MG: 325 TABLET ORAL at 00:30

## 2025-06-17 RX ADMIN — ACETAMINOPHEN 650 MG: 325 TABLET ORAL at 11:13

## 2025-06-17 RX ADMIN — ACETAMINOPHEN 650 MG: 325 TABLET ORAL at 17:14

## 2025-06-17 ASSESSMENT — GAIT ASSESSMENTS
DEVIATION: BRADYKINETIC;ANTALGIC
DISTANCE (FEET): 3
GAIT LEVEL OF ASSIST: MODERATE ASSIST
ASSISTIVE DEVICE: FRONT WHEEL WALKER

## 2025-06-17 ASSESSMENT — COGNITIVE AND FUNCTIONAL STATUS - GENERAL
TURNING FROM BACK TO SIDE WHILE IN FLAT BAD: A LOT
MOBILITY SCORE: 12
EATING MEALS: A LITTLE
MOVING FROM LYING ON BACK TO SITTING ON SIDE OF FLAT BED: A LITTLE
MOVING TO AND FROM BED TO CHAIR: A LOT
WALKING IN HOSPITAL ROOM: A LOT
DAILY ACTIVITIY SCORE: 14
SUGGESTED CMS G CODE MODIFIER MOBILITY: CL
STANDING UP FROM CHAIR USING ARMS: A LOT
SUGGESTED CMS G CODE MODIFIER DAILY ACTIVITY: CK
CLIMB 3 TO 5 STEPS WITH RAILING: TOTAL
TOILETING: TOTAL
HELP NEEDED FOR BATHING: A LOT
DRESSING REGULAR LOWER BODY CLOTHING: A LOT
PERSONAL GROOMING: A LITTLE
DRESSING REGULAR UPPER BODY CLOTHING: A LITTLE

## 2025-06-17 ASSESSMENT — PAIN DESCRIPTION - PAIN TYPE
TYPE: ACUTE PAIN

## 2025-06-17 ASSESSMENT — ENCOUNTER SYMPTOMS
SHORTNESS OF BREATH: 1
NAUSEA: 0

## 2025-06-17 NOTE — PROGRESS NOTES
Bedside report received. Assessment completed. Pt is A&O x 4 and reports 8/10 pain PRN administered. Pt is on RAL. Bed alarm in place and call light in reach.

## 2025-06-17 NOTE — PROGRESS NOTES
Hospital Medicine Daily Progress Note    Date of Service  6/17/2025    Chief Complaint  Barbra العراقي is a 80 y.o. female admitted 6/12/2025 with right hip pain     Hospital Course    80 y.o. female prior orthopedic hip surgeries with a prior right proximal femur periprosthetic fracture with prior open reduction internal fixation on 8/29/2023 presented with right hip pain after she tripped and fallen. Subsequent CT hip noted with subtle acute fracture of anterior superior aspect of right greater trochanter.. CT knee with no evidence of acute fracture. Orthopedist Dr. Hidalgo evaluated and recommended nonoperative management with weight bearing as tolerated.  Patient noted to have reported dysuria and foul-smelling urine, urinary studies consistent with UTI and so patient started on IV antibiotics, cultures were collected.  Arellano was placed for urinary retention.  She failed a voiding trial 6/16/2025.  PT/OT recommended SNF.  Per PMR she is not a candidate for IRF.      Interval Problem Update    6/17  Patient new to me today.  HDS, on RA.  NAEO.  On ceftriaxone.  Urinalysis 6/16 updated to Proteus mirabilis, susceptibilities pending.  WBC 7.1, hemoglobin 10.9, platelet 200.  Metabolic panel reviewed -sodium 135, improved from 134.  Rest of electrolytes and kidney function WNL.    Patient feels well overall today.  No fevers or chills, denies any dysuria.  Does still have a Arellano in place.  Solitary group of vesicle-like lesions at the back, see media.  Not pruritic, tender per patient.  Not dermatomal.    Check VZV PCR.  Continue ceftriaxone, follow-up on urine culture susceptibilities.  Remains on Arellano. Continue flomax    I have discussed this patient's plan of care and discharge plan at IDT rounds today with Case Management, Nursing, Nursing leadership, and other members of the IDT team.    Consultants/Specialty  orthopedics    Code Status  Full Code    Disposition    I have placed the appropriate orders for  post-discharge needs.    Review of Systems  Review of Systems   Constitutional:  Positive for malaise/fatigue.   Respiratory:  Positive for shortness of breath.    Cardiovascular:  Positive for chest pain.   Gastrointestinal:  Negative for nausea.   Musculoskeletal:  Positive for joint pain.        Physical Exam  Temp:  [36.1 °C (96.9 °F)-36.5 °C (97.7 °F)] 36.2 °C (97.1 °F)  Pulse:  [66-89] 89  Resp:  [16-18] 16  BP: (113-125)/(46-55) 123/55  SpO2:  [90 %-95 %] 95 %    Physical Exam  Constitutional:       Appearance: She is ill-appearing.      Comments: Appeared frail on exam   Cardiovascular:      Rate and Rhythm: Normal rate and regular rhythm.      Pulses: Normal pulses.      Heart sounds: Normal heart sounds.   Pulmonary:      Effort: Pulmonary effort is normal. No respiratory distress.   Musculoskeletal:      Comments: Range of motion limited in right hip due to pain   Skin:     General: Skin is warm.   Neurological:      General: No focal deficit present.      Mental Status: She is alert.   Psychiatric:         Mood and Affect: Mood normal.         Fluids    Intake/Output Summary (Last 24 hours) at 6/17/2025 2026  Last data filed at 6/17/2025 1600  Gross per 24 hour   Intake 480 ml   Output 1250 ml   Net -770 ml        Laboratory  Recent Labs     06/17/25  0400   WBC 7.1   RBC 3.67*   HEMOGLOBIN 10.9*   HEMATOCRIT 34.2*   MCV 93.2   MCH 29.7   MCHC 31.9*   RDW 43.1   PLATELETCT 200   MPV 10.1       Recent Labs     06/15/25  1617 06/16/25  0137 06/17/25  0400   SODIUM 132* 131* 135   POTASSIUM 4.1 4.1 4.5   CHLORIDE 102 98 102   CO2 21 21 22   GLUCOSE 121* 136* 115*   BUN 14 16 19   CREATININE 0.55 0.66 0.67   CALCIUM 8.3* 8.4* 8.6                     Imaging  US-RENAL   Final Result      No evidence of solid renal mass or hydronephrosis.      CT-KNEE W/O PLUS RECONS RIGHT   Final Result      1.  Suboptimal exam related to osteopenia.   2.  No evidence of acute fracture.   3.  Meniscal calcifications, likely  CPPD arthropathy.   4.  Mild subcutaneous edema.      CT-HIP W/O PLUS RECONS RIGHT   Final Result         1.  Possible subtle acute fracture of the anterior superior aspect of the right greater trochanter, limited evaluation due to metallic streak and scatter artifacts.   2.  Marked bladder distention, consider bladder outlet obstruction.   3.  Diverticulosis.         DX-FEMUR-2+ RIGHT   Final Result         1.  No radiographic evidence of acute traumatic injury.           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 Hidalgo evaluated and recommended nonoperative management with weight bearing as tolerated.  PT OT recommended SNF.   [facility]  Palliative care discussed with the patient regarding goal of care.      Acute UTI  Assessment & Plan  Foul and cloudy urine  Reports of dysuria.  Also now with urinary retention that is new.  UA consistent with UTI.  UCX preliminary showed Proteus  Continue IV ceftriaxone pending culture update      Bladder outlet obstruction  Assessment & Plan  Marked bladder distention on CT  Bladder scan noted with 1400 mL urine retention  Arellano catheter placed, added Flomax  Renal ultrasound with no hydronephrosis  Failed voiding trial on 6/16/2025  Urology follow-up outpatient      Skin lesion  Assessment & Plan  Check VZV PCR    Moderate protein-calorie malnutrition (HCC)  Assessment & Plan  Concern for malnutrition  Poor appetite, requested for protein supplement  evidenced by  muscle wasting .   Dietitian consulted  Monitor weight, prevent wt loss   Monitor electrolytes to assess risk for refeeding syndrome,      Hyponatremia  Assessment & Plan  Likely dehydration, unable to rule out SIADH  Repeat labs  If low will start on IV fluid and continue monitor sodium level    6/17  Improved    ACP (advance care planning)  Assessment & Plan    Palliative care  following for goal of care discussion  Patient would like to think about goal of care.                 VTE prophylaxis: lovenox    I have performed a physical exam and reviewed and updated ROS and Plan today (6/17/2025). In review of yesterday's note (6/16/2025), there are no changes except as documented above.    Greater than 51 minutes spent prepping to see patient (e.g. review of tests) obtaining and/or reviewing separately obtained history. Performing a medically appropriate examination and evaluation.  Counseling and educating the patient/family/caregiver.  Ordering medications, tests, or procedures.  Referring and communicating with other health care professionals.  Documenting clinical information in EPIC.  Independently interpreting results and communicating results to patient/family/caregiver.  Care coordination.

## 2025-06-17 NOTE — THERAPY
"Physical Therapy   Daily Treatment     Patient Name:  Barbra العراقي  Age:  80 y.o., Sex:  female  Medical Record #:  [id number]  Today's Date: 6/17/2025     Precautions  Medical: Fall Risk  Weight Bearing: Weight Bearing as Tolerated Right Lower Extremity    Assessment    Patient seen for follow up PT treatment session and demos improved strength and mobility.  She got to EOB with [facility] and was able to take steps from the EOB to the chair with ModA and 2 person assist w/FWW.  She will benefit from placement for further therapy at this time. Will continue to follow while in house.     Plan    Treatment Plan Status: Continue Current Treatment Plan  Type of Treatment: Bed Mobility, Gait Training, Neuro Re-Education / Balance, Self Care / Home Evaluation, Therapeutic Activities, Therapeutic Exercise  Treatment Frequency: 4 Times per Week  Treatment Duration: Until Therapy Goals Met    DC Equipment Recommendations: Unable to determine at this time  Discharge Recommendations: Recommend post-acute placement for additional physical therapy services prior to discharge home      Subjective    \"I'm really stiff, I haven't been out of the bed yet\"     Objective       06/17/25 1420   Precautions   Medical Fall Risk   Weight Bearing Weight Bearing as Tolerated Right Lower Extremity   Pain 0 - 10 Group   Location Back;Hip   Location Orientation Right   Pain Rating Scale (NPRS) 8   Therapist Pain Assessment Post Activity Pain Same as Prior to Activity;Nurse Notified;8   Cognition    Cognition / Consciousness X   Level of Consciousness Alert   Safety Awareness Impaired   New Learning Impaired   Attention Impaired   Comments anxious but pleasant and cooperative. Needs simple repeated cueing   Strength Lower Body   Lower Body Strength  X   Comments R LE grossly 3-/5, L LE grossly 3+/5   Sensation Lower Body   Lower Extremity Sensation   X   Comments baseline N/T in B feet   Sitting Lower Body Exercises   Sitting Lower Body Exercises Yes "   Ankle Pumps 2 sets of 10   Long Arc Quad 2 sets of 10   Neuro-Muscular Treatments   Neuro-Muscular Treatments Anterior weight shift;Compensatory Strategies;Postural Facilitation;Sequencing;Tactile Cuing;Verbal Cuing;Weight Shift Right;Weight Shift Left   Vision   Vision Comments macular degeneration, unable to read or see the TV   Other Treatments   Other Treatments Provided discussed DC recs and educated about pathologies of bedrest   Balance   Sitting Balance (Static) Fair   Sitting Balance (Dynamic) Fair   Standing Balance (Static) Poor +   Standing Balance (Dynamic) Poor   Weight Shift Sitting Fair   Weight Shift Standing Poor   Skilled Intervention Verbal Cuing;Sequencing;Tactile Cuing   Comments w/FWW   Bed Mobility    Supine to Sit Minimal Assist   Scooting Supervised   Skilled Intervention Compensatory Strategies;Verbal Cuing;Tactile Cuing;Sequencing   Comments cueing for sequencing for bed mobility, HOB slightly elevated   Gait Analysis   Gait Level Of Assist Moderate Assist   Assistive Device Front Wheel Walker   Distance (Feet) 3   # of Times Distance was Traveled 1   Deviation Bradykinetic;Antalgic   Weight Bearing Status WBAT R LE   Functional Mobility   Sit to Stand Moderate Assist   Bed, Chair, Wheelchair Transfer Moderate Assist   Mobility supine>EOB.STSx2>steps to chair   6 Clicks Assessment - How much HELP from from another person do you currently need... (If the patient hasn't done an activity recently, how much help from another person do you think he/she would need if he/she tried?)   Turning from your back to your side while in a flat bed without using bedrails? 2   Moving from lying on your back to sitting on the side of a flat bed without using bedrails? 3   Moving to and from a bed to a chair (including a wheelchair)? 2   Standing up from a chair using your arms (e.g., wheelchair, or bedside chair)? 2   Walking in hospital room? 2   Climbing 3-5 steps with a railing? 1   6 clicks Mobility  Score 12   Activity Tolerance   Sitting in Chair post session   Sitting Edge of Bed 10 min   Standing 1 min   Short Term Goals    Short Term Goal # 1 Pt will be able to perform supine <> sit with SPV in 6 visits to progress bed mobility   Goal Outcome # 1 Progressing as expected   Short Term Goal # 2 Pt will be able to perform STS with FWW and SPV in 6 visits to progress functional transfers   Goal Outcome # 2 Progressing as expected   Short Term Goal # 3 Pt will be able to perform stand step transfers with FWW and SPV in 6 visits to progress functional mobility   Goal Outcome # 3 Progressing as expected   Short Term Goal # 4 Pt will be able to ambulate 50ft with FWW and Diomedes in 6 visits to progress functional gait   Goal Outcome # 4 Goal not met   Physical Therapy Treatment Plan   Physical Therapy Treatment Plan Continue Current Treatment Plan   Anticipated Discharge Equipment and Recommendations   DC Equipment Recommendations Unable to determine at this time   Discharge Recommendations Recommend post-acute placement for additional physical therapy services prior to discharge home     Macarena Joseph, PT, DPT, [facility]

## 2025-06-17 NOTE — CARE PLAN
The patient is Stable - Low risk of patient condition declining or worsening    Shift Goals  Clinical Goals: pain mgmt, palliative consult  Patient Goals: rest  Family Goals: GUERITA    Progress made toward(s) clinical / shift goals:     Problem: Pain - Standard  Goal: Alleviation of pain or a reduction in pain to the patient’s comfort goal  Outcome: Progressing  Note: Pain mgmt primarily with scheduled tylenol, PRN oxy x1.     Problem: Knowledge Deficit - Standard  Goal: Patient and family/care givers will demonstrate understanding of plan of care, disease process/condition, diagnostic tests and medications  Outcome: Progressing  Note: Cont'd IV abx pending sensitivities.

## 2025-06-17 NOTE — THERAPY
Occupational Therapy  Daily Treatment     Patient Name:  Barbra العراقي  Age:  80 y.o., Sex:  female  Medical Record #:  [id number]  Today's Date:  6/17/2025    Precautions  Medical: Fall Risk  Weight Bearing: Weight Bearing as Tolerated Right Lower Extremity    Assessment    Pt seen for OT tx, continues to require increased assist for ADLs from her stated baseline of functional independence. Pt is limited by bowel incontinence and requires maxA for clean up, maxA for LB clothing mgmt and modA for stand pivot transfer to chair. Pt appeared more confident and put forth more effort with two clinicians present to assist. Continue to recommend post-acute placement.    Plan    Treatment Plan Status: Continue Current Treatment Plan  Type of Treatment: Self Care / Activities of Daily Living, Therapeutic Exercises, Therapeutic Activity, Adaptive Equipment  Treatment Frequency: 3 Times per Week  Treatment Duration: Until Therapy Goals Met    DC Equipment Recommendations: Unable to determine at this time  Discharge Recommendations: Recommend post-acute placement for additional occupational therapy services prior to discharge home     Objective       06/17/25 1512   Precautions   Medical Fall Risk   Weight Bearing Weight Bearing as Tolerated Right Lower Extremity   Pain 0 - 10 Group   Therapist Pain Assessment During Activity;Nurse Notified   Cognition    Cognition / Consciousness X   Level of Consciousness Alert   Safety Awareness Impaired   New Learning Impaired   Attention Impaired   Comments pt fearful but cooperative, repeats similar complaints regarding vision impairment   Strength Upper Body   Upper Body Strength  X   Gross Strength Generalized Weakness, Equal Bilaterally.    Balance   Sitting Balance (Static) Fair   Sitting Balance (Dynamic) Fair   Standing Balance (Static) Poor +   Standing Balance (Dynamic) Poor   Weight Shift Sitting Fair   Weight Shift Standing Poor   Skilled Intervention Verbal Cuing;Sequencing;Tactile  Cuing   Comments FWW   Bed Mobility    Comments received sitting EOB with PT in attendance to assist with transfer   Activities of Daily Living   Eating Supervision   Grooming Supervision;Seated  (face wash and oral care in chair)   Upper Body Dressing Minimal Assist   Lower Body Dressing Maximal Assist   Toileting Total Assist   Skilled Intervention Verbal Cuing;Tactile Cuing   Comments incontinent of stool   How much help from another person does the patient currently need...   Putting on and taking off regular lower body clothing? 2   Bathing (including washing, rinsing, and drying)? 2   Toileting, which includes using a toilet, bedpan, or urinal? 1   Putting on and taking off regular upper body clothing? 3   Taking care of personal grooming such as brushing teeth? 3   Eating meals? 3   6 Clicks Daily Activity Score 14   Functional Mobility   Sit to Stand Moderate Assist   Bed, Chair, Wheelchair Transfer Moderate Assist   Mobility stand pivot with x2 assist   Activity Tolerance   Sitting in Chair 15+min   Sitting Edge of Bed 10min   Standing 1min   Short Term Goals   Short Term Goal # 1 Pt will complete LB dress with SPV and AE PRN   Goal Outcome # 1 Progressing as expected   Short Term Goal # 2 Pt will complete toileting ADL at SPV level   Goal Outcome # 2 Progressing as expected   Short Term Goal # 3 pt will complete standing grooming at sink with SPV   Goal Outcome # 3 Progressing as expected   Education Group   Education Provided Role of Occupational Therapist;Activities of Daily Living;Pathology of bedrest   Role of Occupational Therapist Patient Response Patient;Acceptance;Explanation;Verbal Demonstration   ADL Patient Response Patient;Acceptance;Explanation;Verbal Demonstration   Pathology of Bedrest Patient Response Patient;Acceptance;Explanation;Verbal Demonstration   Occupational Therapy Treatment Plan    O.T. Treatment Plan Continue Current Treatment Plan   Anticipated Discharge Equipment and  Recommendations   DC Equipment Recommendations Unable to determine at this time   Discharge Recommendations Recommend post-acute placement for additional occupational therapy services prior to discharge home   Interdisciplinary Plan of Care Collaboration   IDT Collaboration with  Nursing;Physical Therapist  (dovetail/overlap session to assist with transfer)   Patient Position at End of Therapy Seated;Chair Alarm On;Call Light within Reach;Tray Table within Reach;Phone within Reach

## 2025-06-17 NOTE — CARE PLAN
The patient is Stable - Low risk of patient condition declining or worsening    Shift Goals  Clinical Goals: Pt pain will remain < 5 after an intervention this shift  Patient Goals: Rest and pain control  Family Goals: N/A    Progress made toward(s) clinical / shift goals:    Problem: Pain - Standard  Goal: Alleviation of pain or a reduction in pain to the patient’s comfort goal  Description: Target End Date:  Prior to discharge or change in level of care    Document on Vitals flowsheet    1.  Document pain using the appropriate pain scale per order or unit policy  2.  Educate and implement non-pharmacologic comfort measures (i.e. relaxation, distraction, massage, cold/heat therapy, etc.)  3.  Pain management medications as ordered  4.  Reassess pain after pain med administration per policy  5.  If opiods administered assess patient's response to pain medication is appropriate per POSS sedation scale  6.  Follow pain management plan developed in collaboration with patient and interdisciplinary team (including palliative care or pain specialists if applicable)  Outcome: Progressing     Problem: Knowledge Deficit - Standard  Goal: Patient and family/care givers will demonstrate understanding of plan of care, disease process/condition, diagnostic tests and medications  Description: Target End Date:  1-3 days or as soon as patient condition allows    Document in Patient Education    1.  Patient and family/caregiver oriented to unit, equipment, visitation policy and means for communicating concern  2.  Complete/review Learning Assessment  3.  Assess knowledge level of disease process/condition, treatment plan, diagnostic tests and medications  4.  Explain disease process/condition, treatment plan, diagnostic tests and medications  Outcome: Progressing     Problem: Fall Risk  Goal: Patient will remain free from falls  Description: Target End Date:  Prior to discharge or change in level of care    Document interventions on  the [place] Fall Risk Assessment    1.  Assess for fall risk factors  2.  Implement fall precautions  Outcome: Progressing       Patient is not progressing towards the following goals:

## 2025-06-17 NOTE — ASSESSMENT & PLAN NOTE
Check VZV PCR -in process  Less likely shingles given absence of symptoms and overall stable appearance of the lesion.  Could be irritant contact dermatitis as patient has to be continuously wiped whenever she has a bowel movement.

## 2025-06-17 NOTE — PROGRESS NOTES
Palliative Care    Room: S525-02    HPI:   Barbra العراقي is a 80 y.o. female with medical history significant for bilateral hip arthroplasties, surgical fixation of bilateral periprosthetic femur fractures last of which was in 2023, brought in by EMS 6/12/2025 following GLF with resultant right hip pain.  CT imaging noted subtle acute fracture of anterior superior aspect of right greater trochanter.  CT of knee with no evidence of acute fracture.  Who recommended nonoperative management with weightbearing as tolerated.  Urinary catheter was placed for urinary retention.  Patient was evaluated by orthopedic surgery with plan for conservative management.     REVIEW OF SYSTEMS:  Negative for pain.     PHYSICAL EXAM:  Physical Exam  Constitutional:       General: She is not in acute distress.     Comments: Sitting up in bed eating lunch   Pulmonary:      Effort: No respiratory distress.   Neurological:      Mental Status: She is oriented to person, place, and time.      Comments: Oriented to event         Discussion/Plan   # Advance care planning  [place] back regarding spiritual care visit yesterday.  Patient reports she was able to speak with  which was very helpful.  Revisited CODE STATUS, advanced directive, and POLST form.  Patient stated she needs more time to think about it.  She stated she feels that she would only really need those documents if she was going into surgery.  She again stated today she likely would not want to be on a ventilator and that she would want her son to make healthcare decisions for her.  Reiterated education that advance directive could establish a power of  and patient could express her healthcare needs in the event of an emergency.  Discussed emergencies are not always predictable or preventable especially at an advanced age.  She would like to defer documents.  Confirm she can get assistance from her insurance [employer] or at [facility] to complete documents  "if she wishes.  Confirmed otherwise both of her children would be reached out to in the event of an emergency.  She denied having questions or needs.    16 minutes spent discussing advance care planning, this time excludes any other billed services.    Interval diagnostic studies and medical documentation entries pertinent to this case were reviewed independently by me. This patient has at least one acute or chronic illness or injury that poses a threat to life or bodily function. This patient suffers from a moderate risk of morbidity from additional invasive diagnostic testing or intensive treatment.     Nel \"Mago\" ELAINE Batista, [facility]  Inpatient Palliative Care (service hours Mon-Fri 8AM - 5PM)  [phone]      "

## 2025-06-18 LAB
ANION GAP SERPL CALC-SCNC: 10 MMOL/L (ref 7–16)
BACTERIA UR CULT: ABNORMAL
BACTERIA UR CULT: ABNORMAL
BUN SERPL-MCNC: 21 MG/DL (ref 8–22)
CALCIUM SERPL-MCNC: 8.5 MG/DL (ref 8.5–10.5)
CHLORIDE SERPL-SCNC: 104 MMOL/L (ref 96–112)
CO2 SERPL-SCNC: 23 MMOL/L (ref 20–33)
CREAT SERPL-MCNC: 0.53 MG/DL (ref 0.5–1.4)
ERYTHROCYTE [DISTWIDTH] IN BLOOD BY AUTOMATED COUNT: 42.3 FL (ref 35.9–50)
GFR SERPLBLD CREATININE-BSD FMLA CKD-EPI: 92 ML/MIN/1.73 M 2
GLUCOSE SERPL-MCNC: 110 MG/DL (ref 65–99)
HCT VFR BLD AUTO: 33.4 % (ref 37–47)
HGB BLD-MCNC: 10.9 G/DL (ref 12–16)
MAGNESIUM SERPL-MCNC: 1.9 MG/DL (ref 1.5–2.5)
MCH RBC QN AUTO: 29.8 PG (ref 27–33)
MCHC RBC AUTO-ENTMCNC: 32.6 G/DL (ref 32.2–35.5)
MCV RBC AUTO: 91.3 FL (ref 81.4–97.8)
PHOSPHATE SERPL-MCNC: 3.7 MG/DL (ref 2.5–4.5)
PLATELET # BLD AUTO: 231 K/UL (ref 164–446)
PMV BLD AUTO: 9.8 FL (ref 9–12.9)
POTASSIUM SERPL-SCNC: 4.5 MMOL/L (ref 3.6–5.5)
RBC # BLD AUTO: 3.66 M/UL (ref 4.2–5.4)
SIGNIFICANT IND 70042: ABNORMAL
SITE SITE: ABNORMAL
SODIUM SERPL-SCNC: 137 MMOL/L (ref 135–145)
SOURCE SOURCE: ABNORMAL
WBC # BLD AUTO: 6.3 K/UL (ref 4.8–10.8)

## 2025-06-18 PROCEDURE — 700111 HCHG RX REV CODE 636 W/ 250 OVERRIDE (IP)

## 2025-06-18 PROCEDURE — 51798 US URINE CAPACITY MEASURE: CPT

## 2025-06-18 PROCEDURE — 99233 SBSQ HOSP IP/OBS HIGH 50: CPT

## 2025-06-18 PROCEDURE — 700102 HCHG RX REV CODE 250 W/ 637 OVERRIDE(OP): Performed by: HOSPITALIST

## 2025-06-18 PROCEDURE — 700111 HCHG RX REV CODE 636 W/ 250 OVERRIDE (IP): Mod: JZ | Performed by: HOSPITALIST

## 2025-06-18 PROCEDURE — A9270 NON-COVERED ITEM OR SERVICE: HCPCS | Performed by: STUDENT IN AN ORGANIZED HEALTH CARE EDUCATION/TRAINING PROGRAM

## 2025-06-18 PROCEDURE — A9270 NON-COVERED ITEM OR SERVICE: HCPCS | Performed by: PHYSICAL MEDICINE & REHABILITATION

## 2025-06-18 PROCEDURE — 84100 ASSAY OF PHOSPHORUS: CPT

## 2025-06-18 PROCEDURE — A9270 NON-COVERED ITEM OR SERVICE: HCPCS | Performed by: HOSPITALIST

## 2025-06-18 PROCEDURE — 83735 ASSAY OF MAGNESIUM: CPT

## 2025-06-18 PROCEDURE — 87798 DETECT AGENT NOS DNA AMP: CPT

## 2025-06-18 PROCEDURE — 770006 HCHG ROOM/CARE - MED/SURG/GYN SEMI*

## 2025-06-18 PROCEDURE — 700102 HCHG RX REV CODE 250 W/ 637 OVERRIDE(OP): Performed by: PHYSICAL MEDICINE & REHABILITATION

## 2025-06-18 PROCEDURE — 85027 COMPLETE CBC AUTOMATED: CPT

## 2025-06-18 PROCEDURE — 80048 BASIC METABOLIC PNL TOTAL CA: CPT

## 2025-06-18 PROCEDURE — 700102 HCHG RX REV CODE 250 W/ 637 OVERRIDE(OP): Performed by: STUDENT IN AN ORGANIZED HEALTH CARE EDUCATION/TRAINING PROGRAM

## 2025-06-18 PROCEDURE — 36415 COLL VENOUS BLD VENIPUNCTURE: CPT

## 2025-06-18 PROCEDURE — 700105 HCHG RX REV CODE 258

## 2025-06-18 RX ADMIN — TAMSULOSIN HYDROCHLORIDE 0.4 MG: 0.4 CAPSULE ORAL at 07:49

## 2025-06-18 RX ADMIN — OXYCODONE HYDROCHLORIDE 10 MG: 10 TABLET ORAL at 13:35

## 2025-06-18 RX ADMIN — ENOXAPARIN SODIUM 40 MG: 100 INJECTION SUBCUTANEOUS at 17:05

## 2025-06-18 RX ADMIN — ACETAMINOPHEN 650 MG: 325 TABLET ORAL at 12:17

## 2025-06-18 RX ADMIN — OXYCODONE HYDROCHLORIDE 10 MG: 10 TABLET ORAL at 04:16

## 2025-06-18 RX ADMIN — OXYCODONE 5 MG: 5 TABLET ORAL at 20:43

## 2025-06-18 RX ADMIN — Medication 1000 UNITS: at 05:42

## 2025-06-18 RX ADMIN — ACETAMINOPHEN 650 MG: 325 TABLET ORAL at 05:42

## 2025-06-18 RX ADMIN — ACETAMINOPHEN 650 MG: 325 TABLET ORAL at 17:05

## 2025-06-18 RX ADMIN — Medication 5 MG: at 20:41

## 2025-06-18 RX ADMIN — Medication 500 MG: at 07:50

## 2025-06-18 RX ADMIN — DEXTROAMPHETAMINE SACCHARATE, AMPHETAMINE ASPARTATE, DEXTROAMPHETAMINE SULFATE AND AMPHETAMINE SULFATE 15 MG: 2.5; 2.5; 2.5; 2.5 TABLET ORAL at 05:42

## 2025-06-18 RX ADMIN — CEFTRIAXONE SODIUM 1000 MG: 10 INJECTION, POWDER, FOR SOLUTION INTRAVENOUS at 05:42

## 2025-06-18 RX ADMIN — Medication 100 MG: at 05:42

## 2025-06-18 ASSESSMENT — PAIN DESCRIPTION - PAIN TYPE
TYPE: ACUTE PAIN

## 2025-06-18 ASSESSMENT — ENCOUNTER SYMPTOMS
NAUSEA: 0
SHORTNESS OF BREATH: 1

## 2025-06-18 NOTE — CARE PLAN
The patient is Stable - Low risk of patient condition declining or worsening    Shift Goals  Clinical Goals: pts pain will be controlled to comfort level; pts skin integrity will be maintained; pt will remain free from falls  Patient Goals: sleep; help with TV  Family Goals: GUERITA    Progress made toward(s) clinical / shift goals:        Problem: Pain - Standard  Goal: Alleviation of pain or a reduction in pain to the patient’s comfort goal  Outcome: Progressing  Note: Pts pain controlled to comfort level following medication intervention. Pt resting comfortably with even & unlabored breathing.      Problem: Fall Risk  Goal: Patient will remain free from falls  Outcome: Progressing  Note: Patient remained free from falls throughout the shift. Bed locked in lowest position with bed alarm on. Treaded slipper socks in place. Call light within reach. Hourly rounding. Patient calls appropriately.        Patient is not progressing towards the following goals:

## 2025-06-18 NOTE — PROGRESS NOTES
Attempted to change IV site. Patient stated that she wanted to sleep at this time and would rather have it done tomorrow after Bedside RN provided education. Patient allowed RN to place new clean dressing over current IV site following education provided about her dressing lifting and being dirty. New dressing labeled appropriately. Patient agreeable for placement of new IV in morning due to current IV being positional in the AC. Charge Rn notified by bedside RN of education and intervention provided at this time.

## 2025-06-18 NOTE — PROGRESS NOTES
Report received from NOC RN and assumed patient care at 0700. Patient is A&Ox 4, on RA, and bed alarm is on . Patient declines pain at this time. Pt turned to left side using wedges. Pt had a incontinent BM checo pads changed and torres care performed. Adhesive dressing to right buttock changed picture uploaded to chart of wound. Pt denies any other symptoms or concerns at this time.  Call light within reach and bed in lowest position. Reinforced the need to call for assistance. Plan of care discussed and patient does not have any further needs at this time.

## 2025-06-18 NOTE — CARE PLAN
The patient is Stable - Low risk of patient condition declining or worsening    Shift Goals  Clinical Goals: pt will increase physical activity, pt will maintain tolerable pain level, maintain skin integrity  Patient Goals: help set up meals, comfort  Family Goals: GUERITA    Progress made toward(s) clinical / shift goals:    Problem: Pain - Standard  Goal: Alleviation of pain or a reduction in pain to the patient’s comfort goal  Outcome: Progressing     Problem: Knowledge Deficit - Standard  Goal: Patient and family/care givers will demonstrate understanding of plan of care, disease process/condition, diagnostic tests and medications  Outcome: Progressing     Problem: Fall Risk  Goal: Patient will remain free from falls  Outcome: Progressing     Problem: Skin Integrity  Goal: Skin integrity is maintained or improved  Outcome: Progressing       Patient is not progressing towards the following goals:

## 2025-06-18 NOTE — PROGRESS NOTES
Hospital Medicine Daily Progress Note    Date of Service  6/18/2025    Chief Complaint  Barbra العراقي is a 80 y.o. female admitted 6/12/2025 with right hip pain     Hospital Course    80 y.o. female prior orthopedic hip surgeries with a prior right proximal femur periprosthetic fracture with prior open reduction internal fixation on 8/29/2023 presented with right hip pain after she tripped and fallen. Subsequent CT hip noted with subtle acute fracture of anterior superior aspect of right greater trochanter.. CT knee with no evidence of acute fracture. Orthopedist Dr. Hidalgo evaluated and recommended nonoperative management with weight bearing as tolerated.  Patient noted to have reported dysuria and foul-smelling urine, urinary studies consistent with UTI and so patient started on IV antibiotics, cultures were collected.  Arellano was placed for urinary retention.  She failed a voiding trial 6/16/2025.  PT/OT recommended SNF.  Per PMR she is not a candidate for IRF.      Interval Problem Update    6/18  NAEO.  HDS.  Afebrile, on RA.  WBC 6.3, hemoglobin 10.9 stable, platelet 231 WNL.  Metabolic panel reviewed, overall unremarkable.  Urine cultures with Proteus with susceptibilities resulted.  Patient feels well overall today.  Denies any fevers or chills, denies any localized pain/tingling, pruritus or burning sensation in the right buttock area.  On exam, lesion at the right buttock similar to yesterday.    Switch to Augmentin based on susceptibilities on culture to complete antibiotic course.  Do voiding trial today    I have discussed this patient's plan of care and discharge plan at IDT rounds today with Case Management, Nursing, Nursing leadership, and other members of the IDT team.    Consultants/Specialty  orthopedics    Code Status  Full Code    Disposition    I have placed the appropriate orders for post-discharge needs.    Review of Systems  Review of Systems   Constitutional:  Positive for malaise/fatigue.    Respiratory:  Positive for shortness of breath.    Cardiovascular:  Positive for chest pain.   Gastrointestinal:  Negative for nausea.   Musculoskeletal:  Positive for joint pain.        Physical Exam  Temp:  [35.8 °C (96.5 °F)-36.8 °C (98.3 °F)] 36.2 °C (97.1 °F)  Pulse:  [65-80] 70  Resp:  [16-19] 16  BP: (114-133)/(45-72) 133/72  SpO2:  [92 %-97 %] 95 %    Physical Exam  Constitutional:       Appearance: She is ill-appearing.      Comments: Appeared frail on exam   Cardiovascular:      Rate and Rhythm: Normal rate and regular rhythm.      Pulses: Normal pulses.      Heart sounds: Normal heart sounds.   Pulmonary:      Effort: Pulmonary effort is normal. No respiratory distress.   Musculoskeletal:      Comments: Range of motion limited in right hip due to pain   Skin:     General: Skin is warm.   Neurological:      General: No focal deficit present.      Mental Status: She is alert.   Psychiatric:         Mood and Affect: Mood normal.         Fluids    Intake/Output Summary (Last 24 hours) at 6/18/2025 1617  Last data filed at 6/18/2025 1300  Gross per 24 hour   Intake 480 ml   Output 600 ml   Net -120 ml        Laboratory  Recent Labs     06/17/25  0400 06/18/25  0411   WBC 7.1 6.3   RBC 3.67* 3.66*   HEMOGLOBIN 10.9* 10.9*   HEMATOCRIT 34.2* 33.4*   MCV 93.2 91.3   MCH 29.7 29.8   MCHC 31.9* 32.6   RDW 43.1 42.3   PLATELETCT 200 231   MPV 10.1 9.8       Recent Labs     06/16/25  0137 06/17/25  0400 06/18/25  0411   SODIUM 131* 135 137   POTASSIUM 4.1 4.5 4.5   CHLORIDE 98 102 104   CO2 21 22 23   GLUCOSE 136* 115* 110*   BUN 16 19 21   CREATININE 0.66 0.67 0.53   CALCIUM 8.4* 8.6 8.5                     Imaging  US-RENAL   Final Result      No evidence of solid renal mass or hydronephrosis.      CT-KNEE W/O PLUS RECONS RIGHT   Final Result      1.  Suboptimal exam related to osteopenia.   2.  No evidence of acute fracture.   3.  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   4.  Mild subcutaneous edema.       CT-HIP W/O PLUS RECONS RIGHT   Final Result         1.  Possible subtle acute fracture of the anterior superior aspect of the right greater trochanter, limited evaluation due to metallic streak and scatter artifacts.   2.  Marked bladder distention, consider bladder outlet obstruction.   3.  Diverticulosis.         DX-FEMUR-2+ RIGHT   Final Result         1.  No radiographic evidence of acute traumatic injury.           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 Hidalgo evaluated and recommended nonoperative management with weight bearing as tolerated.  PT OT recommended SNF.   [facility]  Palliative care discussed with the patient regarding goal of care.      Acute UTI  Assessment & Plan  Foul and cloudy urine  Reports of dysuria.  Also now with urinary retention that is new.  UA consistent with UTI.  UCX preliminary showed Proteus  Continue IV ceftriaxone pending culture update    6/18  Urine cultures with Proteus and susceptibilities have resulted.  Switch to Augmentin based on susceptibilities to complete antibiotic course.  Do voiding trial today.      Bladder outlet obstruction  Assessment & Plan  Marked bladder distention on CT  Bladder scan noted with 1400 mL urine retention  Arellano catheter placed, added Flomax  Renal ultrasound with no hydronephrosis  Failed voiding trial on 6/16/2025, trialing again today.  Urology follow-up outpatient      Skin lesion  Assessment & Plan  Check VZV PCR -in process  Less likely shingles given absence of symptoms and overall stable appearance of the lesion.  Could be irritant contact dermatitis as patient has to be continuously wiped whenever she has a bowel movement.    Moderate protein-calorie malnutrition (HCC)  Assessment & Plan  Concern for malnutrition  Poor appetite, requested for protein supplement  evidenced by  muscle wasting  .   Dietitian consulted  Monitor weight, prevent wt loss   Monitor electrolytes to assess risk for refeeding syndrome,      Hyponatremia  Assessment & Plan  Likely dehydration, unable to rule out SIADH  Repeat labs  If low will start on IV fluid and continue monitor sodium level    6/17  Improved    ACP (advance care planning)  Assessment & Plan    Palliative care following for goal of care discussion  Patient would like to think about goal of care.    6/18  Remains full code after talk         VTE prophylaxis: lovenox    I have performed a physical exam and reviewed and updated ROS and Plan today (6/18/2025). In review of yesterday's note (6/17/2025), there are no changes except as documented above.    Greater than 51 minutes spent prepping to see patient (e.g. review of tests) obtaining and/or reviewing separately obtained history. Performing a medically appropriate examination and evaluation.  Counseling and educating the patient/family/caregiver.  Ordering medications, tests, or procedures.  Referring and communicating with other health care professionals.  Documenting clinical information in EPIC.  Independently interpreting results and communicating results to patient/family/caregiver.  Care coordination.

## 2025-06-18 NOTE — PROGRESS NOTES
0927 torres removed per MD order, emptied 150cc urine from torres bag. Education provided to pt regarding voiding trial. Pt set up with [employer] device.

## 2025-06-18 NOTE — PROGRESS NOTES
Stright cath at 1619 removed 700ml. Per Dr. Windy Ramirez bladder scan in 6 hours, if unable to void, straight cath one more time. If unable to void six hours after second straight cath, reinsert torres.

## 2025-06-19 VITALS
HEIGHT: 65 IN | SYSTOLIC BLOOD PRESSURE: 155 MMHG | RESPIRATION RATE: 17 BRPM | DIASTOLIC BLOOD PRESSURE: 68 MMHG | HEART RATE: 69 BPM | TEMPERATURE: 97 F | WEIGHT: 155.65 LBS | OXYGEN SATURATION: 96 % | BODY MASS INDEX: 25.93 KG/M2

## 2025-06-19 LAB
HSV1 DNA CSF QL NAA+PROBE: NOT DETECTED
HSV2 DNA CSF QL NAA+PROBE: NOT DETECTED
SPECIMEN SOURCE: NORMAL

## 2025-06-19 PROCEDURE — 700102 HCHG RX REV CODE 250 W/ 637 OVERRIDE(OP): Performed by: STUDENT IN AN ORGANIZED HEALTH CARE EDUCATION/TRAINING PROGRAM

## 2025-06-19 PROCEDURE — 99239 HOSP IP/OBS DSCHRG MGMT >30: CPT

## 2025-06-19 PROCEDURE — 51798 US URINE CAPACITY MEASURE: CPT

## 2025-06-19 PROCEDURE — 700102 HCHG RX REV CODE 250 W/ 637 OVERRIDE(OP): Performed by: HOSPITALIST

## 2025-06-19 PROCEDURE — 700102 HCHG RX REV CODE 250 W/ 637 OVERRIDE(OP): Performed by: PHYSICAL MEDICINE & REHABILITATION

## 2025-06-19 PROCEDURE — A9270 NON-COVERED ITEM OR SERVICE: HCPCS | Performed by: STUDENT IN AN ORGANIZED HEALTH CARE EDUCATION/TRAINING PROGRAM

## 2025-06-19 PROCEDURE — A9270 NON-COVERED ITEM OR SERVICE: HCPCS | Performed by: HOSPITALIST

## 2025-06-19 PROCEDURE — A9270 NON-COVERED ITEM OR SERVICE: HCPCS | Performed by: PHYSICAL MEDICINE & REHABILITATION

## 2025-06-19 PROCEDURE — 700102 HCHG RX REV CODE 250 W/ 637 OVERRIDE(OP)

## 2025-06-19 PROCEDURE — A9270 NON-COVERED ITEM OR SERVICE: HCPCS

## 2025-06-19 RX ORDER — DEXTROAMPHETAMINE SACCHARATE, AMPHETAMINE ASPARTATE, DEXTROAMPHETAMINE SULFATE AND AMPHETAMINE SULFATE 2.5; 2.5; 2.5; 2.5 MG/1; MG/1; MG/1; MG/1
15 TABLET ORAL ONCE
Refills: 0 | Status: COMPLETED | OUTPATIENT
Start: 2025-06-19 | End: 2025-06-19

## 2025-06-19 RX ORDER — DEXTROAMPHETAMINE SACCHARATE, AMPHETAMINE ASPARTATE, DEXTROAMPHETAMINE SULFATE AND AMPHETAMINE SULFATE 2.5; 2.5; 2.5; 2.5 MG/1; MG/1; MG/1; MG/1
30 TABLET ORAL DAILY
Refills: 0 | Status: DISCONTINUED | OUTPATIENT
Start: 2025-06-20 | End: 2025-06-19 | Stop reason: HOSPADM

## 2025-06-19 RX ORDER — TAMSULOSIN HYDROCHLORIDE 0.4 MG/1
0.4 CAPSULE ORAL
Status: SHIPPED
Start: 2025-06-20

## 2025-06-19 RX ORDER — LOPERAMIDE HYDROCHLORIDE 2 MG/1
2 CAPSULE ORAL 4 TIMES DAILY PRN
Status: SHIPPED
Start: 2025-06-19

## 2025-06-19 RX ORDER — LOPERAMIDE HYDROCHLORIDE 2 MG/1
2 CAPSULE ORAL ONCE
Status: COMPLETED | OUTPATIENT
Start: 2025-06-19 | End: 2025-06-19

## 2025-06-19 RX ORDER — OXYCODONE HYDROCHLORIDE 5 MG/1
5 TABLET ORAL EVERY 6 HOURS PRN
Qty: 20 TABLET | Refills: 0 | Status: SHIPPED | OUTPATIENT
Start: 2025-06-19 | End: 2025-06-19

## 2025-06-19 RX ORDER — POLYETHYLENE GLYCOL 3350 17 G/17G
17 POWDER, FOR SOLUTION ORAL 2 TIMES DAILY PRN
Status: SHIPPED
Start: 2025-06-19

## 2025-06-19 RX ORDER — DEXTROAMPHETAMINE SACCHARATE, AMPHETAMINE ASPARTATE, DEXTROAMPHETAMINE SULFATE AND AMPHETAMINE SULFATE 3.75; 3.75; 3.75; 3.75 MG/1; MG/1; MG/1; MG/1
30 TABLET ORAL EVERY MORNING
Qty: 60 TABLET | Refills: 0 | Status: SHIPPED | OUTPATIENT
Start: 2025-06-19 | End: 2025-07-19

## 2025-06-19 RX ORDER — POLYETHYLENE GLYCOL 3350 17 G/17G
17 POWDER, FOR SOLUTION ORAL 2 TIMES DAILY
Status: SHIPPED
Start: 2025-06-19 | End: 2025-06-19

## 2025-06-19 RX ORDER — LOPERAMIDE HYDROCHLORIDE 2 MG/1
4 CAPSULE ORAL 4 TIMES DAILY PRN
Status: DISCONTINUED | OUTPATIENT
Start: 2025-06-19 | End: 2025-06-19 | Stop reason: HOSPADM

## 2025-06-19 RX ORDER — OXYCODONE HYDROCHLORIDE 5 MG/1
5 TABLET ORAL EVERY 6 HOURS PRN
Qty: 20 TABLET | Refills: 0 | Status: SHIPPED | OUTPATIENT
Start: 2025-06-19 | End: 2025-06-24

## 2025-06-19 RX ORDER — LOPERAMIDE HYDROCHLORIDE 2 MG/1
2 CAPSULE ORAL 4 TIMES DAILY PRN
Status: DISCONTINUED | OUTPATIENT
Start: 2025-06-19 | End: 2025-06-19

## 2025-06-19 RX ADMIN — DEXTROAMPHETAMINE SACCHARATE, AMPHETAMINE ASPARTATE, DEXTROAMPHETAMINE SULFATE AND AMPHETAMINE SULFATE 15 MG: 2.5; 2.5; 2.5; 2.5 TABLET ORAL at 12:26

## 2025-06-19 RX ADMIN — OXYCODONE 5 MG: 5 TABLET ORAL at 08:15

## 2025-06-19 RX ADMIN — TAMSULOSIN HYDROCHLORIDE 0.4 MG: 0.4 CAPSULE ORAL at 08:16

## 2025-06-19 RX ADMIN — ACETAMINOPHEN 650 MG: 325 TABLET ORAL at 00:10

## 2025-06-19 RX ADMIN — LOPERAMIDE HYDROCHLORIDE 2 MG: 2 CAPSULE ORAL at 13:17

## 2025-06-19 RX ADMIN — Medication 500 MG: at 08:15

## 2025-06-19 RX ADMIN — ACETAMINOPHEN 650 MG: 325 TABLET ORAL at 05:18

## 2025-06-19 RX ADMIN — Medication 1000 UNITS: at 05:17

## 2025-06-19 RX ADMIN — LOPERAMIDE HYDROCHLORIDE 2 MG: 2 CAPSULE ORAL at 12:25

## 2025-06-19 RX ADMIN — Medication 100 MG: at 05:18

## 2025-06-19 RX ADMIN — ACETAMINOPHEN 650 MG: 325 TABLET ORAL at 12:25

## 2025-06-19 RX ADMIN — DEXTROAMPHETAMINE SACCHARATE, AMPHETAMINE ASPARTATE, DEXTROAMPHETAMINE SULFATE AND AMPHETAMINE SULFATE 15 MG: 2.5; 2.5; 2.5; 2.5 TABLET ORAL at 05:18

## 2025-06-19 RX ADMIN — AMOXICILLIN AND CLAVULANATE POTASSIUM 1 TABLET: 875; 125 TABLET, FILM COATED ORAL at 05:17

## 2025-06-19 ASSESSMENT — PAIN DESCRIPTION - PAIN TYPE: TYPE: ACUTE PAIN

## 2025-06-19 ASSESSMENT — PATIENT HEALTH QUESTIONNAIRE - PHQ9
SUM OF ALL RESPONSES TO PHQ9 QUESTIONS 1 AND 2: 0
2. FEELING DOWN, DEPRESSED, IRRITABLE, OR HOPELESS: NOT AT ALL
1. LITTLE INTEREST OR PLEASURE IN DOING THINGS: NOT AT ALL

## 2025-06-19 NOTE — PROGRESS NOTES
1219 called to give report to RN at [facility], flex [facility] will notify nurse and have her return my call. Phone number provided.  1318 called to give report, per Todd she is currently walking ppwk to Rn and will have her return my call in a few minutes.   1340 Called to give report, flex [facility] will try to page RN.

## 2025-06-19 NOTE — DISCHARGE SUMMARY
Discharge Summary    CHIEF COMPLAINT ON ADMISSION  Chief Complaint   Patient presents with    Fall     Tripped and fell at home    Leg Pain     Right upper leg pain, swelling noted, non ambulatory, -rotation/shortening       Reason for Admission  ems     Admission Date  6/12/2025    CODE STATUS  Full Code    HPI & HOSPITAL COURSE    80 y.o. female prior orthopedic hip surgeries with a prior right proximal femur periprosthetic fracture with prior open reduction internal fixation on 8/29/2023 presented with right hip pain after she tripped and fallen. Subsequent CT hip noted with subtle acute fracture of anterior superior aspect of right greater trochanter.. CT knee with no evidence of acute fracture. Orthopedist Dr. Hidalgo evaluated and recommended nonoperative management with weight bearing as tolerated.  Patient was noted to have reported dysuria and foul-smelling urine, urinary studies consistent with UTI and so patient started on IV ceftriaxone, cultures were collected.  Arellano was placed for urinary retention.  She failed a voiding trial 6/16/2025.  PT/OT recommended SNF.  Per PMR she is not a candidate for IRF.    While admitted, patient remained hemodynamically stable and clinically well.  Urinary symptoms improved.  Pain controlled on regimen.  She did have a small lesion in her right buttock with vesicular appearance concerning for shingles initially.  VZV PCR was collected and still in process however on further investigation shingles is less likely as she did not have any symptoms associated with this such as burning/tingling/pain/pruritus and the lesion remained stable.  Could be irritant dermatitis as she was being wiped whenever she had a bowel movement.  Regarding her UTI, urine cultures eventually grew Proteus mirabilis.  Patient transition to Augmentin based on susceptibilities.  She did a trial of void which she failed and so her Arellano was reinserted.  Urology referral outpatient  placed.    Therefore, she is discharged in fair and stable condition to skilled nursing facility.    The patient met 2-midnight criteria for an inpatient stay at the time of discharge.    Discharge Date  6/19    FOLLOW UP ITEMS POST DISCHARGE  Follow up with urology   Follow up with PCP    DISCHARGE DIAGNOSES  Principal Problem:    Periprosthetic fracture around internal prosthetic hip joint, initial encounter (POA: Yes)  Active Problems:    Bladder outlet obstruction (POA: Unknown)    Acute UTI (POA: Unknown)    ACP (advance care planning) (POA: Unknown)    Hyponatremia (POA: Unknown)    Moderate protein-calorie malnutrition (HCC) (POA: Unknown)    Skin lesion (POA: Unknown)  Resolved Problems:    * No resolved hospital problems. *      FOLLOW UP  No future appointments.  [facility]  [address]  [place] [address]  [phone]          MEDICATIONS ON DISCHARGE     Medication List        START taking these medications        Instructions   amoxicillin-clavulanate 875-125 MG Tabs  Commonly known as: Augmentin   Take 1 Tablet by mouth every 12 hours for 3 days.  Dose: 1 Tablet     loperamide 2 MG Caps  Commonly known as: Imodium   Take 1 Capsule by mouth 4 times a day as needed for Diarrhea.  Dose: 2 mg     oxyCODONE immediate-release 5 MG Tabs  Commonly known as: Roxicodone   Take 1 Tablet by mouth every 6 hours as needed for Severe Pain for up to 5 days.  Dose: 5 mg     polyethylene glycol/lytes 17 g Pack  Commonly known as: Miralax   Take 1 Packet by mouth 2 times a day as needed (constipation).  Dose: 17 g     tamsulosin 0.4 MG capsule  Start taking on: June 20, 2025  Commonly known as: Flomax   Take 1 Capsule by mouth 1/2 hour after breakfast.  Dose: 0.4 mg            CONTINUE taking these medications        Instructions   acetaminophen 500 MG Tabs  Commonly known as: Tylenol   Take 2 Tablets by mouth every 6 hours.  Dose: 1,000 mg     amphetamine-dextroamphetamine 15 MG  tablet  Commonly known as: Adderall   Take 30 mg by mouth every morning.  Dose: 30 mg     B-12 PO   Take 1 Tablet by mouth every day.  Dose: 1 Tablet     CALCIUM 600 PO   Take 1 Tablet by mouth every day.  Dose: 1 Tablet     D3 PO   Take 1 Tablet by mouth every day.  Dose: 1 Tablet     melatonin 5 MG Tabs   Take 1 Tablet by mouth every evening.  Dose: 5 mg     Zinc 50 MG Tabs   Take 50 mg by mouth every day.  Dose: 50 mg     ZINC PO   Take 1 Tablet by mouth every day.  Dose: 1 Tablet              Allergies  Allergies[1]    DIET  Orders Placed This Encounter   Procedures    Diet Order Diet: Regular     Standing Status:   Standing     Number of Occurrences:   1     Diet::   Regular [1]       ACTIVITY  As tolerated.  Weight bearing as tolerated    CONSULTATIONS  Orthopedic surgery    PROCEDURES  -    LABORATORY  Lab Results   Component Value Date    SODIUM 137 06/18/2025    POTASSIUM 4.5 06/18/2025    CHLORIDE 104 06/18/2025    CO2 23 06/18/2025    GLUCOSE 110 (H) 06/18/2025    BUN 21 06/18/2025    CREATININE 0.53 06/18/2025        Lab Results   Component Value Date    WBC 6.3 06/18/2025    HEMOGLOBIN 10.9 (L) 06/18/2025    HEMATOCRIT 33.4 (L) 06/18/2025    PLATELETCT 231 06/18/2025        Total time of the discharge process exceeds 35 minutes.         [1]   Allergies  Allergen Reactions    Ampicillin Unspecified     Hazy, bloated    Keflex [Cephalexin] Unspecified     Drowsy, altered

## 2025-06-19 NOTE — PROGRESS NOTES
Report received from NOC RN and assumed patient care at 0700. Patient is A&Ox 4, on RA, and bed alarm is on . Patient reporting a pain level of 6/10, medicated per MAR. Pt has not voided since she was straight cathed this AM, pt states it does not feel like she needs to go at this time. Upon assessment pt bladder does not feel distended.  Pt denies any other symptoms or needs at this time. Pt refused to sit in chair for breakfast she states she would like to wait until lunch, education provided. Call light within reach and bed in lowest position. Reinforced the need to call for assistance. Plan of care discussed and patient does not have any further needs at this time.

## 2025-06-19 NOTE — PROGRESS NOTES
Assumed patient care and received bedside report from Day RN. Patient is A&Ox4 and reports 3/10 pain of the back, declines medication at this time. Non-pharmacological interventions encouraged like ice/heat pack and repositioning. Patient on RA, awake, and alert.     Patient educated on the use of the call light and verbalized understanding. Bed in the lowest and locked position with alarm on. Personal belongings and call light within reach. High Fall Risk precautions and hourly rounding in place. Safety education provided. POC discussed and patient declines any further needs at this time. Orders carried out.

## 2025-06-19 NOTE — DISCHARGE PLANNING
Case Management Discharge Planning    Admission Date: 6/12/2025  GMLOS: 2.8  ALOS: 7      Anticipated Discharge Dispo: Discharge Disposition: D/T to SNF with Medicare cert in anticipation of skilled care (03)  Discharge Address: [address] [place] [address] [address] [place] [address]    Pt's case discussed during IDT rounds. Pt is medically cleared for SNF    Spoke with [facility]. They're able to accept today and have arranged transport for 1400 with their van.     Care team notified     COBRA packet completed and left with RN    1335- Received call from lupe w/ [facility] asking for oxycodone and adderall to be e prescribed to their pharmacy [facility] fax # [phone]. MD notified.

## 2025-06-19 NOTE — CARE PLAN
The patient is Stable - Low risk of patient condition declining or worsening    Shift Goals  Clinical Goals: pt will be free from falls this shift, maintain skin integrity, pain will be at  Patient Goals: help set up meals, comfort  Family Goals: GUERITA    Progress made toward(s) clinical / shift goals:        Problem: Pain - Standard  Goal: Alleviation of pain or a reduction in pain to the patient’s comfort goal    Outcome: Progressing  Note: Pt's pain is being managed with scheduled tylenol and prn Oxy given at bedtime. No complaints of pain at this time     Problem: Fall Risk  Goal: Patient will remain free from falls  Outcome: Progressing  Note: Pt rounded on hourly. Bed locked to its lowest position with alarm on. Call light and personal belongings within reach. Pt remained free from falls at this time.     Problem: Skin Integrity  Goal: Skin integrity is maintained or improved  Outcome: Progressing  Note: Q2 turn with wedges and low air loss in place. Yaritza care with every incontinent episode. No new skin issues at this time.       Patient is not progressing towards the following goals:

## 2025-06-20 LAB
SPECIMEN SOURCE: NORMAL
VZV DNA SPEC QL NAA+PROBE: NOT DETECTED

## (undated) DEVICE — STAPLER 35MM SKIN WIDE ROTATING HEAD (6EA/BX)

## (undated) DEVICE — Device

## (undated) DEVICE — CANISTER SUCTION 3000ML MECHANICAL FILTER AUTO SHUTOFF MEDI-VAC NONSTERILE LF DISP (40EA/CA)

## (undated) DEVICE — DRAPE U SPLIT IMP 54 X 76 - (24/CA)

## (undated) DEVICE — SLEEVE, VASO, THIGH, MED

## (undated) DEVICE — DRESSING PETROLEUM GAUZE 5 X 9" (50EA/BX 4BX/CA)"

## (undated) DEVICE — ELECTRODE DUAL RETURN W/ CORD - (50/PK)

## (undated) DEVICE — SYSTEM PREVENA DRESSING CUSTOMIZABLE (5EA/CA)

## (undated) DEVICE — DRAPE U ORTHOPEDIC - (10/BX)

## (undated) DEVICE — SODIUM CHL IRRIGATION 0.9% 1000ML (12EA/CA)

## (undated) DEVICE — PACK MAJOR ORTHO - (2EA/CA)

## (undated) DEVICE — GLOVE BIOGEL SZ 7.5 SURGICAL PF LTX - (50PR/BX 4BX/CA)

## (undated) DEVICE — IMMOBILIZER KNEE 20 INCH - (1/EA)

## (undated) DEVICE — SUTURE 0 STRATRFIX SYMMETRIC PDS PLUS 30CM CT-1 (12EA/BX)

## (undated) DEVICE — WRAP COBAN SELF-ADHERENT 6 IN X 5YDS STERILE TAN (12/CA)

## (undated) DEVICE — SUCTION INSTRUMENT YANKAUER OPEN TIP W/O VENT (50EA/CA)

## (undated) DEVICE — LACTATED RINGERS INJ 1000 ML - (14EA/CA 60CA/PF)

## (undated) DEVICE — GLOVE BIOGEL INDICATOR SZ 8 SURGICAL PF LTX - (50/BX 4BX/CA)

## (undated) DEVICE — PADDING CAST 6 IN STERILE - 6 X 4 YDS (24/CA)

## (undated) DEVICE — DRAPE SURG STERI-DRAPE 7X11OD - (40EA/CA)

## (undated) DEVICE — CHLORAPREP 26 ML APPLICATOR - ORANGE TINT(25/CA)

## (undated) DEVICE — BANDAGE ELASTIC STERILE MATRIX 6 X 10 (20EA/CA)

## (undated) DEVICE — SUTURE 2-0 VICRYL PLUS CT-1 - 8 X 18 INCH(12/BX)

## (undated) DEVICE — SET LEADWIRE 5 LEAD BEDSIDE DISPOSABLE ECG (1SET OF 5/EA)

## (undated) DEVICE — PENCIL ELECTSURG 10FT BTN SWH - (50/CA)

## (undated) DEVICE — SUCTION INSTRUMENT YANKAUER BULBOUS TIP W/O VENT (50EA/CA)

## (undated) DEVICE — DRAPE LARGE 3 QUARTER - (20/CA)

## (undated) DEVICE — TOWEL STOP TIMEOUT SAFETY FLAG (40EA/CA)

## (undated) DEVICE — COVER LIGHT HANDLE ALC PLUS DISP (18EA/BX)

## (undated) DEVICE — BOVIE BLADE COATED &INSULATED - 25/PK

## (undated) DEVICE — DRAPE 36X28IN RAD CARM BND BG - (25/CA) O

## (undated) DEVICE — STOCKINET TUBULAR 6IN STERILE - 6 X 48YDS (25/CA)

## (undated) DEVICE — GOWN WARMING STANDARD FLEX - (30/CA)

## (undated) DEVICE — SET EXTENSION WITH 2 PORTS (48EA/CA) ***PART #2C8610 IS A SUBSTITUTE*****

## (undated) DEVICE — SENSOR OXIMETER ADULT SPO2 RD SET (20EA/BX)

## (undated) DEVICE — SUTURE 1 VICRYL PLUS CT-1 - 18 INCH (12/BX)

## (undated) DEVICE — SUTURE GENERAL

## (undated) DEVICE — TUBING CLEARLINK DUO-VENT - C-FLO (48EA/CA)